# Patient Record
Sex: MALE | Race: WHITE | NOT HISPANIC OR LATINO | URBAN - METROPOLITAN AREA
[De-identification: names, ages, dates, MRNs, and addresses within clinical notes are randomized per-mention and may not be internally consistent; named-entity substitution may affect disease eponyms.]

---

## 2018-08-21 ENCOUNTER — ON CAMPUS - OUTPATIENT (AMBULATORY)
Dept: URBAN - METROPOLITAN AREA HOSPITAL 85 | Facility: HOSPITAL | Age: 58
End: 2018-08-21

## 2018-08-21 DIAGNOSIS — K52.9 NONINFECTIVE GASTROENTERITIS AND COLITIS, UNSPECIFIED: ICD-10-CM

## 2018-08-21 DIAGNOSIS — R73.9 HYPERGLYCEMIA, UNSPECIFIED: ICD-10-CM

## 2018-08-21 DIAGNOSIS — R10.9 UNSPECIFIED ABDOMINAL PAIN: ICD-10-CM

## 2018-08-21 PROCEDURE — 99244 OFF/OP CNSLTJ NEW/EST MOD 40: CPT | Performed by: NURSE PRACTITIONER

## 2018-08-22 ENCOUNTER — ON CAMPUS - OUTPATIENT (AMBULATORY)
Dept: URBAN - METROPOLITAN AREA HOSPITAL 85 | Facility: HOSPITAL | Age: 58
End: 2018-08-22

## 2018-08-22 DIAGNOSIS — K52.9 NONINFECTIVE GASTROENTERITIS AND COLITIS, UNSPECIFIED: ICD-10-CM

## 2018-08-22 DIAGNOSIS — R73.9 HYPERGLYCEMIA, UNSPECIFIED: ICD-10-CM

## 2018-08-22 DIAGNOSIS — R10.9 UNSPECIFIED ABDOMINAL PAIN: ICD-10-CM

## 2018-08-22 PROCEDURE — 99213 OFFICE O/P EST LOW 20 MIN: CPT | Performed by: NURSE PRACTITIONER

## 2018-09-14 ENCOUNTER — OFFICE (AMBULATORY)
Dept: URBAN - METROPOLITAN AREA CLINIC 64 | Facility: CLINIC | Age: 58
End: 2018-09-14

## 2018-09-14 VITALS
SYSTOLIC BLOOD PRESSURE: 124 MMHG | WEIGHT: 226 LBS | HEIGHT: 71 IN | HEART RATE: 67 BPM | DIASTOLIC BLOOD PRESSURE: 75 MMHG

## 2018-09-14 DIAGNOSIS — K50.00 CROHN'S DISEASE OF SMALL INTESTINE WITHOUT COMPLICATIONS: ICD-10-CM

## 2018-09-14 PROCEDURE — 99213 OFFICE O/P EST LOW 20 MIN: CPT | Performed by: NURSE PRACTITIONER

## 2018-10-26 ENCOUNTER — ON CAMPUS - OUTPATIENT (AMBULATORY)
Dept: URBAN - METROPOLITAN AREA HOSPITAL 2 | Facility: HOSPITAL | Age: 58
End: 2018-10-26
Payer: COMMERCIAL

## 2018-10-26 ENCOUNTER — OFFICE (AMBULATORY)
Dept: URBAN - METROPOLITAN AREA PATHOLOGY 4 | Facility: PATHOLOGY | Age: 58
End: 2018-10-26

## 2018-10-26 VITALS
WEIGHT: 228 LBS | SYSTOLIC BLOOD PRESSURE: 106 MMHG | DIASTOLIC BLOOD PRESSURE: 79 MMHG | HEART RATE: 64 BPM | RESPIRATION RATE: 15 BRPM | TEMPERATURE: 97.4 F | OXYGEN SATURATION: 98 % | OXYGEN SATURATION: 96 % | DIASTOLIC BLOOD PRESSURE: 77 MMHG | SYSTOLIC BLOOD PRESSURE: 119 MMHG | HEART RATE: 72 BPM | DIASTOLIC BLOOD PRESSURE: 73 MMHG | HEART RATE: 66 BPM | DIASTOLIC BLOOD PRESSURE: 74 MMHG | HEART RATE: 76 BPM | HEIGHT: 71 IN | DIASTOLIC BLOOD PRESSURE: 81 MMHG | SYSTOLIC BLOOD PRESSURE: 132 MMHG | SYSTOLIC BLOOD PRESSURE: 134 MMHG | SYSTOLIC BLOOD PRESSURE: 130 MMHG | OXYGEN SATURATION: 97 % | DIASTOLIC BLOOD PRESSURE: 83 MMHG | DIASTOLIC BLOOD PRESSURE: 85 MMHG | SYSTOLIC BLOOD PRESSURE: 120 MMHG | SYSTOLIC BLOOD PRESSURE: 118 MMHG | SYSTOLIC BLOOD PRESSURE: 152 MMHG | HEART RATE: 80 BPM | HEART RATE: 67 BPM | HEART RATE: 68 BPM | RESPIRATION RATE: 18 BRPM | OXYGEN SATURATION: 100 % | RESPIRATION RATE: 16 BRPM | SYSTOLIC BLOOD PRESSURE: 112 MMHG | DIASTOLIC BLOOD PRESSURE: 80 MMHG | DIASTOLIC BLOOD PRESSURE: 88 MMHG | DIASTOLIC BLOOD PRESSURE: 67 MMHG

## 2018-10-26 DIAGNOSIS — K64.0 FIRST DEGREE HEMORRHOIDS: ICD-10-CM

## 2018-10-26 DIAGNOSIS — K50.00 CROHN'S DISEASE OF SMALL INTESTINE WITHOUT COMPLICATIONS: ICD-10-CM

## 2018-10-26 DIAGNOSIS — D12.2 BENIGN NEOPLASM OF ASCENDING COLON: ICD-10-CM

## 2018-10-26 DIAGNOSIS — D12.4 BENIGN NEOPLASM OF DESCENDING COLON: ICD-10-CM

## 2018-10-26 DIAGNOSIS — D12.5 BENIGN NEOPLASM OF SIGMOID COLON: ICD-10-CM

## 2018-10-26 DIAGNOSIS — R10.31 RIGHT LOWER QUADRANT PAIN: ICD-10-CM

## 2018-10-26 DIAGNOSIS — K57.30 DIVERTICULOSIS OF LARGE INTESTINE WITHOUT PERFORATION OR ABS: ICD-10-CM

## 2018-10-26 LAB
GI HISTOLOGY: A. UNSPECIFIED: (no result)
GI HISTOLOGY: B. UNSPECIFIED: (no result)
GI HISTOLOGY: C. SELECT: (no result)
GI HISTOLOGY: D. UNSPECIFIED: (no result)
GI HISTOLOGY: E. UNSPECIFIED: (no result)
GI HISTOLOGY: PDF REPORT: (no result)

## 2018-10-26 PROCEDURE — 45380 COLONOSCOPY AND BIOPSY: CPT | Mod: 59 | Performed by: INTERNAL MEDICINE

## 2018-10-26 PROCEDURE — 45385 COLONOSCOPY W/LESION REMOVAL: CPT | Performed by: INTERNAL MEDICINE

## 2018-10-26 PROCEDURE — 88305 TISSUE EXAM BY PATHOLOGIST: CPT | Performed by: INTERNAL MEDICINE

## 2019-09-17 ENCOUNTER — HOSPITAL ENCOUNTER (EMERGENCY)
Facility: HOSPITAL | Age: 59
Discharge: HOME | End: 2019-09-17
Admitting: EMERGENCY MEDICINE

## 2019-09-17 ENCOUNTER — HOSPITAL ENCOUNTER (EMERGENCY)
Dept: CARDIOLOGY | Facility: HOSPITAL | Age: 59
Discharge: HOME | End: 2019-09-17

## 2019-09-17 VITALS
HEIGHT: 71 IN | SYSTOLIC BLOOD PRESSURE: 147 MMHG | HEART RATE: 75 BPM | TEMPERATURE: 99 F | DIASTOLIC BLOOD PRESSURE: 80 MMHG | BODY MASS INDEX: 32.76 KG/M2 | OXYGEN SATURATION: 95 % | WEIGHT: 234 LBS | RESPIRATION RATE: 12 BRPM

## 2019-09-17 DIAGNOSIS — M79.661 RIGHT CALF PAIN: ICD-10-CM

## 2019-09-17 DIAGNOSIS — I82.4Z1 LOWER LEG DVT (DEEP VENOUS THROMBOEMBOLISM), ACUTE, RIGHT (HCC): Primary | ICD-10-CM

## 2019-09-17 LAB
ANION GAP SERPL CALCULATED.3IONS-SCNC: 13.9 MMOL/L (ref 5–15)
APTT PPP: 25.2 SECONDS (ref 24–31)
BASOPHILS # BLD AUTO: 0 10*3/MM3 (ref 0–0.2)
BASOPHILS NFR BLD AUTO: 0.4 % (ref 0–1.5)
BH CV LOW VAS RIGHT GASTRONEMIUS VESSEL: 1
BH CV LOWER VASCULAR LEFT COMMON FEMORAL AUGMENT: NORMAL
BH CV LOWER VASCULAR LEFT COMMON FEMORAL COMPETENT: NORMAL
BH CV LOWER VASCULAR LEFT COMMON FEMORAL COMPRESS: NORMAL
BH CV LOWER VASCULAR LEFT COMMON FEMORAL PHASIC: NORMAL
BH CV LOWER VASCULAR LEFT COMMON FEMORAL SPONT: NORMAL
BH CV LOWER VASCULAR RIGHT COMMON FEMORAL AUGMENT: NORMAL
BH CV LOWER VASCULAR RIGHT COMMON FEMORAL COMPETENT: NORMAL
BH CV LOWER VASCULAR RIGHT COMMON FEMORAL COMPRESS: NORMAL
BH CV LOWER VASCULAR RIGHT COMMON FEMORAL PHASIC: NORMAL
BH CV LOWER VASCULAR RIGHT COMMON FEMORAL SPONT: NORMAL
BH CV LOWER VASCULAR RIGHT DISTAL FEMORAL COMPRESS: NORMAL
BH CV LOWER VASCULAR RIGHT GASTRONEMIUS COMPRESS: NORMAL
BH CV LOWER VASCULAR RIGHT GASTRONEMIUS THROMBUS: NORMAL
BH CV LOWER VASCULAR RIGHT GREATER SAPH AK COMPRESS: NORMAL
BH CV LOWER VASCULAR RIGHT GREATER SAPH BK COMPRESS: NORMAL
BH CV LOWER VASCULAR RIGHT LESSER SAPH COMPRESS: NORMAL
BH CV LOWER VASCULAR RIGHT MID FEMORAL AUGMENT: NORMAL
BH CV LOWER VASCULAR RIGHT MID FEMORAL COMPETENT: NORMAL
BH CV LOWER VASCULAR RIGHT MID FEMORAL COMPRESS: NORMAL
BH CV LOWER VASCULAR RIGHT MID FEMORAL PHASIC: NORMAL
BH CV LOWER VASCULAR RIGHT MID FEMORAL SPONT: NORMAL
BH CV LOWER VASCULAR RIGHT PERONEAL COMPRESS: NORMAL
BH CV LOWER VASCULAR RIGHT POPLITEAL AUGMENT: NORMAL
BH CV LOWER VASCULAR RIGHT POPLITEAL COMPETENT: NORMAL
BH CV LOWER VASCULAR RIGHT POPLITEAL COMPRESS: NORMAL
BH CV LOWER VASCULAR RIGHT POPLITEAL PHASIC: NORMAL
BH CV LOWER VASCULAR RIGHT POPLITEAL SPONT: NORMAL
BH CV LOWER VASCULAR RIGHT POSTERIOR TIBIAL COMPRESS: NORMAL
BH CV LOWER VASCULAR RIGHT PROXIMAL FEMORAL COMPRESS: NORMAL
BH CV LOWER VASCULAR RIGHT SAPHENOFEMORAL JUNCTION AUGMENT: NORMAL
BH CV LOWER VASCULAR RIGHT SAPHENOFEMORAL JUNCTION COMPETENT: NORMAL
BH CV LOWER VASCULAR RIGHT SAPHENOFEMORAL JUNCTION COMPRESS: NORMAL
BH CV LOWER VASCULAR RIGHT SAPHENOFEMORAL JUNCTION PHASIC: NORMAL
BH CV LOWER VASCULAR RIGHT SAPHENOFEMORAL JUNCTION SPONT: NORMAL
BH CV LOWER VASCULAR RIGHT VARICOSITY BK COMPRESS: NORMAL
BUN BLD-MCNC: 13 MG/DL (ref 8–20)
BUN/CREAT SERPL: 13 (ref 6.2–20.3)
CALCIUM SPEC-SCNC: 8.9 MG/DL (ref 8.9–10.3)
CHLORIDE SERPL-SCNC: 101 MMOL/L (ref 101–111)
CO2 SERPL-SCNC: 30 MMOL/L (ref 22–32)
CREAT BLD-MCNC: 1 MG/DL (ref 0.7–1.2)
DEPRECATED RDW RBC AUTO: 45.1 FL (ref 37–54)
EOSINOPHIL # BLD AUTO: 0.4 10*3/MM3 (ref 0–0.4)
EOSINOPHIL NFR BLD AUTO: 6.1 % (ref 0.3–6.2)
ERYTHROCYTE [DISTWIDTH] IN BLOOD BY AUTOMATED COUNT: 14.6 % (ref 12.3–15.4)
GFR SERPL CREATININE-BSD FRML MDRD: 76 ML/MIN/1.73
GLUCOSE BLD-MCNC: 96 MG/DL (ref 65–99)
HCT VFR BLD AUTO: 38.7 % (ref 37.5–51)
HGB BLD-MCNC: 12.9 G/DL (ref 13–17.7)
INR PPP: 0.95 (ref 0.9–1.1)
LYMPHOCYTES # BLD AUTO: 1.1 10*3/MM3 (ref 0.7–3.1)
LYMPHOCYTES NFR BLD AUTO: 15.2 % (ref 19.6–45.3)
MCH RBC QN AUTO: 29.4 PG (ref 26.6–33)
MCHC RBC AUTO-ENTMCNC: 33.2 G/DL (ref 31.5–35.7)
MCV RBC AUTO: 88.6 FL (ref 79–97)
MONOCYTES # BLD AUTO: 0.5 10*3/MM3 (ref 0.1–0.9)
MONOCYTES NFR BLD AUTO: 7.6 % (ref 5–12)
NEUTROPHILS # BLD AUTO: 5 10*3/MM3 (ref 1.7–7)
NEUTROPHILS NFR BLD AUTO: 70.7 % (ref 42.7–76)
NRBC BLD AUTO-RTO: 0.1 /100 WBC (ref 0–0.2)
PLATELET # BLD AUTO: 170 10*3/MM3 (ref 140–450)
PMV BLD AUTO: 8.2 FL (ref 6–12)
POTASSIUM BLD-SCNC: 3.9 MMOL/L (ref 3.6–5.1)
PROTHROMBIN TIME: 9.9 SECONDS (ref 9.6–11.7)
RBC # BLD AUTO: 4.37 10*6/MM3 (ref 4.14–5.8)
SODIUM BLD-SCNC: 141 MMOL/L (ref 136–144)
WBC NRBC COR # BLD: 7 10*3/MM3 (ref 3.4–10.8)

## 2019-09-17 PROCEDURE — 85610 PROTHROMBIN TIME: CPT | Performed by: NURSE PRACTITIONER

## 2019-09-17 PROCEDURE — 85025 COMPLETE CBC W/AUTO DIFF WBC: CPT | Performed by: NURSE PRACTITIONER

## 2019-09-17 PROCEDURE — 85730 THROMBOPLASTIN TIME PARTIAL: CPT | Performed by: NURSE PRACTITIONER

## 2019-09-17 PROCEDURE — 93971 EXTREMITY STUDY: CPT

## 2019-09-17 PROCEDURE — 80048 BASIC METABOLIC PNL TOTAL CA: CPT | Performed by: NURSE PRACTITIONER

## 2019-09-17 PROCEDURE — 99283 EMERGENCY DEPT VISIT LOW MDM: CPT

## 2019-09-17 RX ORDER — FLUTICASONE PROPIONATE 50 MCG
1 SPRAY, SUSPENSION (ML) NASAL AS NEEDED
COMMUNITY
Start: 2011-12-09

## 2019-09-17 RX ORDER — ALLOPURINOL 300 MG/1
300 TABLET ORAL DAILY
COMMUNITY
Start: 2011-12-09

## 2019-09-17 RX ORDER — LISINOPRIL 40 MG/1
40 TABLET ORAL DAILY
COMMUNITY
Start: 2011-12-09

## 2019-09-17 NOTE — DISCHARGE INSTRUCTIONS
Take medication as prescribed.  Rest your leg.  Follow-up with your primary care physician.  Return for new or worsening symptoms.

## 2019-09-17 NOTE — ED PROVIDER NOTES
Subjective   Patient is a 59-year-old white male with history of prior DVT in the right leg years ago presents today with complaints of pain in his right calf.  States pain started yesterday immediately after he stepped backwards while playing softball and felt a pop.  Reports his pain is worse with movement dorsiflexion and plantarflexion of the foot.  He denies any numbness tingling or weakness distal to the injury.  He denies any other injury or complaint.            Review of Systems   Musculoskeletal:        Right calf pain   Neurological: Negative for weakness and numbness.       Past Medical History:   Diagnosis Date   • Melanoma (CMS/HCC)    • Pyloric stenosis        Allergies   Allergen Reactions   • Sulfa Antibiotics Rash   • Suture Rash     Cat gut suture       Past Surgical History:   Procedure Laterality Date   • APPENDECTOMY         History reviewed. No pertinent family history.    Social History     Socioeconomic History   • Marital status:      Spouse name: Not on file   • Number of children: Not on file   • Years of education: Not on file   • Highest education level: Not on file   Tobacco Use   • Smoking status: Never Smoker   Substance and Sexual Activity   • Alcohol use: No     Frequency: Never   • Drug use: No           Objective   Physical Exam   Constitutional: He appears well-developed.   Vital signs and triage nurse note reviewed.  Constitutional: Awake, alert; well-developed and well-nourished. No acute distress is noted.  Cardiovascular: Regular rate and rhythm  Pulmonary: Respiratory effort regular nonlabored  Musculoskeletal: Independent range of motion of all extremities.  There is tenderness over the right calf diffusely.  There is no underlying erythema ecchymosis or edema.  There is pain elicited with dorsiflexion and plantar flexion of the right foot.  Distal neurovascular motor intact.  Neuro: Alert oriented x3, speech is clear and appropriate, GCS 15.    Skin: Flesh tone,  warm, dry, intact; no erythematous or petechial rash or lesion.        Procedures           ED Course      Labs Reviewed   BASIC METABOLIC PANEL   APTT   PROTIME-INR   CBC WITH AUTO DIFFERENTIAL   CBC AND DIFFERENTIAL    Narrative:     The following orders were created for panel order CBC & Differential.  Procedure                               Abnormality         Status                     ---------                               -----------         ------                     CBC Auto Differential[[id number]]                                                         Please view results for these tests on the individual orders.     No radiology results for the last day  Medications - No data to display              MDM  Number of Diagnoses or Management Options  Lower leg DVT (deep venous thromboembolism), acute, right (CMS/HCC):   Right calf pain:      Amount and/or Complexity of Data Reviewed  Clinical lab tests: ordered    Risk of Complications, Morbidity, and/or Mortality  General comments: Comorbidities: DVT  Differentials: Muscle strain or tear, DVT or SVT;this list is not all inclusive and does not constitute the entirety of considered causes    Patient had venous Doppler obtained of the right leg that revealed acute DVT in the gastroc vein per vascular tech preliminary results.    Patient had basic labs obtained.    Diagnosis and treatment plan discussed with patient.  Patient agreeable to plan.   I discussed findings with patient who voices understanding of discharge instructions, signs and symptoms requiring return to ED; discharged improved and in stable condition with follow up for re-evaluation.  Prescription for Eliquis starter pack.      Patient Progress  Patient progress: stable      Final diagnoses:   Lower leg DVT (deep venous thromboembolism), acute, right (CMS/HCC)   Right calf pain              Vee Bae, APRN  09/17/19 1203

## 2019-09-17 NOTE — ED NOTES
"Pt reports took a wrong step yesterday and hear a \"pop\" on his R leg. Pt c/o R knee/calf pain 3/10      Madhavi Cordoba RN  09/17/19 0527    "

## 2019-10-15 ENCOUNTER — APPOINTMENT (OUTPATIENT)
Dept: CT IMAGING | Facility: HOSPITAL | Age: 59
End: 2019-10-15

## 2019-10-15 ENCOUNTER — HOSPITAL ENCOUNTER (EMERGENCY)
Facility: HOSPITAL | Age: 59
Discharge: HOME | End: 2019-10-15
Admitting: EMERGENCY MEDICINE

## 2019-10-15 VITALS
DIASTOLIC BLOOD PRESSURE: 83 MMHG | OXYGEN SATURATION: 97 % | HEART RATE: 79 BPM | SYSTOLIC BLOOD PRESSURE: 140 MMHG | BODY MASS INDEX: 32.81 KG/M2 | WEIGHT: 234.35 LBS | TEMPERATURE: 98.9 F | RESPIRATION RATE: 19 BRPM | HEIGHT: 71 IN

## 2019-10-15 DIAGNOSIS — R07.81 PLEURITIC CHEST PAIN: Primary | ICD-10-CM

## 2019-10-15 DIAGNOSIS — J20.9 ACUTE BRONCHITIS, UNSPECIFIED ORGANISM: ICD-10-CM

## 2019-10-15 LAB
ALBUMIN SERPL-MCNC: 4.5 G/DL (ref 3.5–5.2)
ALBUMIN/GLOB SERPL: 1.7 G/DL
ALP SERPL-CCNC: 89 U/L (ref 39–117)
ALT SERPL W P-5'-P-CCNC: 19 U/L (ref 1–41)
ANION GAP SERPL CALCULATED.3IONS-SCNC: 12 MMOL/L (ref 5–15)
AST SERPL-CCNC: 24 U/L (ref 1–40)
BASOPHILS # BLD AUTO: 0 10*3/MM3 (ref 0–0.2)
BASOPHILS NFR BLD AUTO: 0.5 % (ref 0–1.5)
BILIRUB SERPL-MCNC: 0.6 MG/DL (ref 0.2–1.2)
BUN BLD-MCNC: 12 MG/DL (ref 8–23)
BUN/CREAT SERPL: 14.1 (ref 7–25)
CALCIUM SPEC-SCNC: 9.2 MG/DL (ref 8.6–10.5)
CHLORIDE SERPL-SCNC: 102 MMOL/L (ref 98–107)
CO2 SERPL-SCNC: 29 MMOL/L (ref 22–29)
CREAT BLD-MCNC: 0.85 MG/DL (ref 0.76–1.27)
DEPRECATED RDW RBC AUTO: 43.8 FL (ref 37–54)
EOSINOPHIL # BLD AUTO: 0.5 10*3/MM3 (ref 0–0.4)
EOSINOPHIL NFR BLD AUTO: 7.5 % (ref 0.3–6.2)
ERYTHROCYTE [DISTWIDTH] IN BLOOD BY AUTOMATED COUNT: 14.1 % (ref 12.3–15.4)
FLUAV AG NPH QL: NEGATIVE
FLUBV AG NPH QL IA: NEGATIVE
GFR SERPL CREATININE-BSD FRML MDRD: 92 ML/MIN/1.73
GLOBULIN UR ELPH-MCNC: 2.6 GM/DL
GLUCOSE BLD-MCNC: 96 MG/DL (ref 65–99)
HCT VFR BLD AUTO: 39.5 % (ref 37.5–51)
HGB BLD-MCNC: 13.3 G/DL (ref 13–17.7)
LYMPHOCYTES # BLD AUTO: 0.8 10*3/MM3 (ref 0.7–3.1)
LYMPHOCYTES NFR BLD AUTO: 12.2 % (ref 19.6–45.3)
MCH RBC QN AUTO: 29.5 PG (ref 26.6–33)
MCHC RBC AUTO-ENTMCNC: 33.7 G/DL (ref 31.5–35.7)
MCV RBC AUTO: 87.5 FL (ref 79–97)
MONOCYTES # BLD AUTO: 0.5 10*3/MM3 (ref 0.1–0.9)
MONOCYTES NFR BLD AUTO: 7.4 % (ref 5–12)
NEUTROPHILS # BLD AUTO: 4.5 10*3/MM3 (ref 1.7–7)
NEUTROPHILS NFR BLD AUTO: 72.4 % (ref 42.7–76)
NRBC BLD AUTO-RTO: 0.1 /100 WBC (ref 0–0.2)
PLATELET # BLD AUTO: 178 10*3/MM3 (ref 140–450)
PMV BLD AUTO: 8.3 FL (ref 6–12)
POTASSIUM BLD-SCNC: 4 MMOL/L (ref 3.5–5.2)
PROT SERPL-MCNC: 7.1 G/DL (ref 6–8.5)
RBC # BLD AUTO: 4.52 10*6/MM3 (ref 4.14–5.8)
SODIUM BLD-SCNC: 139 MMOL/L (ref 136–145)
TROPONIN T SERPL-MCNC: <0.01 NG/ML (ref 0–0.03)
WBC NRBC COR # BLD: 6.2 10*3/MM3 (ref 3.4–10.8)

## 2019-10-15 PROCEDURE — 0 IOPAMIDOL PER 1 ML: Performed by: NURSE PRACTITIONER

## 2019-10-15 PROCEDURE — 85025 COMPLETE CBC W/AUTO DIFF WBC: CPT | Performed by: NURSE PRACTITIONER

## 2019-10-15 PROCEDURE — 87804 INFLUENZA ASSAY W/OPTIC: CPT | Performed by: NURSE PRACTITIONER

## 2019-10-15 PROCEDURE — 99283 EMERGENCY DEPT VISIT LOW MDM: CPT

## 2019-10-15 PROCEDURE — 93005 ELECTROCARDIOGRAM TRACING: CPT

## 2019-10-15 PROCEDURE — 71275 CT ANGIOGRAPHY CHEST: CPT

## 2019-10-15 PROCEDURE — 84484 ASSAY OF TROPONIN QUANT: CPT | Performed by: NURSE PRACTITIONER

## 2019-10-15 PROCEDURE — 80053 COMPREHEN METABOLIC PANEL: CPT | Performed by: NURSE PRACTITIONER

## 2019-10-15 RX ORDER — SODIUM CHLORIDE 0.9 % (FLUSH) 0.9 %
10 SYRINGE (ML) INJECTION AS NEEDED
Status: DISCONTINUED | OUTPATIENT
Start: 2019-10-15 | End: 2019-10-15 | Stop reason: HOSPADM

## 2019-10-15 RX ORDER — BENZONATATE 200 MG/1
200 CAPSULE ORAL 3 TIMES DAILY PRN
Qty: 30 CAPSULE | Refills: 0 | Status: SHIPPED | OUTPATIENT
Start: 2019-10-15 | End: 2021-01-02

## 2019-10-15 RX ORDER — METHYLPREDNISOLONE 4 MG/1
TABLET ORAL
Qty: 21 TABLET | Refills: 0 | Status: SHIPPED | OUTPATIENT
Start: 2019-10-15 | End: 2021-01-02

## 2019-10-15 RX ORDER — ALBUTEROL SULFATE 90 UG/1
2 AEROSOL, METERED RESPIRATORY (INHALATION) EVERY 4 HOURS PRN
Qty: 1 INHALER | Refills: 0 | Status: SHIPPED | OUTPATIENT
Start: 2019-10-15 | End: 2021-01-02

## 2019-10-15 RX ADMIN — IOPAMIDOL 75 ML: 755 INJECTION, SOLUTION INTRAVENOUS at 11:53

## 2019-10-15 NOTE — DISCHARGE INSTRUCTIONS
Take medications as prescribed.  May continue current home medications.  Drink plenty of fluids.  May also use Mucinex for congestion.  Follow-up with your primary care physician.  Return for new or worsening symptoms.

## 2019-10-15 NOTE — ED NOTES
Pt reports that he has had a sharp stabbing pain in the right side of his chest comes and goes, hx of dvt. Pt is on blood thinners.      Arminda Suárez RN  10/15/19 1024

## 2019-10-15 NOTE — ED PROVIDER NOTES
Subjective   Patient is a 59-year-old white male with history of DVT of the right leg diagnosed 1 month ago currently on Eliquis presents today with complaints of sharp stabbing intermittent right sided chest pain.  He states he woke up this morning and noticed a sharp fleeting pain in the right side of his chest just beneath his breast.  States he has noticed it when he moves or takes a deep breath.  He denies any shortness of breath.  States he has been ill recently with some nasal congestion and nonproductive cough.  He denies any other chest pain.  He denies any shortness of breath.  Denies any fever chills.  He denies any ill contacts or recent travel.            Review of Systems   Constitutional: Negative for chills and fever.   HENT: Positive for congestion, postnasal drip and sinus pressure. Negative for sore throat.    Respiratory: Positive for cough. Negative for chest tightness and shortness of breath.    Cardiovascular: Positive for chest pain. Negative for leg swelling.   Gastrointestinal: Negative for nausea and vomiting.       Past Medical History:   Diagnosis Date   • Melanoma (CMS/HCC)    • Pyloric stenosis        Allergies   Allergen Reactions   • Sulfa Antibiotics Rash   • Suture Rash     Cat gut suture       Past Surgical History:   Procedure Laterality Date   • APPENDECTOMY         History reviewed. No pertinent family history.    Social History     Socioeconomic History   • Marital status:      Spouse name: Not on file   • Number of children: Not on file   • Years of education: Not on file   • Highest education level: Not on file   Tobacco Use   • Smoking status: Never Smoker   Substance and Sexual Activity   • Alcohol use: No     Frequency: Never   • Drug use: No           Objective   Physical Exam   Constitutional: He appears well-developed.   Vital signs and triage nurse note reviewed.  Constitutional: Awake, alert; well-developed and well-nourished. No acute distress is  noted.  HEENT: Normocephalic, atraumatic; pupils are PERRL with intact EOM; oropharynx is pink and moist without exudate or erythema.  No drooling or pooling of oral secretions.  Neck: Supple, full range of motion without pain; no cervical lymphadenopathy. Normal phonation.  Cardiovascular: Regular rate and rhythm, normal S1-S2.  No murmur noted.  Pulmonary: Respiratory effort regular nonlabored, breath sounds intermittent wheezes noted on the right that clears with coughing.  Abdomen: Soft, nontender, nondistended with normoactive bowel sounds; no rebound or guarding.  Musculoskeletal: Independent range of motion of all extremities with no palpable tenderness or edema.  Calves are symmetric and nontender.  Neuro: Alert oriented x3, speech is clear and appropriate, GCS 15.    Skin: Flesh tone, warm, dry, intact; no erythematous or petechial rash or lesion.        Procedures           ED Course  ED Course as of Oct 15 1239   Tue Oct 15, 2019   1030 EKG reviewed by me and interpreted by Dr. García: Sinus rhythm with ventricular rate of 78.  No acute changes noted.  [MD]      ED Course User Index  [MD] Vee Bae APRN      Labs Reviewed   CBC WITH AUTO DIFFERENTIAL - Abnormal; Notable for the following components:       Result Value    Lymphocyte % 12.2 (*)     Eosinophil % 7.5 (*)     Eosinophils, Absolute 0.50 (*)     All other components within normal limits   INFLUENZA ANTIGEN, RAPID - Normal   TROPONIN (IN-HOUSE) - Normal    Narrative:     Troponin T Reference Range:  <= 0.03 ng/mL-   Negative for AMI  >0.03 ng/mL-     Abnormal for myocardial necrosis.  Clinicians would have to utilize clinical acumen, EKG, Troponin and serial changes to determine if it is an Acute Myocardial Infarction or myocardial injury due to an underlying chronic condition.    COMPREHENSIVE METABOLIC PANEL    Narrative:     GFR Normal >60  Chronic Kidney Disease <60  Kidney Failure <15   CBC AND DIFFERENTIAL    Narrative:     The  following orders were created for panel order CBC & Differential.  Procedure                               Abnormality         Status                     ---------                               -----------         ------                     CBC Auto Differential[[id number]]        Abnormal            Final result                 Please view results for these tests on the individual orders.     Ct Chest Pulmonary Embolism    Result Date: 10/15/2019   1. No pulmonary embolism. 2. Central bronchial thickening in both lungs. Correlate for bronchitis. 3. Diffuse thickening of the thoracic esophagus. Correlate for esophagitis symptoms. 4. Small esophageal hiatal hernia. 5. Uncomplicated colonic diverticulosis. 6. Borderline 3 cm fusiform descending thoracic aortic aneurysm.    Electronically Signed By-Dr. Sudha Hess MD On:10/15/2019 12:12 PM This report was finalized on [id number] by Dr. Sudha Hess MD.    Medications   sodium chloride 0.9 % flush 10 mL (not administered)   iopamidol (ISOVUE-370) 76 % injection 75 mL (75 mL Intravenous Given 10/15/19 1153)                 MDM  Number of Diagnoses or Management Options  Acute bronchitis, unspecified organism:   Pleuritic chest pain:      Amount and/or Complexity of Data Reviewed  Clinical lab tests: ordered and reviewed  Tests in the radiology section of CPT®: ordered and reviewed    Risk of Complications, Morbidity, and/or Mortality  General comments: Comorbidities: DVT  Differentials: PE, pneumonia, pneumothorax, bronchitis, muscle strain, pleurisy;this list is not all inclusive and does not constitute the entirety of considered causes    Patient is placed on continuous cardiac monitor.  He had IV established.  He had labs, EKG and CT PE protocol obtained.    On reexamination, patient's resting comfortably no acute distress.  Denies any new complaints at this time.  He denies any shortness of breath.  Vital signs are stable.  He remains  well-appearing.    Diagnosis and treatment plan discussed with patient.  Patient agreeable to plan.   I discussed findings with patient who voices understanding of discharge instructions, signs and symptoms requiring return to ED; discharged improved and in stable condition with follow up for re-evaluation.  Prescriptions for Medrol dose pack, albuterol inhaler, Tessalon Perles.    Patient Progress  Patient progress: stable      Final diagnoses:   Pleuritic chest pain   Acute bronchitis, unspecified organism              Vee Bae, MONICA  10/15/19 1236       Vee Bae, MONICA  10/15/19 1239

## 2019-11-14 ENCOUNTER — OFFICE (AMBULATORY)
Dept: URBAN - METROPOLITAN AREA CLINIC 64 | Facility: CLINIC | Age: 59
End: 2019-11-14

## 2019-11-14 VITALS
HEIGHT: 71 IN | HEART RATE: 69 BPM | SYSTOLIC BLOOD PRESSURE: 133 MMHG | DIASTOLIC BLOOD PRESSURE: 89 MMHG | WEIGHT: 239 LBS

## 2019-11-14 DIAGNOSIS — R93.89 ABNORMAL FINDINGS ON DIAGNOSTIC IMAGING OF OTHER SPECIFIED B: ICD-10-CM

## 2019-11-14 DIAGNOSIS — I10 ESSENTIAL (PRIMARY) HYPERTENSION: ICD-10-CM

## 2019-11-14 DIAGNOSIS — R13.10 DYSPHAGIA, UNSPECIFIED: ICD-10-CM

## 2019-11-14 PROCEDURE — 99204 OFFICE O/P NEW MOD 45 MIN: CPT | Performed by: INTERNAL MEDICINE

## 2019-11-14 RX ORDER — OMEPRAZOLE 40 MG/1
40 CAPSULE, DELAYED RELEASE ORAL
Qty: 90 | Refills: 4 | Status: ACTIVE
Start: 2019-11-14

## 2019-12-20 ENCOUNTER — ON CAMPUS - OUTPATIENT (AMBULATORY)
Dept: URBAN - METROPOLITAN AREA HOSPITAL 2 | Facility: HOSPITAL | Age: 59
End: 2019-12-20

## 2019-12-20 ENCOUNTER — OFFICE (AMBULATORY)
Dept: URBAN - METROPOLITAN AREA PATHOLOGY 4 | Facility: PATHOLOGY | Age: 59
End: 2019-12-20

## 2019-12-20 VITALS
OXYGEN SATURATION: 97 % | DIASTOLIC BLOOD PRESSURE: 83 MMHG | HEART RATE: 83 BPM | HEART RATE: 77 BPM | HEIGHT: 71 IN | DIASTOLIC BLOOD PRESSURE: 104 MMHG | HEART RATE: 79 BPM | DIASTOLIC BLOOD PRESSURE: 73 MMHG | SYSTOLIC BLOOD PRESSURE: 150 MMHG | OXYGEN SATURATION: 98 % | HEART RATE: 80 BPM | OXYGEN SATURATION: 94 % | SYSTOLIC BLOOD PRESSURE: 145 MMHG | SYSTOLIC BLOOD PRESSURE: 140 MMHG | DIASTOLIC BLOOD PRESSURE: 93 MMHG | DIASTOLIC BLOOD PRESSURE: 100 MMHG | SYSTOLIC BLOOD PRESSURE: 139 MMHG | RESPIRATION RATE: 16 BRPM | TEMPERATURE: 98.6 F | OXYGEN SATURATION: 95 % | SYSTOLIC BLOOD PRESSURE: 119 MMHG | DIASTOLIC BLOOD PRESSURE: 82 MMHG | HEART RATE: 84 BPM | SYSTOLIC BLOOD PRESSURE: 135 MMHG | HEART RATE: 85 BPM | RESPIRATION RATE: 18 BRPM | WEIGHT: 241 LBS

## 2019-12-20 DIAGNOSIS — K31.89 OTHER DISEASES OF STOMACH AND DUODENUM: ICD-10-CM

## 2019-12-20 DIAGNOSIS — K57.10 DIVERTICULOSIS OF SMALL INTESTINE WITHOUT PERFORATION OR ABS: ICD-10-CM

## 2019-12-20 DIAGNOSIS — K22.2 ESOPHAGEAL OBSTRUCTION: ICD-10-CM

## 2019-12-20 DIAGNOSIS — R13.10 DYSPHAGIA, UNSPECIFIED: ICD-10-CM

## 2019-12-20 LAB
GI HISTOLOGY: A. SELECT: (no result)
GI HISTOLOGY: B. SELECT: (no result)
GI HISTOLOGY: PDF REPORT: (no result)

## 2019-12-20 PROCEDURE — 88305 TISSUE EXAM BY PATHOLOGIST: CPT | Performed by: INTERNAL MEDICINE

## 2019-12-20 PROCEDURE — 43450 DILATE ESOPHAGUS 1/MULT PASS: CPT | Performed by: INTERNAL MEDICINE

## 2019-12-20 PROCEDURE — 43239 EGD BIOPSY SINGLE/MULTIPLE: CPT | Performed by: INTERNAL MEDICINE

## 2020-01-23 ENCOUNTER — OFFICE (AMBULATORY)
Dept: URBAN - METROPOLITAN AREA CLINIC 64 | Facility: CLINIC | Age: 60
End: 2020-01-23

## 2020-01-23 VITALS
HEIGHT: 71 IN | WEIGHT: 245 LBS | DIASTOLIC BLOOD PRESSURE: 89 MMHG | HEART RATE: 76 BPM | SYSTOLIC BLOOD PRESSURE: 137 MMHG

## 2020-01-23 DIAGNOSIS — Z86.010 PERSONAL HISTORY OF COLONIC POLYPS: ICD-10-CM

## 2020-01-23 DIAGNOSIS — R13.10 DYSPHAGIA, UNSPECIFIED: ICD-10-CM

## 2020-01-23 PROCEDURE — 99213 OFFICE O/P EST LOW 20 MIN: CPT | Performed by: NURSE PRACTITIONER

## 2020-01-23 RX ORDER — OMEPRAZOLE 40 MG/1
40 CAPSULE, DELAYED RELEASE ORAL
Qty: 90 | Refills: 4 | Status: ACTIVE
Start: 2019-11-14

## 2020-11-05 ENCOUNTER — TRANSCRIBE ORDERS (OUTPATIENT)
Dept: ADMINISTRATIVE | Facility: HOSPITAL | Age: 60
End: 2020-11-05

## 2020-11-05 DIAGNOSIS — I71.20 THORACIC AORTIC ANEURYSM WITHOUT RUPTURE (HCC): Primary | ICD-10-CM

## 2020-11-11 ENCOUNTER — HOSPITAL ENCOUNTER (OUTPATIENT)
Dept: CT IMAGING | Facility: HOSPITAL | Age: 60
Discharge: HOME | End: 2020-11-11
Admitting: INTERNAL MEDICINE

## 2020-11-11 DIAGNOSIS — I71.20 THORACIC AORTIC ANEURYSM WITHOUT RUPTURE (HCC): ICD-10-CM

## 2020-11-11 LAB — CREAT BLDA-MCNC: 0.7 MG/DL (ref 0.6–1.3)

## 2020-11-11 PROCEDURE — 71260 CT THORAX DX C+: CPT

## 2020-11-11 PROCEDURE — 0 IOPAMIDOL PER 1 ML: Performed by: INTERNAL MEDICINE

## 2020-11-11 PROCEDURE — 82565 ASSAY OF CREATININE: CPT

## 2020-11-11 RX ADMIN — IOPAMIDOL 100 ML: 755 INJECTION, SOLUTION INTRAVENOUS at 11:30

## 2021-01-02 ENCOUNTER — HOSPITAL ENCOUNTER (OUTPATIENT)
Facility: HOSPITAL | Age: 61
Setting detail: OBSERVATION
Discharge: HOME | End: 2021-01-04
Attending: EMERGENCY MEDICINE | Admitting: INTERNAL MEDICINE

## 2021-01-02 ENCOUNTER — APPOINTMENT (OUTPATIENT)
Dept: CT IMAGING | Facility: HOSPITAL | Age: 61
End: 2021-01-02

## 2021-01-02 DIAGNOSIS — R07.9 CHEST PAIN, UNSPECIFIED TYPE: ICD-10-CM

## 2021-01-02 DIAGNOSIS — Z20.822 2019-NCOV NOT DETECTED: Primary | ICD-10-CM

## 2021-01-02 DIAGNOSIS — J18.9 PNEUMONIA DUE TO INFECTIOUS ORGANISM, UNSPECIFIED LATERALITY, UNSPECIFIED PART OF LUNG: ICD-10-CM

## 2021-01-02 DIAGNOSIS — I95.9 HYPOTENSION, UNSPECIFIED HYPOTENSION TYPE: ICD-10-CM

## 2021-01-02 DIAGNOSIS — R00.1 BRADYCARDIA: ICD-10-CM

## 2021-01-02 PROBLEM — K21.9 GERD WITH STRICTURE: Status: ACTIVE | Noted: 2019-11-08

## 2021-01-02 PROBLEM — G56.03 CARPAL TUNNEL SYNDROME, BILATERAL UPPER LIMBS: Status: ACTIVE | Noted: 2017-04-02

## 2021-01-02 PROBLEM — K22.89 ESOPHAGEAL THICKENING: Status: ACTIVE | Noted: 2019-11-08

## 2021-01-02 PROBLEM — K22.2 GERD WITH STRICTURE: Status: ACTIVE | Noted: 2019-11-08

## 2021-01-02 PROBLEM — I82.4Z1 ACUTE DEEP VEIN THROMBOSIS (DVT) OF DISTAL VEIN OF RIGHT LOWER EXTREMITY (HCC): Status: ACTIVE | Noted: 2019-11-08

## 2021-01-02 PROBLEM — M10.9 GOUT: Status: ACTIVE | Noted: 2021-01-02

## 2021-01-02 PROBLEM — I71.20 THORACIC AORTIC ANEURYSM (HCC): Status: ACTIVE | Noted: 2019-10-15

## 2021-01-02 LAB
ALBUMIN SERPL-MCNC: 4.5 G/DL (ref 3.5–5.2)
ALBUMIN/GLOB SERPL: 1.6 G/DL
ALP SERPL-CCNC: 94 U/L (ref 39–117)
ALT SERPL W P-5'-P-CCNC: 24 U/L (ref 1–41)
ANION GAP SERPL CALCULATED.3IONS-SCNC: 11 MMOL/L (ref 5–15)
APTT PPP: 24.4 SECONDS (ref 24–31)
AST SERPL-CCNC: 22 U/L (ref 1–40)
B PARAPERT DNA SPEC QL NAA+PROBE: NOT DETECTED
B PERT DNA SPEC QL NAA+PROBE: NOT DETECTED
BASOPHILS # BLD AUTO: 0.1 10*3/MM3 (ref 0–0.2)
BASOPHILS NFR BLD AUTO: 0.7 % (ref 0–1.5)
BILIRUB SERPL-MCNC: 0.5 MG/DL (ref 0–1.2)
BILIRUB UR QL STRIP: NEGATIVE
BUN SERPL-MCNC: 17 MG/DL (ref 8–23)
BUN/CREAT SERPL: 19.1 (ref 7–25)
C PNEUM DNA NPH QL NAA+NON-PROBE: NOT DETECTED
CALCIUM SPEC-SCNC: 9.8 MG/DL (ref 8.6–10.5)
CHLORIDE SERPL-SCNC: 99 MMOL/L (ref 98–107)
CLARITY UR: CLEAR
CO2 SERPL-SCNC: 30 MMOL/L (ref 22–29)
COLOR UR: YELLOW
CREAT SERPL-MCNC: 0.89 MG/DL (ref 0.76–1.27)
CRP SERPL-MCNC: 0.9 MG/DL (ref 0–0.5)
D DIMER PPP FEU-MCNC: 1.66 MG/L (FEU) (ref 0–0.59)
DEPRECATED RDW RBC AUTO: 44.2 FL (ref 37–54)
EOSINOPHIL # BLD AUTO: 0.3 10*3/MM3 (ref 0–0.4)
EOSINOPHIL NFR BLD AUTO: 4.4 % (ref 0.3–6.2)
ERYTHROCYTE [DISTWIDTH] IN BLOOD BY AUTOMATED COUNT: 14.6 % (ref 12.3–15.4)
FERRITIN SERPL-MCNC: 302 NG/ML (ref 30–400)
FLUAV SUBTYP SPEC NAA+PROBE: NOT DETECTED
FLUBV RNA ISLT QL NAA+PROBE: NOT DETECTED
GFR SERPL CREATININE-BSD FRML MDRD: 87 ML/MIN/1.73
GLOBULIN UR ELPH-MCNC: 2.8 GM/DL
GLUCOSE SERPL-MCNC: 126 MG/DL (ref 65–99)
GLUCOSE UR STRIP-MCNC: NEGATIVE MG/DL
HADV DNA SPEC NAA+PROBE: NOT DETECTED
HCOV 229E RNA SPEC QL NAA+PROBE: NOT DETECTED
HCOV HKU1 RNA SPEC QL NAA+PROBE: NOT DETECTED
HCOV NL63 RNA SPEC QL NAA+PROBE: NOT DETECTED
HCOV OC43 RNA SPEC QL NAA+PROBE: NOT DETECTED
HCT VFR BLD AUTO: 40.3 % (ref 37.5–51)
HGB BLD-MCNC: 13.6 G/DL (ref 13–17.7)
HGB UR QL STRIP.AUTO: NEGATIVE
HMPV RNA NPH QL NAA+NON-PROBE: NOT DETECTED
HOLD SPECIMEN: NORMAL
HOLD SPECIMEN: NORMAL
HPIV1 RNA SPEC QL NAA+PROBE: NOT DETECTED
HPIV2 RNA SPEC QL NAA+PROBE: NOT DETECTED
HPIV3 RNA NPH QL NAA+PROBE: NOT DETECTED
HPIV4 P GENE NPH QL NAA+PROBE: NOT DETECTED
INR PPP: 0.93 (ref 0.93–1.1)
KETONES UR QL STRIP: NEGATIVE
L PNEUMO1 AG UR QL IA: NEGATIVE
LDH SERPL-CCNC: 195 U/L (ref 135–225)
LEUKOCYTE ESTERASE UR QL STRIP.AUTO: NEGATIVE
LYMPHOCYTES # BLD AUTO: 1.2 10*3/MM3 (ref 0.7–3.1)
LYMPHOCYTES NFR BLD AUTO: 15.4 % (ref 19.6–45.3)
M PNEUMO IGG SER IA-ACNC: NOT DETECTED
MCH RBC QN AUTO: 28.7 PG (ref 26.6–33)
MCHC RBC AUTO-ENTMCNC: 33.6 G/DL (ref 31.5–35.7)
MCV RBC AUTO: 85.4 FL (ref 79–97)
MONOCYTES # BLD AUTO: 0.5 10*3/MM3 (ref 0.1–0.9)
MONOCYTES NFR BLD AUTO: 6 % (ref 5–12)
NEUTROPHILS NFR BLD AUTO: 5.7 10*3/MM3 (ref 1.7–7)
NEUTROPHILS NFR BLD AUTO: 73.5 % (ref 42.7–76)
NITRITE UR QL STRIP: NEGATIVE
NRBC BLD AUTO-RTO: 0.1 /100 WBC (ref 0–0.2)
NT-PROBNP SERPL-MCNC: 33.5 PG/ML (ref 0–900)
PH UR STRIP.AUTO: 7 [PH] (ref 5–8)
PLATELET # BLD AUTO: 196 10*3/MM3 (ref 140–450)
PMV BLD AUTO: 8 FL (ref 6–12)
POTASSIUM SERPL-SCNC: 3.7 MMOL/L (ref 3.5–5.2)
PROCALCITONIN SERPL-MCNC: 0.05 NG/ML (ref 0–0.25)
PROT SERPL-MCNC: 7.3 G/DL (ref 6–8.5)
PROT UR QL STRIP: NEGATIVE
PROTHROMBIN TIME: 10.3 SECONDS (ref 9.6–11.7)
QT INTERVAL: 386 MS
RBC # BLD AUTO: 4.72 10*6/MM3 (ref 4.14–5.8)
RHINOVIRUS RNA SPEC NAA+PROBE: NOT DETECTED
RSV RNA NPH QL NAA+NON-PROBE: NOT DETECTED
S PNEUM AG SPEC QL LA: NEGATIVE
SARS-COV-2 RNA NPH QL NAA+NON-PROBE: NOT DETECTED
SARS-COV-2 RNA PNL SPEC NAA+PROBE: NORMAL
SODIUM SERPL-SCNC: 140 MMOL/L (ref 136–145)
SP GR UR STRIP: 1.03 (ref 1–1.03)
TROPONIN T SERPL-MCNC: <0.01 NG/ML (ref 0–0.03)
UROBILINOGEN UR QL STRIP: NORMAL
WBC # BLD AUTO: 7.7 10*3/MM3 (ref 3.4–10.8)
WHOLE BLOOD HOLD SPECIMEN: NORMAL
WHOLE BLOOD HOLD SPECIMEN: NORMAL

## 2021-01-02 PROCEDURE — 83615 LACTATE (LD) (LDH) ENZYME: CPT | Performed by: NURSE PRACTITIONER

## 2021-01-02 PROCEDURE — G0378 HOSPITAL OBSERVATION PER HR: HCPCS

## 2021-01-02 PROCEDURE — 85730 THROMBOPLASTIN TIME PARTIAL: CPT | Performed by: NURSE PRACTITIONER

## 2021-01-02 PROCEDURE — 81003 URINALYSIS AUTO W/O SCOPE: CPT | Performed by: NURSE PRACTITIONER

## 2021-01-02 PROCEDURE — 96375 TX/PRO/DX INJ NEW DRUG ADDON: CPT

## 2021-01-02 PROCEDURE — 84484 ASSAY OF TROPONIN QUANT: CPT | Performed by: INTERNAL MEDICINE

## 2021-01-02 PROCEDURE — 84145 PROCALCITONIN (PCT): CPT | Performed by: NURSE PRACTITIONER

## 2021-01-02 PROCEDURE — 80053 COMPREHEN METABOLIC PANEL: CPT | Performed by: NURSE PRACTITIONER

## 2021-01-02 PROCEDURE — 83880 ASSAY OF NATRIURETIC PEPTIDE: CPT | Performed by: NURSE PRACTITIONER

## 2021-01-02 PROCEDURE — 87899 AGENT NOS ASSAY W/OPTIC: CPT | Performed by: NURSE PRACTITIONER

## 2021-01-02 PROCEDURE — 87040 BLOOD CULTURE FOR BACTERIA: CPT | Performed by: NURSE PRACTITIONER

## 2021-01-02 PROCEDURE — 82728 ASSAY OF FERRITIN: CPT | Performed by: EMERGENCY MEDICINE

## 2021-01-02 PROCEDURE — 99244 OFF/OP CNSLTJ NEW/EST MOD 40: CPT | Performed by: INTERNAL MEDICINE

## 2021-01-02 PROCEDURE — 0 IOPAMIDOL PER 1 ML: Performed by: EMERGENCY MEDICINE

## 2021-01-02 PROCEDURE — 96361 HYDRATE IV INFUSION ADD-ON: CPT

## 2021-01-02 PROCEDURE — 99219 PR INITIAL OBSERVATION CARE/DAY 50 MINUTES: CPT | Performed by: INTERNAL MEDICINE

## 2021-01-02 PROCEDURE — 85610 PROTHROMBIN TIME: CPT | Performed by: NURSE PRACTITIONER

## 2021-01-02 PROCEDURE — 25010000002 MORPHINE PER 10 MG: Performed by: NURSE PRACTITIONER

## 2021-01-02 PROCEDURE — 99285 EMERGENCY DEPT VISIT HI MDM: CPT

## 2021-01-02 PROCEDURE — 74177 CT ABD & PELVIS W/CONTRAST: CPT

## 2021-01-02 PROCEDURE — 25010000002 ENOXAPARIN PER 10 MG: Performed by: INTERNAL MEDICINE

## 2021-01-02 PROCEDURE — 85025 COMPLETE CBC W/AUTO DIFF WBC: CPT | Performed by: NURSE PRACTITIONER

## 2021-01-02 PROCEDURE — C9803 HOPD COVID-19 SPEC COLLECT: HCPCS

## 2021-01-02 PROCEDURE — 85379 FIBRIN DEGRADATION QUANT: CPT | Performed by: INTERNAL MEDICINE

## 2021-01-02 PROCEDURE — 93005 ELECTROCARDIOGRAM TRACING: CPT

## 2021-01-02 PROCEDURE — 0202U NFCT DS 22 TRGT SARS-COV-2: CPT | Performed by: NURSE PRACTITIONER

## 2021-01-02 PROCEDURE — 25010000002 ONDANSETRON PER 1 MG: Performed by: NURSE PRACTITIONER

## 2021-01-02 PROCEDURE — 71275 CT ANGIOGRAPHY CHEST: CPT

## 2021-01-02 PROCEDURE — 93005 ELECTROCARDIOGRAM TRACING: CPT | Performed by: EMERGENCY MEDICINE

## 2021-01-02 PROCEDURE — 87635 SARS-COV-2 COVID-19 AMP PRB: CPT | Performed by: EMERGENCY MEDICINE

## 2021-01-02 PROCEDURE — 84484 ASSAY OF TROPONIN QUANT: CPT | Performed by: NURSE PRACTITIONER

## 2021-01-02 PROCEDURE — 86140 C-REACTIVE PROTEIN: CPT | Performed by: NURSE PRACTITIONER

## 2021-01-02 PROCEDURE — 96372 THER/PROPH/DIAG INJ SC/IM: CPT

## 2021-01-02 PROCEDURE — 25010000002 CEFTRIAXONE PER 250 MG: Performed by: NURSE PRACTITIONER

## 2021-01-02 RX ORDER — MONTELUKAST SODIUM 10 MG/1
10 TABLET ORAL NIGHTLY
Status: DISCONTINUED | OUTPATIENT
Start: 2021-01-02 | End: 2021-01-04 | Stop reason: HOSPADM

## 2021-01-02 RX ORDER — ALUMINA, MAGNESIA, AND SIMETHICONE 2400; 2400; 240 MG/30ML; MG/30ML; MG/30ML
15 SUSPENSION ORAL EVERY 6 HOURS PRN
Status: DISCONTINUED | OUTPATIENT
Start: 2021-01-02 | End: 2021-01-04 | Stop reason: HOSPADM

## 2021-01-02 RX ORDER — ASPIRIN 81 MG/1
81 TABLET ORAL DAILY
Status: DISCONTINUED | OUTPATIENT
Start: 2021-01-03 | End: 2021-01-04 | Stop reason: HOSPADM

## 2021-01-02 RX ORDER — MONTELUKAST SODIUM 10 MG/1
10 TABLET ORAL NIGHTLY
COMMUNITY
End: 2021-06-06 | Stop reason: SDUPTHER

## 2021-01-02 RX ORDER — BISACODYL 5 MG/1
5 TABLET, DELAYED RELEASE ORAL DAILY PRN
Status: DISCONTINUED | OUTPATIENT
Start: 2021-01-02 | End: 2021-01-04 | Stop reason: HOSPADM

## 2021-01-02 RX ORDER — ONDANSETRON 4 MG/1
4 TABLET, FILM COATED ORAL EVERY 6 HOURS PRN
Status: DISCONTINUED | OUTPATIENT
Start: 2021-01-02 | End: 2021-01-04 | Stop reason: HOSPADM

## 2021-01-02 RX ORDER — ACETAMINOPHEN 160 MG/5ML
650 SOLUTION ORAL EVERY 4 HOURS PRN
Status: DISCONTINUED | OUTPATIENT
Start: 2021-01-02 | End: 2021-01-04 | Stop reason: HOSPADM

## 2021-01-02 RX ORDER — ONDANSETRON 2 MG/ML
4 INJECTION INTRAMUSCULAR; INTRAVENOUS ONCE
Status: COMPLETED | OUTPATIENT
Start: 2021-01-02 | End: 2021-01-02

## 2021-01-02 RX ORDER — AZITHROMYCIN 250 MG/1
500 TABLET, FILM COATED ORAL ONCE
Status: COMPLETED | OUTPATIENT
Start: 2021-01-02 | End: 2021-01-02

## 2021-01-02 RX ORDER — CALCIUM CARBONATE 200(500)MG
2 TABLET,CHEWABLE ORAL 2 TIMES DAILY PRN
Status: DISCONTINUED | OUTPATIENT
Start: 2021-01-02 | End: 2021-01-04 | Stop reason: HOSPADM

## 2021-01-02 RX ORDER — SODIUM CHLORIDE 0.9 % (FLUSH) 0.9 %
10 SYRINGE (ML) INJECTION EVERY 12 HOURS SCHEDULED
Status: DISCONTINUED | OUTPATIENT
Start: 2021-01-02 | End: 2021-01-04 | Stop reason: HOSPADM

## 2021-01-02 RX ORDER — ALLOPURINOL 300 MG/1
300 TABLET ORAL DAILY
Status: DISCONTINUED | OUTPATIENT
Start: 2021-01-02 | End: 2021-01-04 | Stop reason: HOSPADM

## 2021-01-02 RX ORDER — NITROGLYCERIN 0.4 MG/1
0.4 TABLET SUBLINGUAL
Status: DISCONTINUED | OUTPATIENT
Start: 2021-01-02 | End: 2021-01-04 | Stop reason: HOSPADM

## 2021-01-02 RX ORDER — ONDANSETRON 2 MG/ML
4 INJECTION INTRAMUSCULAR; INTRAVENOUS EVERY 6 HOURS PRN
Status: DISCONTINUED | OUTPATIENT
Start: 2021-01-02 | End: 2021-01-04 | Stop reason: HOSPADM

## 2021-01-02 RX ORDER — SODIUM CHLORIDE 0.9 % (FLUSH) 0.9 %
10 SYRINGE (ML) INJECTION AS NEEDED
Status: DISCONTINUED | OUTPATIENT
Start: 2021-01-02 | End: 2021-01-04 | Stop reason: HOSPADM

## 2021-01-02 RX ORDER — MORPHINE SULFATE 4 MG/ML
2 INJECTION, SOLUTION INTRAMUSCULAR; INTRAVENOUS ONCE
Status: COMPLETED | OUTPATIENT
Start: 2021-01-02 | End: 2021-01-02

## 2021-01-02 RX ORDER — ASPIRIN 81 MG/1
324 TABLET, CHEWABLE ORAL ONCE
Status: COMPLETED | OUTPATIENT
Start: 2021-01-02 | End: 2021-01-02

## 2021-01-02 RX ORDER — PANTOPRAZOLE SODIUM 40 MG/1
40 TABLET, DELAYED RELEASE ORAL EVERY MORNING
Status: DISCONTINUED | OUTPATIENT
Start: 2021-01-03 | End: 2021-01-04 | Stop reason: HOSPADM

## 2021-01-02 RX ORDER — CHOLECALCIFEROL (VITAMIN D3) 125 MCG
5 CAPSULE ORAL NIGHTLY PRN
Status: DISCONTINUED | OUTPATIENT
Start: 2021-01-02 | End: 2021-01-04 | Stop reason: HOSPADM

## 2021-01-02 RX ORDER — ACETAMINOPHEN 650 MG/1
650 SUPPOSITORY RECTAL EVERY 4 HOURS PRN
Status: DISCONTINUED | OUTPATIENT
Start: 2021-01-02 | End: 2021-01-04 | Stop reason: HOSPADM

## 2021-01-02 RX ORDER — ACETAMINOPHEN 325 MG/1
650 TABLET ORAL EVERY 4 HOURS PRN
Status: DISCONTINUED | OUTPATIENT
Start: 2021-01-02 | End: 2021-01-04 | Stop reason: HOSPADM

## 2021-01-02 RX ORDER — BISACODYL 10 MG
10 SUPPOSITORY, RECTAL RECTAL DAILY PRN
Status: DISCONTINUED | OUTPATIENT
Start: 2021-01-02 | End: 2021-01-04 | Stop reason: HOSPADM

## 2021-01-02 RX ORDER — OMEPRAZOLE 40 MG/1
40 CAPSULE, DELAYED RELEASE ORAL DAILY
COMMUNITY

## 2021-01-02 RX ADMIN — IOPAMIDOL 100 ML: 755 INJECTION, SOLUTION INTRAVENOUS at 08:27

## 2021-01-02 RX ADMIN — ASPIRIN 81 MG CHEWABLE TABLET 324 MG: 81 TABLET CHEWABLE at 07:27

## 2021-01-02 RX ADMIN — NITROGLYCERIN 0.4 MG: 0.4 TABLET SUBLINGUAL at 14:30

## 2021-01-02 RX ADMIN — ALLOPURINOL 300 MG: 300 TABLET ORAL at 18:27

## 2021-01-02 RX ADMIN — SODIUM CHLORIDE 1000 ML: 0.9 INJECTION, SOLUTION INTRAVENOUS at 06:59

## 2021-01-02 RX ADMIN — ONDANSETRON 4 MG: 2 INJECTION, SOLUTION INTRAMUSCULAR; INTRAVENOUS at 07:27

## 2021-01-02 RX ADMIN — CEFTRIAXONE SODIUM 1 G: 10 INJECTION, POWDER, FOR SOLUTION INTRAVENOUS at 10:32

## 2021-01-02 RX ADMIN — ENOXAPARIN SODIUM 40 MG: 40 INJECTION SUBCUTANEOUS at 18:26

## 2021-01-02 RX ADMIN — Medication 10 ML: at 21:01

## 2021-01-02 RX ADMIN — AZITHROMYCIN DIHYDRATE 500 MG: 250 TABLET, FILM COATED ORAL at 10:31

## 2021-01-02 RX ADMIN — MORPHINE SULFATE 2 MG: 4 INJECTION INTRAVENOUS at 07:27

## 2021-01-02 RX ADMIN — NITROGLYCERIN 0.4 MG: 0.4 TABLET SUBLINGUAL at 14:13

## 2021-01-02 RX ADMIN — Medication 10 ML: at 14:13

## 2021-01-02 RX ADMIN — SODIUM CHLORIDE 500 ML: 9 INJECTION, SOLUTION INTRAVENOUS at 18:27

## 2021-01-02 RX ADMIN — MONTELUKAST 10 MG: 10 TABLET, FILM COATED ORAL at 21:01

## 2021-01-02 NOTE — CONSULTS
[facility]   CONSULT NOTE    Referring Provider: Dr. Wan   Reason for Consultation: Unstable angina    Patient Care Team:  Job Santana MD as PCP - General (Internal Medicine)    Chief complaint - pain with deep breath        History of present illness:  Nile Chin is a 60 y.o. male with past medical history of  Pyloric stenosis (surgery as child), GERD, hypertension, thoracic aortic aneurysm, DVT, melanoma presented to the ED with chest pain and taking a deep breath.  Patient reports he was his normal self last evening; he walked his dog around 10pm, woke up around 2 am fine then at 5am woke up diaphoretic and unable to breath.  Then reports chest pain with deep breath.  Patient denies any lower extremity edema, he does have a history of DVT no longer anticoagulated. Non smoker, no family history of CAD. Labs reviewed showed negative troponin and EKG.  Patient had episode of diaphoresis and hypotension in the ED.  He reports his pain was about a 4/10 and down to 1/10 after nitroglycerin.  Patient remains somewhat hypotensive.  CT chest was negative for PE but did show possible pneumonia.  COVID negative. 2nd EKG showed non specific ST changes with PACs.     Review of Systems   Constitution: Positive for chills, diaphoresis and malaise/fatigue. Negative for fever.   Cardiovascular: Positive for chest pain. Negative for dyspnea on exertion, irregular heartbeat, leg swelling and near-syncope.        With deep breath   Respiratory: Negative for cough and shortness of breath.    Gastrointestinal: Positive for nausea. Negative for abdominal pain and vomiting.   Neurological: Positive for headaches.   All other systems reviewed and are negative.      History  Past Medical History:   Diagnosis Date   • Allergies    • GERD (gastroesophageal reflux disease)    • Gout    • Hypertension    • Melanoma (CMS/HCC)    • Pyloric stenosis        Past Surgical History:   Procedure  "Laterality Date   • APPENDECTOMY         History reviewed. No pertinent family history.    Social History     Tobacco Use   • Smoking status: Never Smoker   Substance Use Topics   • Alcohol use: No     Frequency: Never   • Drug use: No        (Not in a hospital admission)      Allergies:  Sulfa antibiotics and Suture    Scheduled Meds:enoxaparin, 40 mg, Subcutaneous, Q24H  sodium chloride, 10 mL, Intravenous, Q12H      Continuous Infusions:Pharmacy to Dose enoxaparin (LOVENOX),       PRN Meds:.•  acetaminophen **OR** acetaminophen **OR** acetaminophen  •  aluminum-magnesium hydroxide-simethicone  •  bisacodyl  •  bisacodyl  •  calcium carbonate  •  magnesium hydroxide  •  melatonin  •  nitroglycerin  •  ondansetron **OR** ondansetron  •  Pharmacy to Dose enoxaparin (LOVENOX)  •  [COMPLETED] Insert peripheral IV **AND** sodium chloride  •  sodium chloride        VITAL SIGNS  Vitals:    01/02/21 1455 01/02/21 1513 01/02/21 1543 01/02/21 1544   BP: 100/60 93/54 93/65    BP Location:       Patient Position:       Pulse:  96 94 101   Resp:       Temp:       TempSrc:       SpO2:  93% 94% 95%   Weight:       Height:           Flowsheet Rows      First Filed Value   Admission Height  180.3 cm (71\") Documented at 01/02/2021 0558   Admission Weight  114 kg (251 lb) Documented at 01/02/2021 0558           TELEMETRY: normal sinus rhythm     Physical Exam:  Vitals signs and nursing note reviewed.   Constitutional:       Appearance: Healthy appearance. Not in distress.   Neck:      Vascular: No JVR. JVD normal.   Pulmonary:      Effort: Pulmonary effort is normal.      Breath sounds: Normal breath sounds. No wheezing. No rhonchi. No rales.   Chest:      Chest wall: Not tender to palpatation.   Cardiovascular:      PMI at left midclavicular line. Normal rate. Regular rhythm. Normal S1. Normal S2.      Murmurs: There is no murmur.      No gallop. No click. No rub.   Pulses:     Intact distal pulses.   Edema:     Peripheral edema " absent.   Abdominal:      General: Bowel sounds are normal.      Palpations: Abdomen is soft.      Tenderness: There is no abdominal tenderness.   Musculoskeletal: Normal range of motion.         General: No tenderness.   Skin:     General: Skin is warm and dry.   Neurological:      General: No focal deficit present.      Mental Status: Alert and oriented to person, place and time.                  LAB RESULTS (LAST 7 DAYS)    CBC  Results from last 7 days   Lab Units 01/02/21  0627   WBC 10*3/mm3 7.70   RBC 10*6/mm3 4.72   HEMOGLOBIN g/dL 13.6   HEMATOCRIT % 40.3   MCV fL 85.4   PLATELETS 10*3/mm3 196       BMP  Results from last 7 days   Lab Units 01/02/21  0627   SODIUM mmol/L 140   POTASSIUM mmol/L 3.7   CHLORIDE mmol/L 99   CO2 mmol/L 30.0*   BUN mg/dL 17   CREATININE mg/dL 0.89   GLUCOSE mg/dL 126*       CMP   Results from last 7 days   Lab Units 01/02/21  0627   SODIUM mmol/L 140   POTASSIUM mmol/L 3.7   CHLORIDE mmol/L 99   CO2 mmol/L 30.0*   BUN mg/dL 17   CREATININE mg/dL 0.89   GLUCOSE mg/dL 126*   ALBUMIN g/dL 4.50   BILIRUBIN mg/dL 0.5   ALK PHOS U/L 94   AST (SGOT) U/L 22   ALT (SGPT) U/L 24         BNP        TROPONIN  Results from last 7 days   Lab Units 01/02/21  1442   TROPONIN T ng/mL <0.010       CoAg  Results from last 7 days   Lab Units 01/02/21  0627   INR  0.93   APTT seconds 24.4       Creatinine Clearance  Estimated Creatinine Clearance: 111.9 mL/min (by C-G formula based on SCr of 0.89 mg/dL).    ABG        Radiology  Ct Abdomen Pelvis With Contrast    Result Date: 1/2/2021   1.  Patchy bilateral lower lobe airspace disease, right greater than left favored to be secondary to pneumonia or less likely atelectasis. 2.  Colonic diverticulosis but no evidence of diverticulitis. 3.  No acute process seen within the abdomen or pelvis.  Electronically Signed By-Navjot Quijano MD On:1/2/2021 8:39 AM This report was finalized on [id number] by  Navjot Quijano MD.    Ct Chest Pulmonary  Embolism    Result Date: 1/2/2021   1.  No evidence of pulmonary embolus 2.  Patchy bilateral lower lobe airspace disease favored to be secondary to pneumonia or less likely atelectasis.  Electronically Signed By-Navjot Quijano MD On:1/2/2021 8:34 AM This report was finalized on [id number] by  Navjot Quijano MD.          EKG              I personally viewed and interpreted the patient's EKG/Telemetry data: Normal sinus rhythm premature atrial contractions    ECHOCARDIOGRAM:         STRESS MYOVIEW:    CARDIAC CATHETERIZATION:    OTHER:         Assessment/Plan       Hypertension    Chest pain  Chest pain with deep breath   Diaphoresis   Hypotension   History of gout   History of appendectomy, pyloric stenosis   Seasonal allergies  Allergy to sulfa   Non smoker, Family history of COPD, Cancer       PLAN:  Patient presented with chest pain with deep breath   Initial EKG normal sinus.  2nd EKG non specific ST changes, PACs noted   Patient diaphoretic intermittently  Hypotension after receiving nitroglycerin   Will give normal saline bolus   CT chest was negative for PE but shows bilateral patchy lower lobe airspace disease secondary to pneumonia   COVID negative RVP negative   Echocardiogram to assess LV function and valve abnormality   Stress myoview in am to rule out ischemia     Aspirin daily   Monitor blood pressure closely   Lipid panel in am       Further recommendations per Dr. Arrington     I discussed the patients findings and my recommendations with patient.    MONICA Ware  01/02/21  16:10 EST   Electronically signed by MONICA Ware, 01/02/21, 4:16 PM EST.    The patient was seen around 4:45 PM today    Nile Chin is a 60 y.o. male with past medical history of  Pyloric stenosis (surgery as child), GERD, hypertension, thoracic aortic aneurysm, DVT, melanoma presented to the ED with chest pain and taking a deep breath.  Patient reports he was his normal self last evening; he walked his dog  around 10pm, woke up around 2 am fine then at 5am woke up diaphoretic and unable to breath.  Then reports chest pain with deep breath.  Patient denies any lower extremity edema, he does have a history of DVT no longer anticoagulated. Non smoker, no family history of CAD. Labs reviewed showed negative troponin and EKG.  Patient had episode of diaphoresis and hypotension in the ED.  He reports his pain was about a 4/10 and down to 1/10 after nitroglycerin.  Patient remains somewhat hypotensive.  CT chest was negative for PE but did show possible pneumonia.  COVID negative. 2nd EKG showed non specific ST changes with PACs.    Chart was reviewed patient examined and spoke with patient and patient's wife through telephone.  Reviewed and agree with the assessment and plan as documented above.  Physical exam is essentially normal as documented.  The patient is a 60-year-old white male admitted with a history of chest pain which is somewhat pleuritic.  Patient had recurrence of chest discomfort and has hypotensive episode in the ER and had CT scan and PE was ruled out.  Patient was admitted to the hospital.  EKG showed no acute changes.  Troponin levels are negative.  Serial EKGs and troponin levels.  Ischemic cardiac work-up.  Stress Cardiolite test  Echocardiogram.  Further plan will depend on patient's progress.  ]]]]]]]]]]]]  Electronically signed by Bandar Arrington MD, 01/02/21, 4:46 PM EST.

## 2021-01-02 NOTE — ED NOTES
Dr. Wan sts she wants to try some SL nitro for chest pain. Will give Betty Jensen, LPN  01/02/21 0637

## 2021-01-02 NOTE — ED NOTES
Pt reports pain 2/10 which is improved after 2 SL nitro.   BP lowered and md made aware.      Betty Spaulding, LPN  01/02/21 144

## 2021-01-02 NOTE — PLAN OF CARE
Goal Outcome Evaluation:         Patient admitted with questionable PNA/cardiac issue. Patient to have echo and myoview in AM. Pt having pain in bilateral chest with deep breathing, still educated on importance of coughing and deep breathing. He is not having any productive coughing at this time. He did have low grade temp on arrival to unit of 99.7 which is new for him, Dr Wan was called and notified of this. She does not believe that COVID swabs were false negative at this point and stated she thinks this is cardiac. Nurse will continue to monitor.

## 2021-01-02 NOTE — ED NOTES
Pt calling out to nurses station. Upon reassessment of pt, pt is diaphoretic      Nini Porter RN  01/02/21 0766

## 2021-01-02 NOTE — H&P
[facility] Medicine Services      Patient Name: Nile Chin  : 1960  MRN: [id number]  Primary Care Physician: Job Santana MD  Date of admission: 2021    Patient Care Team:  Job Santana MD as PCP - General (Internal Medicine)          Subjective   History Present Illness     Chief Complaint:   Chief Complaint   Patient presents with   • Shortness of Breath        Mr. Chin is a 60 y.o.  presents to [facility] complaining of shortness of air accompanied by a sharp substernal chest pain that is worse with deep inspiration, worse when laying flat, alleviated by morphine and nitro while in the ED. The pain felt like a heavy pressure with each breath and radiated into his right shoulder and neck.  He has no previous hx of CAD and no Family hx of CAD, but he had a negative work-up done a few years ago.  Pt was in his normal state of health last night when walking the dog around 10 pm.  He woke up around 2am and was still okay. Then he woke up at 5 am with the aforementioned symptoms accompanied by diaphoresis.  He has a Hx of HTN, gout, GERD, esophageal stricture s/p stretching, melanoma, pyloric stenosis.    While in the ED, he initially had a BP of 142/106, but had a sudden episode of hypotension down to 82/42 with a pulse of 48, he was given a fluid bolus and his blood pressure recovered quickly.      Review of Systems   Constitution: Positive for diaphoresis and weight gain. Negative for chills, fever and malaise/fatigue.   HENT: Negative.    Eyes: Negative.    Cardiovascular: Positive for chest pain, dyspnea on exertion, orthopnea and paroxysmal nocturnal dyspnea. Negative for leg swelling and palpitations.   Respiratory: Positive for shortness of breath. Negative for cough, hemoptysis, sputum production and wheezing.    Skin: Negative.  Negative for rash and suspicious lesions.   Musculoskeletal: Negative.  Negative for arthritis, joint swelling  and neck pain.   Gastrointestinal: Negative.  Negative for abdominal pain, constipation, diarrhea, nausea and vomiting.   Genitourinary: Negative.  Negative for dysuria, flank pain, frequency, hematuria, incomplete emptying, nocturia and urgency.   Neurological: Negative.  Negative for dizziness, headaches, light-headedness and seizures.   Psychiatric/Behavioral: Negative.  Negative for depression. The patient does not have insomnia and is not nervous/anxious.    All other systems reviewed and are negative.          Personal History     Past Medical History:   Past Medical History:   Diagnosis Date   • Allergies    • GERD (gastroesophageal reflux disease)    • Gout    • Hypertension    • Melanoma (CMS/HCC)    • Pyloric stenosis        Surgical History:      Past Surgical History:   Procedure Laterality Date   • APPENDECTOMY             Family History: family history is not on file. Otherwise pertinent FHx was reviewed and unremarkable.     Social History:  reports that he has never smoked. He does not have any smokeless tobacco history on file. He reports that he does not drink alcohol or use drugs.      Medications:  Prior to Admission medications    Medication Sig Start Date End Date Taking? Authorizing Provider   allopurinol (ZYLOPRIM) 300 MG tablet Take 300 mg by mouth Daily. 12/9/11  Yes ProviderLisa MD   fluticasone (FLONASE) 50 MCG/ACT nasal spray 1 spray into the nostril(s) as directed by provider As Needed. 12/9/11  Yes Lisa Hunter MD   lisinopril (PRINIVIL,ZESTRIL) 40 MG tablet Take 40 mg by mouth Daily. 12/9/11  Yes ProviderLisa MD   montelukast (SINGULAIR) 10 MG tablet Take 10 mg by mouth Every Night.   Yes ProviderLisa MD   omeprazole (priLOSEC) 40 MG capsule Take 40 mg by mouth Daily.   Yes ProviderLisa MD   albuterol sulfate  (90 Base) MCG/ACT inhaler Inhale 2 puffs Every 4 (Four) Hours As Needed for Wheezing. 10/15/19 1/2/21  Vee Bae APRN      Apixaban (ELIQUIS STARTER PACK) tablet Take two 5 mg tablets by mouth every 12 hours for 7 days. Followed by one 5 mg tablet every 12 hours. (Dispense starter pack if available) 9/17/19 1/2/21  Vee Bae APRN   benzonatate (TESSALON) 200 MG capsule Take 1 capsule by mouth 3 (Three) Times a Day As Needed for Cough. 10/15/19 1/2/21  Vee Bae APRN   methylPREDNISolone (MEDROL, JARETT,) 4 MG tablet Take as directed on package instructions. 10/15/19 1/2/21  Vee Bae APRN       Allergies:    Allergies   Allergen Reactions   • Sulfa Antibiotics Rash   • Suture Rash     Cat gut suture       Objective   Objective     Vital Signs  Temp:  [98 °F (36.7 °C)] 98 °F (36.7 °C)  Heart Rate:  [] 104  Resp:  [20] 20  BP: ()/() 129/69  SpO2:  [94 %-98 %] 96 %  on   ;   Device (Oxygen Therapy): room air  Body mass index is 35.01 kg/m².    Physical Exam  Vitals signs reviewed.   Constitutional:       General: He is not in acute distress.     Appearance: Normal appearance. He is obese. He is ill-appearing and diaphoretic. He is not toxic-appearing.   HENT:      Head: Normocephalic and atraumatic.      Mouth/Throat:      Mouth: Mucous membranes are moist.      Pharynx: Oropharynx is clear.   Eyes:      Extraocular Movements: Extraocular movements intact.      Conjunctiva/sclera: Conjunctivae normal.      Pupils: Pupils are equal, round, and reactive to light.   Neck:      Musculoskeletal: Normal range of motion and neck supple.   Cardiovascular:      Rate and Rhythm: Normal rate and regular rhythm.      Pulses: Normal pulses.      Heart sounds: Normal heart sounds. No murmur. No gallop.    Pulmonary:      Effort: Pulmonary effort is normal. No respiratory distress.      Breath sounds: Normal breath sounds. No wheezing, rhonchi or rales.   Abdominal:      General: Bowel sounds are normal. There is no distension.      Palpations: Abdomen is soft.      Tenderness: There is no abdominal tenderness.    Musculoskeletal: Normal range of motion.         General: No swelling, tenderness, deformity or signs of injury.   Lymphadenopathy:      Cervical: No cervical adenopathy.   Skin:     General: Skin is warm.      Findings: No erythema, lesion or rash.   Neurological:      General: No focal deficit present.      Mental Status: He is alert and oriented to person, place, and time. Mental status is at baseline.   Psychiatric:         Mood and Affect: Mood normal.         Behavior: Behavior normal.         Thought Content: Thought content normal.         Judgment: Judgment normal.         Results Review:  I have personally reviewed most recent cardiac tracings, lab results and radiology images and interpretations and agree with findings.    Results from last 7 days   Lab Units 01/02/21  0627   WBC 10*3/mm3 7.70   HEMOGLOBIN g/dL 13.6   HEMATOCRIT % 40.3   PLATELETS 10*3/mm3 196   INR  0.93     Results from last 7 days   Lab Units 01/02/21  0627   SODIUM mmol/L 140   POTASSIUM mmol/L 3.7   CHLORIDE mmol/L 99   CO2 mmol/L 30.0*   BUN mg/dL 17   CREATININE mg/dL 0.89   GLUCOSE mg/dL 126*   CALCIUM mg/dL 9.8   ALT (SGPT) U/L 24   AST (SGOT) U/L 22   TROPONIN T ng/mL <0.010   PROBNP pg/mL 33.5   PROCALCITONIN ng/mL 0.05     Estimated Creatinine Clearance: 111.9 mL/min (by C-G formula based on SCr of 0.89 mg/dL).  Brief Urine Lab Results  (Last result in the past 365 days)      Color   Clarity   Blood   Leuk Est   Nitrite   Protein   CREAT   Urine HCG        01/02/21 1033 Yellow Clear Negative Negative Negative Negative               Microbiology Results (last 10 days)     Procedure Component Value - Date/Time    S. Pneumo Ag Urine or CSF - Urine, Urine, Clean Catch [[id number]]  (Normal) Collected: 01/02/21 1033    Lab Status: Final result Specimen: Urine, Clean Catch Updated: 01/02/21 1112     Strep Pneumo Ag Negative    Legionella Antigen, Urine - Urine, Urine, Clean Catch [[id number]]  (Normal) Collected: 01/02/21 1033     Lab Status: Final result Specimen: Urine, Clean Catch Updated: 01/02/21 1112     LEGIONELLA ANTIGEN, URINE Negative    Respiratory Panel PCR w/COVID-19(SARS-CoV-2) HERNAN/ROSA/SHAMEKA/PAD/COR/MAD/DORIAN In-House, NP Swab in UTM/VTM, 3-4 HR TAT - Swab, Nasopharynx [[id number]]  (Normal) Collected: 01/02/21 0857    Lab Status: Final result Specimen: Swab from Nasopharynx Updated: 01/02/21 1007     ADENOVIRUS, PCR Not Detected     Coronavirus 229E Not Detected     Coronavirus HKU1 Not Detected     Coronavirus NL63 Not Detected     Coronavirus OC43 Not Detected     COVID19 Not Detected     Human Metapneumovirus Not Detected     Human Rhinovirus/Enterovirus Not Detected     Influenza A PCR Not Detected     Influenza B PCR Not Detected     Parainfluenza Virus 1 Not Detected     Parainfluenza Virus 2 Not Detected     Parainfluenza Virus 3 Not Detected     Parainfluenza Virus 4 Not Detected     RSV, PCR Not Detected     Bordetella pertussis pcr Not Detected     Bordetella parapertussis PCR Not Detected     Chlamydophila pneumoniae PCR Not Detected     Mycoplasma pneumo by PCR Not Detected    Narrative:      Fact sheet for providers: https://docs.[employer]/wp-content/uploads/HNL5305-2612-UN7.1-EUA-Provider-Fact-Sheet-3.pdf    Fact sheet for patients: https://docs.[employer]/wp-content/uploads/EKG0145-1645-ZN2.1-EUA-Patient-Fact-Sheet-1.pdf    Test performed by PCR.    COVID-19, ABBOTT IN-HOUSE,NASAL Swab (NO TRANSPORT MEDIA) 2 HR TAT - Swab, Nasopharynx [[id number]]  (Normal) Collected: 01/02/21 0700    Lab Status: Final result Specimen: Swab from Nasopharynx Updated: 01/02/21 0753     COVID19 Presumptive Negative    Narrative:      Fact sheet for providers: https://www.fda.gov/media/413192/download     Fact sheet for patients: https://www.fda.gov/media/461776/download    Test performed by PCR.  If inconsistent with clinical signs and symptoms patient should be tested with different authorized molecular test.          ECG/EMG  Results (most recent)     Procedure Component Value Units Date/Time    ECG 12 Lead [[id number]] Collected: 01/02/21 0617     Updated: 01/02/21 0619     QT Interval 386 ms     Narrative:      HEART RATE= 80  bpm  RR Interval= 748  ms  VA Interval= 164  ms  P Horizontal Axis= 5  deg  P Front Axis= 41  deg  QRSD Interval= 113  ms  QT Interval= 386  ms  QRS Axis= 29  deg  T Wave Axis= 68  deg  - NORMAL ECG -  Sinus rhythm  When compared with ECG of 15-Oct-2019 10:28:18,  No significant change  Electronically Signed By:   Date and Time of Study: 2021-01-02 06:17:02    ECG 12 Lead [[id number]] Collected: 01/02/21 0647     Updated: 01/02/21 0655     QT Interval 450 ms     Narrative:      HEART RATE= 62  bpm  RR Interval= 1036  ms  VA Interval= 142  ms  P Horizontal Axis= 29  deg  P Front Axis= 22  deg  QRSD Interval= 115  ms  QT Interval= 450  ms  QRS Axis= -20  deg  T Wave Axis= 102  deg  - ABNORMAL ECG -  Sinus rhythm  Atrial premature complexes  Nonspecific intraventricular conduction delay  Abnrm T, consider ischemia, anterolateral lds  Electronically Signed By:   Date and Time of Study: 2021-01-02 06:47:43    ECG 12 Lead [[id number]] Resulted: 01/02/21 1106     Updated: 01/02/21 1106    ECG 12 Lead [[id number]] Resulted: 01/02/21 1106     Updated: 01/02/21 1106          Results for orders placed during the hospital encounter of 09/17/19   Duplex Venous Lower Extremity - Right CAR    Narrative · Acute right lower extremity deep vein thrombosis noted in the   gastrocnemius/soleal.  · All other right sided veins appeared normal.               Ct Abdomen Pelvis With Contrast    Result Date: 1/2/2021   1.  Patchy bilateral lower lobe airspace disease, right greater than left favored to be secondary to pneumonia or less likely atelectasis. 2.  Colonic diverticulosis but no evidence of diverticulitis. 3.  No acute process seen within the abdomen or pelvis.  Electronically Signed By-Navjot Quijano MD On:1/2/2021 8:39 AM This  report was finalized on [id number] by  Navjot Quijano MD.    Ct Chest Pulmonary Embolism    Result Date: 1/2/2021   1.  No evidence of pulmonary embolus 2.  Patchy bilateral lower lobe airspace disease favored to be secondary to pneumonia or less likely atelectasis.  Electronically Signed By-Navjot Quijano MD On:1/2/2021 8:34 AM This report was finalized on [id number] by  Navjot Quijano MD.        Estimated Creatinine Clearance: 111.9 mL/min (by C-G formula based on SCr of 0.89 mg/dL).    Assessment/Plan   Assessment/Plan       Active Hospital Problems    Diagnosis  POA   • **Chest pain [R07.9]  Yes   • 2019-nCoV not detected [Z03.818]  Not Applicable   • Gout [M10.9]  Yes   • Hypertension [I10]  Unknown   • GERD with stricture [K21.9, K22.2]  No   • Obstructive sleep apnea [G47.33]  Yes      Resolved Hospital Problems   No resolved problems to display.       Chest pain  -No previous cardiac history, although he has had a negative work-up in the past  -Serial troponin, repeat EKG, to rule out ACS  -Patient tested negative for COVID-19  -CT chest Abdo pelvis reviewed above  -Check 2D echo for LV function and any area of hypokinesis, especially given acute hypotensive episode  -Consider Lexiscan stress test in a.m.  -Continuous cardiac monitoring  -Check lipids with a.m. labs  -Consult cardiology for additional evaluation    Essential hypertension  -On lisinopril 40 mg daily at home  -Held on admission due to acute hypotension episode while in ED  -Continue to monitor BP and vitals for now  -Will restart lisinopril when BP will tolerate    GERD with history of esophageal stricture and dilatation  -Continue pantoprazole    Obstructive sleep apnea  -Patient is compliant with home CPAP  -May bring home machine in or have settings called in for CPAP at night    Gout  -Continue allopurinol      VTE Prophylaxis -   Mechanical Order History:      Ordered        01/02/21 1243  Place Sequential Compression Device  Once          01/02/21 1243  Maintain Sequential Compression Device  Continuous                 Pharmalogical Order History:      Ordered     Dose Route Frequency Stop    01/02/21 1243  Pharmacy to Dose enoxaparin (LOVENOX)     Question:  Indication of use  Answer:  Prophylaxis    -- XX Continuous PRN --                CODE STATUS:    Code Status and Medical Interventions:   Ordered at: 01/02/21 1243     Code Status:    CPR     Medical Interventions (Level of Support Prior to Arrest):    Full       This patient has been examined wearing appropriate Personal Protective Equipment. 01/02/21      I discussed the patient's findings and my recommendations with patient and nursing staff.      Signature: Electronically signed by Sydnee Wan MD, 01/02/21, 4:28 PM EST.]    [facility] Hospitalist Team

## 2021-01-02 NOTE — ED PROVIDER NOTES
Subjective   Chief complaint: Pain with inspiration      Context: Patient is a 60-year-old male who comes in complaining of pain with deep inspiration in his substernal chest.  He denies any swelling in his legs or feet recent trauma surgery mobilization prior history of PE; prior history of DVT lower extremity in 2019 not currently anticoagulated.  He denies any cough congestion or fever.  He denies any shortness of breath.  He describes the pain as a sharp stabbing sensation that is nonradiating when he takes a deep breath.  He denies any abdominal pain nausea vomiting or diarrhea.  No melena hematochezia.  No urinary complaints.  No prior history of cardiac disease.  He states he does have a history of a thoracic aortic aneurysm but had a scan in November that showed it was stable.  He denies any Covid exposure.  He denies any prior  Cardiac evaluation    Duration: this morning    Timing: intermittent    Severity: moderate    Associated symptoms: worse with deep inspiration          PCP:   berto          Review of Systems   Constitutional: Negative for fever.   HENT: Negative.    Eyes: Negative for visual disturbance.   Respiratory: Negative.    Cardiovascular: Negative for palpitations and leg swelling.        Chest pain with deep inspiration   Gastrointestinal: Negative.    Genitourinary: Negative.    Musculoskeletal: Positive for myalgias.   Skin: Negative.    Allergic/Immunologic: Negative for immunocompromised state.   Neurological: Negative.    Hematological: Does not bruise/bleed easily.   Psychiatric/Behavioral: Negative for confusion.       Past Medical History:   Diagnosis Date   • Allergies    • GERD (gastroesophageal reflux disease)    • Gout    • Hypertension    • Melanoma (CMS/HCC)    • Pyloric stenosis        Allergies   Allergen Reactions   • Sulfa Antibiotics Rash   • Suture Rash     Cat gut suture       Past Surgical History:   Procedure Laterality Date   • APPENDECTOMY         History  reviewed. No pertinent family history.    Social History     Socioeconomic History   • Marital status:      Spouse name: Not on file   • Number of children: Not on file   • Years of education: Not on file   • Highest education level: Not on file   Tobacco Use   • Smoking status: Never Smoker   Substance and Sexual Activity   • Alcohol use: No     Frequency: Never   • Drug use: No   • Sexual activity: Defer           Objective   Physical Exam     Vital signs and triage nurse note reviewed.   Constitutional: Awake, alert; uncomfortable  HEENT: Normocephalic, atraumatic; pupils are PERRL with intact EOM; oropharynx is pink and moist without exudate or erythema.   Neck: Supple, full range of motion without pain;     Cardiovascular: Regular rate and rhythm, normal S1-S2.   Pulmonary: Respiratory effort regular nonlabored, breath sounds clear to auscultation all fields.    Abdomen: Soft, nontender nondistended with normoactive bowel sounds; no rebound or guarding. No pulsatile masses palpated  Musculoskeletal: Independent range of motion of all extremities with no palpable tenderness or edema.  Negative Leeann bilaterally   Neuro: Alert oriented x3, speech is clear and appropriate, GCS 15   Skin:  Fleshtone pale, diaphoretic, intact; no erythematous or petechial rash or lesion       ECG 12 Lead      Date/Time: 1/2/2021 6:17 AM  Performed by: Corinna Mejias APRN  Authorized by: Corinna Mejias APRN   Interpreted by physician (fay)  Comparison: compared with previous ECG from 10/15/2019  Comparison to previous ECG: Sinus rhythm rate of 78  Rhythm: sinus rhythm  BPM: 80  Clinical impression: normal ECG    ECG 12 Lead      Date/Time: 1/2/2021 6:47 AM  Performed by: Corinna Mejias APRN  Authorized by: Corinna Mejias APRN   Interpreted by physician (veronique)  Rhythm: sinus rhythm  Ectopy: atrial premature contractions  BPM: 62                   ED Course  ED Course as of Jan 02 1106   Sat Jan 02, 2021    0635 Assumed care of patient.    [JW]   0829 Reviewed CT, still pending radiology report.  Additional lab orders were placed    [JW]      ED Course User Index  [JW] Corinna Mejias APRN           Labs Reviewed   COMPREHENSIVE METABOLIC PANEL - Abnormal; Notable for the following components:       Result Value    Glucose 126 (*)     CO2 30.0 (*)     All other components within normal limits    Narrative:     GFR Normal >60  Chronic Kidney Disease <60  Kidney Failure <15     CBC WITH AUTO DIFFERENTIAL - Abnormal; Notable for the following components:    Lymphocyte % 15.4 (*)     All other components within normal limits   C-REACTIVE PROTEIN - Abnormal; Notable for the following components:    C-Reactive Protein 0.90 (*)     All other components within normal limits   COVID-19,ABBOTT IN-HOUSE,NASAL SWAB (NO TRANSPORT MEDIA) 2 HR TAT - Normal    Narrative:     Fact sheet for providers: https://www.fda.gov/media/000183/download     Fact sheet for patients: https://www.[employer].gov/media/788393/download    Test performed by PCR.  If inconsistent with clinical signs and symptoms patient should be tested with different authorized molecular test.   RESPIRATORY PANEL PCR W/ COVID-19 (SARS-COV-2) HERNAN/ROSA/SHAMEKA/PAD/COR/MAD/DORIAN IN-HOUSE, NP SWAB IN UTM/VTP, 3-4 HR TAT - Normal    Narrative:     Fact sheet for providers: https://docs.[employer]/wp-content/uploads/KJU4010-7899-HO5.1-EUA-Provider-Fact-Sheet-3.pdf    Fact sheet for patients: https://docs.[employer]/wp-content/uploads/ORA8650-4548-RQ1.1-EUA-Patient-Fact-Sheet-1.pdf    Test performed by PCR.   APTT - Normal   PROTIME-INR - Normal   BNP (IN-HOUSE) - Normal    Narrative:     Among patients with dyspnea, NT-proBNP is highly sensitive for the detection of acute congestive heart failure. In addition NT-proBNP of <300 pg/ml effectively rules out acute congestive heart failure with 99% negative predictive value.    Results may be falsely decreased if patient taking  "Biotin.     TROPONIN (IN-HOUSE) - Normal    Narrative:     Troponin T Reference Range:  <= 0.03 ng/mL-   Negative for AMI  >0.03 ng/mL-     Abnormal for myocardial necrosis.  Clinicians would have to utilize clinical acumen, EKG, Troponin and serial changes to determine if it is an Acute Myocardial Infarction or myocardial injury due to an underlying chronic condition.       Results may be falsely decreased if patient taking Biotin.     LACTATE DEHYDROGENASE - Normal   PROCALCITONIN - Normal    Narrative:     As a Marker for Sepsis (Non-Neonates):   1. <0.5 ng/mL represents a low risk of severe sepsis and/or septic shock.  1. >2 ng/mL represents a high risk of severe sepsis and/or septic shock.    As a Marker for Lower Respiratory Tract Infections that require antibiotic therapy:  PCT on Admission     Antibiotic Therapy             6-12 Hrs later  > 0.5                Strongly Recommended            >0.25 - <0.5         Recommended  0.1 - 0.25           Discouraged                   Remeasure/reassess PCT  <0.1                 Strongly Discouraged          Remeasure/reassess PCT      As 28 day mortality risk marker: \"Change in Procalcitonin Result\" (> 80 % or <=80 %) if Day 0 (or Day 1) and Day 4 values are available. Refer to http://www.[employer] [facility]INGSOklahoma [facility] – [place] CityInbentapct-calculator.com/   Change in PCT <=80 %   A decrease of PCT levels below or equal to 80 % defines a positive change in PCT test result representing a higher risk for 28-day all-cause mortality of patients diagnosed with severe sepsis or septic shock.  Change in PCT > 80 %   A decrease of PCT levels of more than 80 % defines a negative change in PCT result representing a lower risk for 28-day all-cause mortality of patients diagnosed with severe sepsis or septic shock.                Results may be falsely decreased if patient taking Biotin.    URINALYSIS W/ CULTURE IF INDICATED - Normal    Narrative:     Urine microscopic not indicated.   BLOOD CULTURE   BLOOD CULTURE "   STREP PNEUMO AG, URINE OR CSF   LEGIONELLA ANTIGEN, URINE   RAINBOW DRAW    Narrative:     The following orders were created for panel order [place] Draw.  Procedure                               Abnormality         Status                     ---------                               -----------         ------                     Light Blue Top[[id number]]                                   Final result               Green Top (Gel)[[id number]]                                  Final result               Lavender Top[[id number]]                                     Final result               Gold Top - SST[[id number]]                                   Final result                 Please view results for these tests on the individual orders.   FERRITIN   LIGHT BLUE TOP   GREEN TOP   LAVENDER TOP   GOLD TOP - SST   CBC AND DIFFERENTIAL    Narrative:     The following orders were created for panel order CBC & Differential.  Procedure                               Abnormality         Status                     ---------                               -----------         ------                     CBC Auto Differential[[id number]]        Abnormal            Final result                 Please view results for these tests on the individual orders.     Medications   sodium chloride 0.9 % flush 10 mL (has no administration in time range)   sodium chloride 0.9 % bolus 1,000 mL (0 mL Intravenous Stopped 1/2/21 0729)   Morphine sulfate (PF) injection 2 mg (2 mg Intravenous Given 1/2/21 0727)   ondansetron (ZOFRAN) injection 4 mg (4 mg Intravenous Given 1/2/21 0727)   aspirin chewable tablet 324 mg (324 mg Oral Given 1/2/21 0727)   iopamidol (ISOVUE-370) 76 % injection 100 mL (100 mL Intravenous Given 1/2/21 0827)   cefTRIAXone (ROCEPHIN) in SWFI 1 gram/10ml IV PUSH syringe (1 g Intravenous Given 1/2/21 1032)   azithromycin (ZITHROMAX) tablet 500 mg (500 mg Oral Given 1/2/21 1031)     Ct Abdomen Pelvis With Contrast    Result Date:  1/2/2021   1.  Patchy bilateral lower lobe airspace disease, right greater than left favored to be secondary to pneumonia or less likely atelectasis. 2.  Colonic diverticulosis but no evidence of diverticulitis. 3.  No acute process seen within the abdomen or pelvis.  Electronically Signed By-Navjot Quijano MD On:1/2/2021 8:39 AM This report was finalized on [id number] by  Navjot Quijano MD.    Ct Chest Pulmonary Embolism    Result Date: 1/2/2021   1.  No evidence of pulmonary embolus 2.  Patchy bilateral lower lobe airspace disease favored to be secondary to pneumonia or less likely atelectasis.  Electronically Signed By-Navjot Quijano MD On:1/2/2021 8:34 AM This report was finalized on [id number] by  Navjot Quijano MD.                                    MDM  Number of Diagnoses or Management Options  2019-nCoV not detected:   Bradycardia:   Chest pain, unspecified type:   Hypotension, unspecified hypotension type:   Pneumonia due to infectious organism, unspecified laterality, unspecified part of lung:   Diagnosis management comments: Chart review: CT chest with contrast on 11/11/2020: Stable mild ectasia of thoracic aorta measuring 3.8 cm at the a sending thoracic aorta and 3 cm at the descending aorta.  Circumferential thickening of the wall of the mid to distal esophagus    Comorbidities:  has a past medical history of Allergies, GERD (gastroesophageal reflux disease), Gout, Hypertension, Melanoma (CMS/HCC), and Pyloric stenosis.  Differentials: PE pneumonia virus electrode abnormalities STEMI non-STEMI   not all inclusive of differentials considered  Discussion with provider: Hospitalist- lester  Radiology interpretation:  X-rays reviewed by me and interpreted by radiologist,   Ct Abdomen Pelvis With Contrast    Result Date: 1/2/2021   1.  Patchy bilateral lower lobe airspace disease, right greater than left favored to be secondary to pneumonia or less likely atelectasis. 2.  Colonic diverticulosis  but no evidence of diverticulitis. 3.  No acute process seen within the abdomen or pelvis.  Electronically Signed By-Navjot Quijano MD On:1/2/2021 8:39 AM This report was finalized on [id number] by  Navjot Quijano MD.    Ct Chest Pulmonary Embolism    Result Date: 1/2/2021   1.  No evidence of pulmonary embolus 2.  Patchy bilateral lower lobe airspace disease favored to be secondary to pneumonia or less likely atelectasis.  Electronically Signed By-Navjot Quijano MD On:1/2/2021 8:34 AM This report was finalized on [id number] by  Navjot Quijano MD.    Lab interpretation:  Labs viewed by me significant for, CRP 0.9, urine tests are pending on admission    Appropriate PPE worn during exam.  Patient was noted to be bradycardic and hypotension on my initial evaluation-nursing staff report he was not given any medications prior to arrival and did not take his blood pressure medicine this morning; they state he had been in the room for approximately 30 minutes before I came on shift and was noted to be hemodynamically stable and had an acute change in status on my evaluation; patient did not appear to be in severe acute pain distress to explain his change in hemodynamic status (ie vagal).  .  Repeat EKG was obtained patient was given IV fluids.  I discussed and he was seen and evaluated by Dr. García while in the ER who agrees with plan of care. Once his blood pressure had stabilized he was given Zofran morphine and aspirin.    After review of his CT respiratory PCR and Covid labs were ordered.  He was given Rocephin and azithromycin.  He has not been hypoxic therefore was not given Decadron.  On reexam he states he is feeling mildly improved but still having occasional pain.  He states his blood pressure typically runs systolic around 130.    i discussed findings with patient who voices understanding of admission  I discussed with the hospitalist who agreed to admit           Final diagnoses:   2019-nCoV not  detected   Pneumonia due to infectious organism, unspecified laterality, unspecified part of lung   Hypotension, unspecified hypotension type   Bradycardia   Chest pain, unspecified type            Corinna Mejias, APRN  01/02/21 1106       Godwin García MD  01/03/21 1291

## 2021-01-02 NOTE — ED NOTES
Pt calling out to nurses station. Upon reassessment, pt is diaphoretic, with hr in 40's. Provider notified. Repeat ekg obtained and fluids started. Pt quickly returned to baseline with intervention     Nini Porter RN  01/02/21 0784

## 2021-01-03 ENCOUNTER — APPOINTMENT (OUTPATIENT)
Dept: CARDIOLOGY | Facility: HOSPITAL | Age: 61
End: 2021-01-03

## 2021-01-03 ENCOUNTER — APPOINTMENT (OUTPATIENT)
Dept: NUCLEAR MEDICINE | Facility: HOSPITAL | Age: 61
End: 2021-01-03

## 2021-01-03 LAB
ALBUMIN SERPL-MCNC: 3.7 G/DL (ref 3.5–5.2)
ALBUMIN/GLOB SERPL: 1.6 G/DL
ALP SERPL-CCNC: 73 U/L (ref 39–117)
ALT SERPL W P-5'-P-CCNC: 17 U/L (ref 1–41)
ANION GAP SERPL CALCULATED.3IONS-SCNC: 6 MMOL/L (ref 5–15)
AST SERPL-CCNC: 17 U/L (ref 1–40)
BASOPHILS # BLD AUTO: 0 10*3/MM3 (ref 0–0.2)
BASOPHILS NFR BLD AUTO: 0.5 % (ref 0–1.5)
BH CV ECHO MEAS - ACS: 2.3 CM
BH CV ECHO MEAS - AO MAX PG (FULL): 6.1 MMHG
BH CV ECHO MEAS - AO MAX PG: 10.4 MMHG
BH CV ECHO MEAS - AO MEAN PG (FULL): 3.4 MMHG
BH CV ECHO MEAS - AO MEAN PG: 6.4 MMHG
BH CV ECHO MEAS - AO ROOT AREA (BSA CORRECTED): 1.4
BH CV ECHO MEAS - AO ROOT AREA: 8.2 CM^2
BH CV ECHO MEAS - AO ROOT DIAM: 3.2 CM
BH CV ECHO MEAS - AO V2 MAX: 161.6 CM/SEC
BH CV ECHO MEAS - AO V2 MEAN: 123.4 CM/SEC
BH CV ECHO MEAS - AO V2 VTI: 28.6 CM
BH CV ECHO MEAS - AORTIC HR: 88.6 BPM
BH CV ECHO MEAS - AORTIC R-R: 0.68 SEC
BH CV ECHO MEAS - ASC AORTA: 3.9 CM
BH CV ECHO MEAS - AVA(I,A): 3.4 CM^2
BH CV ECHO MEAS - AVA(I,D): 3.4 CM^2
BH CV ECHO MEAS - AVA(V,A): 3 CM^2
BH CV ECHO MEAS - AVA(V,D): 3 CM^2
BH CV ECHO MEAS - BSA(HAYCOCK): 2.4 M^2
BH CV ECHO MEAS - BSA: 2.3 M^2
BH CV ECHO MEAS - BZI_BMI: 36.2 KILOGRAMS/M^2
BH CV ECHO MEAS - BZI_METRIC_HEIGHT: 177.8 CM
BH CV ECHO MEAS - BZI_METRIC_WEIGHT: 114.3 KG
BH CV ECHO MEAS - CI(AO): 9 L/MIN/M^2
BH CV ECHO MEAS - CI(LVOT): 3.7 L/MIN/M^2
BH CV ECHO MEAS - CO(AO): 20.8 L/MIN
BH CV ECHO MEAS - CO(LVOT): 8.5 L/MIN
BH CV ECHO MEAS - EDV(CUBED): 147.9 ML
BH CV ECHO MEAS - EDV(MOD-SP2): 102.1 ML
BH CV ECHO MEAS - EDV(MOD-SP4): 136.1 ML
BH CV ECHO MEAS - EDV(TEICH): 134.7 ML
BH CV ECHO MEAS - EF(CUBED): 58.2 %
BH CV ECHO MEAS - EF(MOD-BP): 65 %
BH CV ECHO MEAS - EF(MOD-SP2): 70.5 %
BH CV ECHO MEAS - EF(MOD-SP4): 65.5 %
BH CV ECHO MEAS - EF(TEICH): 49.4 %
BH CV ECHO MEAS - ESV(CUBED): 61.8 ML
BH CV ECHO MEAS - ESV(MOD-SP2): 30.1 ML
BH CV ECHO MEAS - ESV(MOD-SP4): 46.9 ML
BH CV ECHO MEAS - ESV(TEICH): 68.1 ML
BH CV ECHO MEAS - FS: 25.2 %
BH CV ECHO MEAS - IVS/LVPW: 1.3
BH CV ECHO MEAS - IVSD: 1.4 CM
BH CV ECHO MEAS - LA DIMENSION(2D): 2.9 CM
BH CV ECHO MEAS - LA DIMENSION: 3.2 CM
BH CV ECHO MEAS - LA/AO: 0.98
BH CV ECHO MEAS - LV DIASTOLIC VOL/BSA (35-75): 59.1 ML/M^2
BH CV ECHO MEAS - LV MASS(C)D: 271 GRAMS
BH CV ECHO MEAS - LV MASS(C)DI: 117.7 GRAMS/M^2
BH CV ECHO MEAS - LV MAX PG: 4.3 MMHG
BH CV ECHO MEAS - LV MEAN PG: 3 MMHG
BH CV ECHO MEAS - LV SYSTOLIC VOL/BSA (12-30): 20.4 ML/M^2
BH CV ECHO MEAS - LV V1 MAX: 104.1 CM/SEC
BH CV ECHO MEAS - LV V1 MEAN: 80.6 CM/SEC
BH CV ECHO MEAS - LV V1 VTI: 20.9 CM
BH CV ECHO MEAS - LVIDD: 5.3 CM
BH CV ECHO MEAS - LVIDS: 4 CM
BH CV ECHO MEAS - LVOT AREA: 4.6 CM^2
BH CV ECHO MEAS - LVOT DIAM: 2.4 CM
BH CV ECHO MEAS - LVPWD: 1.1 CM
BH CV ECHO MEAS - MV A MAX VEL: 90.3 CM/SEC
BH CV ECHO MEAS - MV DEC SLOPE: 585.5 CM/SEC^2
BH CV ECHO MEAS - MV DEC TIME: 0.16 SEC
BH CV ECHO MEAS - MV E MAX VEL: 92.8 CM/SEC
BH CV ECHO MEAS - MV E/A: 1
BH CV ECHO MEAS - MV MAX PG: 3.5 MMHG
BH CV ECHO MEAS - MV MEAN PG: 2.5 MMHG
BH CV ECHO MEAS - MV V2 MAX: 93.5 CM/SEC
BH CV ECHO MEAS - MV V2 MEAN: 78.4 CM/SEC
BH CV ECHO MEAS - MV V2 VTI: 22.1 CM
BH CV ECHO MEAS - MVA(VTI): 4.4 CM^2
BH CV ECHO MEAS - PA ACC TIME: 0.11 SEC
BH CV ECHO MEAS - PA MAX PG (FULL): 2.8 MMHG
BH CV ECHO MEAS - PA MAX PG: 5.3 MMHG
BH CV ECHO MEAS - PA MEAN PG (FULL): 1.9 MMHG
BH CV ECHO MEAS - PA MEAN PG: 3.2 MMHG
BH CV ECHO MEAS - PA PR(ACCEL): 31 MMHG
BH CV ECHO MEAS - PA V2 MAX: 115.5 CM/SEC
BH CV ECHO MEAS - PA V2 MEAN: 84.6 CM/SEC
BH CV ECHO MEAS - PA V2 VTI: 20.3 CM
BH CV ECHO MEAS - PI END-D VEL: 121.7 CM/SEC
BH CV ECHO MEAS - PI MAX PG: 13.2 MMHG
BH CV ECHO MEAS - PI MAX VEL: 181.8 CM/SEC
BH CV ECHO MEAS - PULM A REVS DUR: 0.16 SEC
BH CV ECHO MEAS - PULM A REVS VEL: 25.5 CM/SEC
BH CV ECHO MEAS - PULM DIAS VEL: 40.4 CM/SEC
BH CV ECHO MEAS - PULM S/D: 1.3
BH CV ECHO MEAS - PULM SYS VEL: 53.8 CM/SEC
BH CV ECHO MEAS - RAP SYSTOLE: 3 MMHG
BH CV ECHO MEAS - RV MAX PG: 2.5 MMHG
BH CV ECHO MEAS - RV MEAN PG: 1.3 MMHG
BH CV ECHO MEAS - RV V1 MAX: 78.9 CM/SEC
BH CV ECHO MEAS - RV V1 MEAN: 52.7 CM/SEC
BH CV ECHO MEAS - RV V1 VTI: 14.4 CM
BH CV ECHO MEAS - RVDD: 3.5 CM
BH CV ECHO MEAS - RVSP: 27 MMHG
BH CV ECHO MEAS - SI(AO): 102.1 ML/M^2
BH CV ECHO MEAS - SI(CUBED): 37.4 ML/M^2
BH CV ECHO MEAS - SI(LVOT): 41.9 ML/M^2
BH CV ECHO MEAS - SI(MOD-SP2): 31.3 ML/M^2
BH CV ECHO MEAS - SI(MOD-SP4): 38.7 ML/M^2
BH CV ECHO MEAS - SI(TEICH): 28.9 ML/M^2
BH CV ECHO MEAS - SV(AO): 235.1 ML
BH CV ECHO MEAS - SV(CUBED): 86.1 ML
BH CV ECHO MEAS - SV(LVOT): 96.4 ML
BH CV ECHO MEAS - SV(MOD-SP2): 72 ML
BH CV ECHO MEAS - SV(MOD-SP4): 89.1 ML
BH CV ECHO MEAS - SV(TEICH): 66.6 ML
BH CV ECHO MEAS - TR MAX VEL: 243.1 CM/SEC
BH CV NUCLEAR PRIOR STUDY: 3
BH CV STRESS BP STAGE 1: NORMAL
BH CV STRESS COMMENTS STAGE 1: NORMAL
BH CV STRESS DOSE REGADENOSON STAGE 1: 0.4
BH CV STRESS DURATION MIN STAGE 1: 0
BH CV STRESS DURATION SEC STAGE 1: 10
BH CV STRESS HR STAGE 1: 87
BH CV STRESS PROTOCOL 1: NORMAL
BH CV STRESS RECOVERY BP: NORMAL MMHG
BH CV STRESS RECOVERY HR: 100 BPM
BH CV STRESS STAGE 1: 1
BILIRUB SERPL-MCNC: 0.7 MG/DL (ref 0–1.2)
BUN SERPL-MCNC: 16 MG/DL (ref 8–23)
BUN/CREAT SERPL: 20 (ref 7–25)
CALCIUM SPEC-SCNC: 8.6 MG/DL (ref 8.6–10.5)
CHLORIDE SERPL-SCNC: 102 MMOL/L (ref 98–107)
CHOLEST SERPL-MCNC: 142 MG/DL (ref 0–200)
CO2 SERPL-SCNC: 29 MMOL/L (ref 22–29)
CREAT SERPL-MCNC: 0.8 MG/DL (ref 0.76–1.27)
CRP SERPL-MCNC: 9.05 MG/DL (ref 0–0.5)
DEPRECATED RDW RBC AUTO: 43.8 FL (ref 37–54)
EOSINOPHIL # BLD AUTO: 0.1 10*3/MM3 (ref 0–0.4)
EOSINOPHIL NFR BLD AUTO: 2 % (ref 0.3–6.2)
ERYTHROCYTE [DISTWIDTH] IN BLOOD BY AUTOMATED COUNT: 14.5 % (ref 12.3–15.4)
GFR SERPL CREATININE-BSD FRML MDRD: 98 ML/MIN/1.73
GLOBULIN UR ELPH-MCNC: 2.3 GM/DL
GLUCOSE SERPL-MCNC: 129 MG/DL (ref 65–99)
HCT VFR BLD AUTO: 34.8 % (ref 37.5–51)
HDLC SERPL-MCNC: 37 MG/DL (ref 40–60)
HGB BLD-MCNC: 12.1 G/DL (ref 13–17.7)
LDLC SERPL CALC-MCNC: 83 MG/DL (ref 0–100)
LDLC/HDLC SERPL: 2.17 {RATIO}
LV EF 2D ECHO EST: 60 %
LYMPHOCYTES # BLD AUTO: 0.9 10*3/MM3 (ref 0.7–3.1)
LYMPHOCYTES NFR BLD AUTO: 14.8 % (ref 19.6–45.3)
MAXIMAL PREDICTED HEART RATE: 159 BPM
MCH RBC QN AUTO: 30.4 PG (ref 26.6–33)
MCHC RBC AUTO-ENTMCNC: 34.9 G/DL (ref 31.5–35.7)
MCV RBC AUTO: 87 FL (ref 79–97)
MONOCYTES # BLD AUTO: 0.6 10*3/MM3 (ref 0.1–0.9)
MONOCYTES NFR BLD AUTO: 10.6 % (ref 5–12)
NEUTROPHILS NFR BLD AUTO: 4.4 10*3/MM3 (ref 1.7–7)
NEUTROPHILS NFR BLD AUTO: 72.1 % (ref 42.7–76)
NRBC BLD AUTO-RTO: 0.1 /100 WBC (ref 0–0.2)
NT-PROBNP SERPL-MCNC: 149 PG/ML (ref 0–900)
PERCENT MAX PREDICTED HR: 66.04 %
PLATELET # BLD AUTO: 156 10*3/MM3 (ref 140–450)
PMV BLD AUTO: 8.1 FL (ref 6–12)
POTASSIUM SERPL-SCNC: 3.7 MMOL/L (ref 3.5–5.2)
PROT SERPL-MCNC: 6 G/DL (ref 6–8.5)
QT INTERVAL: 450 MS
RBC # BLD AUTO: 4 10*6/MM3 (ref 4.14–5.8)
SODIUM SERPL-SCNC: 137 MMOL/L (ref 136–145)
STRESS BASELINE BP: NORMAL MMHG
STRESS BASELINE HR: 91 BPM
STRESS PERCENT HR: 78 %
STRESS POST PEAK BP: NORMAL MMHG
STRESS POST PEAK HR: 105 BPM
STRESS TARGET HR: 135 BPM
TRIGL SERPL-MCNC: 123 MG/DL (ref 0–150)
TSH SERPL DL<=0.05 MIU/L-ACNC: 0.4 UIU/ML (ref 0.27–4.2)
VLDLC SERPL-MCNC: 22 MG/DL (ref 5–40)
WBC # BLD AUTO: 6.1 10*3/MM3 (ref 3.4–10.8)

## 2021-01-03 PROCEDURE — 93016 CV STRESS TEST SUPVJ ONLY: CPT | Performed by: INTERNAL MEDICINE

## 2021-01-03 PROCEDURE — 84443 ASSAY THYROID STIM HORMONE: CPT | Performed by: INTERNAL MEDICINE

## 2021-01-03 PROCEDURE — G0378 HOSPITAL OBSERVATION PER HR: HCPCS

## 2021-01-03 PROCEDURE — 93306 TTE W/DOPPLER COMPLETE: CPT

## 2021-01-03 PROCEDURE — 96372 THER/PROPH/DIAG INJ SC/IM: CPT

## 2021-01-03 PROCEDURE — 94799 UNLISTED PULMONARY SVC/PX: CPT

## 2021-01-03 PROCEDURE — 25010000002 ENOXAPARIN PER 10 MG: Performed by: INTERNAL MEDICINE

## 2021-01-03 PROCEDURE — 78452 HT MUSCLE IMAGE SPECT MULT: CPT | Performed by: INTERNAL MEDICINE

## 2021-01-03 PROCEDURE — 99214 OFFICE O/P EST MOD 30 MIN: CPT | Performed by: INTERNAL MEDICINE

## 2021-01-03 PROCEDURE — 78452 HT MUSCLE IMAGE SPECT MULT: CPT

## 2021-01-03 PROCEDURE — 25010000003 AMPICILLIN-SULBACTAM PER 1.5 G: Performed by: INTERNAL MEDICINE

## 2021-01-03 PROCEDURE — 85025 COMPLETE CBC W/AUTO DIFF WBC: CPT | Performed by: INTERNAL MEDICINE

## 2021-01-03 PROCEDURE — 83880 ASSAY OF NATRIURETIC PEPTIDE: CPT | Performed by: INTERNAL MEDICINE

## 2021-01-03 PROCEDURE — 80053 COMPREHEN METABOLIC PANEL: CPT | Performed by: INTERNAL MEDICINE

## 2021-01-03 PROCEDURE — 93017 CV STRESS TEST TRACING ONLY: CPT

## 2021-01-03 PROCEDURE — 93018 CV STRESS TEST I&R ONLY: CPT | Performed by: INTERNAL MEDICINE

## 2021-01-03 PROCEDURE — 25010000002 REGADENOSON 0.4 MG/5ML SOLUTION: Performed by: INTERNAL MEDICINE

## 2021-01-03 PROCEDURE — 96365 THER/PROPH/DIAG IV INF INIT: CPT

## 2021-01-03 PROCEDURE — 93306 TTE W/DOPPLER COMPLETE: CPT | Performed by: INTERNAL MEDICINE

## 2021-01-03 PROCEDURE — 0 TECHNETIUM SESTAMIBI: Performed by: INTERNAL MEDICINE

## 2021-01-03 PROCEDURE — 80061 LIPID PANEL: CPT | Performed by: INTERNAL MEDICINE

## 2021-01-03 PROCEDURE — 86140 C-REACTIVE PROTEIN: CPT | Performed by: INTERNAL MEDICINE

## 2021-01-03 PROCEDURE — 99225 PR SBSQ OBSERVATION CARE/DAY 25 MINUTES: CPT | Performed by: INTERNAL MEDICINE

## 2021-01-03 PROCEDURE — A9500 TC99M SESTAMIBI: HCPCS | Performed by: INTERNAL MEDICINE

## 2021-01-03 RX ADMIN — TECHNETIUM TC 99M SESTAMIBI 1 DOSE: 1 INJECTION INTRAVENOUS at 07:45

## 2021-01-03 RX ADMIN — SODIUM CHLORIDE 1.5 G: 900 INJECTION INTRAVENOUS at 18:26

## 2021-01-03 RX ADMIN — MONTELUKAST 10 MG: 10 TABLET, FILM COATED ORAL at 21:19

## 2021-01-03 RX ADMIN — Medication 10 ML: at 21:19

## 2021-01-03 RX ADMIN — ALLOPURINOL 300 MG: 300 TABLET ORAL at 08:28

## 2021-01-03 RX ADMIN — REGADENOSON 0.4 MG: 0.08 INJECTION, SOLUTION INTRAVENOUS at 09:58

## 2021-01-03 RX ADMIN — Medication 10 ML: at 08:28

## 2021-01-03 RX ADMIN — PANTOPRAZOLE SODIUM 40 MG: 40 TABLET, DELAYED RELEASE ORAL at 08:28

## 2021-01-03 RX ADMIN — ENOXAPARIN SODIUM 40 MG: 40 INJECTION SUBCUTANEOUS at 18:27

## 2021-01-03 RX ADMIN — ASPIRIN 81 MG: 81 TABLET, COATED ORAL at 08:28

## 2021-01-03 RX ADMIN — TECHNETIUM TC 99M SESTAMIBI 1 DOSE: 1 INJECTION INTRAVENOUS at 09:58

## 2021-01-03 NOTE — PLAN OF CARE
Problem: Adult Inpatient Plan of Care  Goal: Plan of Care Review  Outcome: Ongoing, Progressing  Goal: Patient-Specific Goal (Individualized)  Outcome: Ongoing, Progressing  Goal: Absence of Hospital-Acquired Illness or Injury  Outcome: Ongoing, Progressing  Intervention: Identify and Manage Fall Risk  Recent Flowsheet Documentation  Taken 1/3/2021 0030 by Fannie Sharpe RN  Safety Promotion/Fall Prevention:   safety round/check completed   fall prevention program maintained   elopement precautions   assistive device/personal items within reach   clutter free environment maintained   activity supervised  Taken 1/2/2021 2030 by Fannie Sharpe RN  Safety Promotion/Fall Prevention:   safety round/check completed   fall prevention program maintained   clutter free environment maintained   assistive device/personal items within reach   activity supervised  Intervention: Prevent Skin Injury  Recent Flowsheet Documentation  Taken 1/3/2021 0030 by Fannie Sharpe RN  Body Position: position changed independently  Taken 1/2/2021 2030 by Fannie Sharpe RN  Body Position: position changed independently  Goal: Optimal Comfort and Wellbeing  Outcome: Ongoing, Progressing  Goal: Readiness for Transition of Care  Outcome: Ongoing, Progressing     Problem: Chest Pain  Goal: Resolution of Chest Pain Symptoms  Outcome: Ongoing, Progressing     Problem: Fluid Imbalance (Pneumonia)  Goal: Fluid Balance  Outcome: Ongoing, Progressing     Problem: Infection (Pneumonia)  Goal: Resolution of Infection Signs and Symptoms  Outcome: Ongoing, Progressing     Problem: Respiratory Compromise (Pneumonia)  Goal: Effective Oxygenation and Ventilation  Outcome: Ongoing, Progressing   Goal Outcome Evaluation:

## 2021-01-03 NOTE — CONSULTS
[facility]: [facility]         CONSULT NOTE    Patient Identification:  Nile Chin  60 y.o.  male  1960  [id number]            Requesting physician: Attending physician    Reason for Consultation: Dyspnea      History of Present Illness:     60 y.o. male with past medical history of  Pyloric stenosis (surgery as child), GERD, hypertension, thoracic aortic aneurysm, DVT, melanoma presented to the ED with chest pain and taking a deep breath.  Patient reports he was his normal self last evening; he walked his dog around 10pm, woke up around 2 am fine then at 5am woke up diaphoretic and unable to breath.  Then reports chest pain with deep breath.  Patient denies any lower extremity edema, he does have a history of DVT no longer anticoagulated.   Non smoker,   CT chest was negative for PE but did show possible pneumonia.    COVID negative      Assessment:  Possible right lower lobe pneumonia rule out aspiration  No evidence of pulmonary embolism on CT scan on admission  Past medical history of GERD, pyloric stenosis status post surgery as a child  Thoracic aortic aneurysm  DVT currently not on anticoagulation  Melanoma  Hypertension    COVID-19 test and viral panel negative on 1/2/2021    Recommendations:    Antibiotics start low-dose Unasyn    Monitor respiratory status for possible bronc            Review of Sytems:  Review of Systems   Respiratory: Positive for cough and shortness of breath.        Past Medical History:  Past Medical History:   Diagnosis Date   • Allergies    • GERD (gastroesophageal reflux disease)    • Gout    • Hypertension    • Melanoma (CMS/HCC)    • Pyloric stenosis        Past Surgical History:  Past Surgical History:   Procedure Laterality Date   • APPENDECTOMY          Home Meds:  Medications Prior to Admission   Medication Sig Dispense Refill Last Dose   • allopurinol (ZYLOPRIM) 300 MG tablet Take 300 mg by mouth Daily.   1/1/2021 at Unknown time   • fluticasone (FLONASE) 50  "MCG/ACT nasal spray 1 spray into the nostril(s) as directed by provider As Needed.   1/1/2021 at Unknown time   • lisinopril (PRINIVIL,ZESTRIL) 40 MG tablet Take 40 mg by mouth Daily.   1/1/2021 at Unknown time   • montelukast (SINGULAIR) 10 MG tablet Take 10 mg by mouth Every Night.   1/1/2021 at Unknown time   • omeprazole (priLOSEC) 40 MG capsule Take 40 mg by mouth Daily.   1/1/2021 at Unknown time       Allergies:  Allergies   Allergen Reactions   • Sulfa Antibiotics Rash   • Suture Rash     Cat gut suture       Social History:   Social History     Socioeconomic History   • Marital status:      Spouse name: Not on file   • Number of children: Not on file   • Years of education: Not on file   • Highest education level: Not on file   Tobacco Use   • Smoking status: Never Smoker   Substance and Sexual Activity   • Alcohol use: No     Frequency: Never   • Drug use: No   • Sexual activity: Defer       Family History:  History reviewed. No pertinent family history.    Physical Exam:  /90   Pulse 100   Temp 98.5 °F (36.9 °C)   Resp 20   Ht 177.8 cm (70\")   Wt 114 kg (252 lb)   SpO2 95%   BMI 36.16 kg/m²  Body mass index is 36.16 kg/m². 95% 114 kg (252 lb)  Physical Exam  Pulmonary:      Breath sounds: Rhonchi and rales present.         LABS:  Lab Results   Component Value Date    CALCIUM 8.6 01/03/2021     Results from last 7 days   Lab Units 01/03/21  0324 01/02/21  0627   SODIUM mmol/L 137 140   POTASSIUM mmol/L 3.7 3.7   CHLORIDE mmol/L 102 99   CO2 mmol/L 29.0 30.0*   BUN mg/dL 16 17   CREATININE mg/dL 0.80 0.89   GLUCOSE mg/dL 129* 126*   CALCIUM mg/dL 8.6 9.8   WBC 10*3/mm3 6.10 7.70   HEMOGLOBIN g/dL 12.1* 13.6   PLATELETS 10*3/mm3 156 196   ALT (SGPT) U/L 17 24   AST (SGOT) U/L 17 22   PROBNP pg/mL  --  33.5   PROCALCITONIN ng/mL  --  0.05     Lab Results   Component Value Date    TROPONINT <0.010 01/02/2021     Results from last 7 days   Lab Units 01/02/21 2018 01/02/21  1442 " 01/02/21  0627   TROPONIN T ng/mL <0.010 <0.010 <0.010     Results from last 7 days   Lab Units 01/02/21  0853   BLOODCX  No growth at 24 hours  No growth at 24 hours     Results from last 7 days   Lab Units 01/02/21  0627   PROCALCITONIN ng/mL 0.05         Results from last 7 days   Lab Units 01/02/21  0857   ADENOVIRUS DETECTION BY PCR  Not Detected   CORONAVIRUS 229E  Not Detected   CORONAVIRUS HKU1  Not Detected   CORONAVIRUS NL63  Not Detected   CORONAVIRUS OC43  Not Detected   HUMAN METAPNEUMOVIRUS  Not Detected   HUMAN RHINOVIRUS/ENTEROVIRUS  Not Detected   INFLUENZA B PCR  Not Detected   PARAINFLUENZA 1  Not Detected   PARAINFLUENZA VIRUS 2  Not Detected   PARAINFLUENZA VIRUS 3  Not Detected   PARAINFLUENZA VIRUS 4  Not Detected   BORDETELLA PERTUSSIS PCR  Not Detected   CHLAMYDOPHILA PNEUMONIAE PCR  Not Detected   MYCOPLAMA PNEUMO PCR  Not Detected   INFLUENZA A PCR  Not Detected   RSV, PCR  Not Detected     Results from last 7 days   Lab Units 01/02/21  0627   INR  0.93     Results from last 7 days   Lab Units 01/02/21  0853   BLOODCX  No growth at 24 hours  No growth at 24 hours     Lab Results   Component Value Date    TSH 0.403 01/03/2021     Estimated Creatinine Clearance: 122.6 mL/min (by C-G formula based on SCr of 0.8 mg/dL).  Results from last 7 days   Lab Units 01/02/21  1033   NITRITE UA  Negative        Imaging:  Imaging Results (Last 24 Hours)     ** No results found for the last 24 hours. **            Current Meds:   SCHEDULE  allopurinol, 300 mg, Oral, Daily  aspirin, 81 mg, Oral, Daily  enoxaparin, 40 mg, Subcutaneous, Q24H  montelukast, 10 mg, Oral, Nightly  pantoprazole, 40 mg, Oral, QAM  sodium chloride, 10 mL, Intravenous, Q12H      Infusions  Pharmacy to Dose enoxaparin (LOVENOX),       PRNs  •  acetaminophen **OR** acetaminophen **OR** acetaminophen  •  aluminum-magnesium hydroxide-simethicone  •  bisacodyl  •  bisacodyl  •  calcium carbonate  •  magnesium hydroxide  •  melatonin  •   nitroglycerin  •  ondansetron **OR** ondansetron  •  Pharmacy to Dose enoxaparin (LOVENOX)  •  [COMPLETED] Insert peripheral IV **AND** sodium chloride  •  sodium chloride        Marlen Blanchard MD  1/3/2021  16:16 EST      Much of this encounter note is an electronic transcription/translation of spoken language to printed text using Dragon Software.

## 2021-01-03 NOTE — PROGRESS NOTES
Referring Provider: Sydnee Wan MD    Reason for follow-up:  Chest pain     Patient Care Team:  Job Santana MD as PCP - General (Internal Medicine)    Subjective .  Feeling okay     ROS    Since I have last seen him yesterday, the patient has been without any chest discomfort ,shortness of breath, palpitations, dizziness or syncope.  Denies having any headache ,abdominal pain ,nausea, vomiting , diarrhea constipation, loss of weight or loss of appetite.  Denies having any excessive bruising ,hematuria or blood in the stool.    Review of all systems negative except as indicated    History  Past Medical History:   Diagnosis Date   • Allergies    • GERD (gastroesophageal reflux disease)    • Gout    • Hypertension    • Melanoma (CMS/HCC)    • Pyloric stenosis        Past Surgical History:   Procedure Laterality Date   • APPENDECTOMY         History reviewed. No pertinent family history.    Social History     Tobacco Use   • Smoking status: Never Smoker   Substance Use Topics   • Alcohol use: No     Frequency: Never   • Drug use: No        Medications Prior to Admission   Medication Sig Dispense Refill Last Dose   • allopurinol (ZYLOPRIM) 300 MG tablet Take 300 mg by mouth Daily.   1/1/2021 at Unknown time   • fluticasone (FLONASE) 50 MCG/ACT nasal spray 1 spray into the nostril(s) as directed by provider As Needed.   1/1/2021 at Unknown time   • lisinopril (PRINIVIL,ZESTRIL) 40 MG tablet Take 40 mg by mouth Daily.   1/1/2021 at Unknown time   • montelukast (SINGULAIR) 10 MG tablet Take 10 mg by mouth Every Night.   1/1/2021 at Unknown time   • omeprazole (priLOSEC) 40 MG capsule Take 40 mg by mouth Daily.   1/1/2021 at Unknown time       Allergies  Sulfa antibiotics and Suture    Scheduled Meds:allopurinol, 300 mg, Oral, Daily  aspirin, 81 mg, Oral, Daily  enoxaparin, 40 mg, Subcutaneous, Q24H  montelukast, 10 mg, Oral, Nightly  pantoprazole, 40 mg, Oral, QAM  sodium chloride, 10 mL, Intravenous,  "Q12H      Continuous Infusions:Pharmacy to Dose enoxaparin (LOVENOX),       PRN Meds:.•  acetaminophen **OR** acetaminophen **OR** acetaminophen  •  aluminum-magnesium hydroxide-simethicone  •  bisacodyl  •  bisacodyl  •  calcium carbonate  •  magnesium hydroxide  •  melatonin  •  nitroglycerin  •  ondansetron **OR** ondansetron  •  Pharmacy to Dose enoxaparin (LOVENOX)  •  [COMPLETED] Insert peripheral IV **AND** sodium chloride  •  sodium chloride    Objective     VITAL SIGNS  Vitals:    01/02/21 2238 01/03/21 0227 01/03/21 0614 01/03/21 0615   BP: 114/70 105/70  127/80   BP Location: Left arm Left arm     Patient Position: Lying Lying     Pulse: 85 80  79   Resp: 15 17 20    Temp: 99.1 °F (37.3 °C) 98.6 °F (37 °C) 98.7 °F (37.1 °C)    TempSrc: Oral Oral Oral    SpO2: 95% 94%  95%   Weight:   114 kg (252 lb 3.3 oz)    Height:           Flowsheet Rows      First Filed Value   Admission Height  180.3 cm (71\") Documented at 01/02/2021 0558   Admission Weight  114 kg (251 lb) Documented at 01/02/2021 0558          No intake or output data in the 24 hours ending 01/03/21 0745     TELEMETRY: Sinus rhythm    Physical Exam:  The patient is alert, oriented and in no distress.  Vital signs as noted above.  Head and neck revealed no carotid bruits or jugular venous distention.  No thyromegaly or lymphadenopathy is present  Lungs clear.  No wheezing.  Breath sounds are normal bilaterally.  Heart normal first and second heart sounds.  No murmur. No precordial rub is present.  No gallop is present.  Abdomen soft and nontender.  No organomegaly is present.  Extremities with good peripheral pulses without any pedal edema.  Skin warm and dry.  Musculoskeletal system is grossly normal  CNS grossly normal      Results Review:   I reviewed the patient's new clinical results.  Lab Results (last 24 hours)     Procedure Component Value Units Date/Time    Lipid Panel [[id number]]  (Abnormal) Collected: 01/03/21 0324    Specimen: Blood " Updated: 01/03/21 0429     Total Cholesterol 142 mg/dL      Triglycerides 123 mg/dL      HDL Cholesterol 37 mg/dL      LDL Cholesterol  83 mg/dL      VLDL Cholesterol 22 mg/dL      LDL/HDL Ratio 2.17    Narrative:      Cholesterol Reference Ranges  (U.S. Department of Health and Human Services ATP III Classifications)    Desirable          <200 mg/dL  Borderline High    200-239 mg/dL  High Risk          >240 mg/dL      Triglyceride Reference Ranges  (U.S. Department of Health and Human Services ATP III Classifications)    Normal           <150 mg/dL  Borderline High  150-199 mg/dL  High             200-499 mg/dL  Very High        >500 mg/dL    HDL Reference Ranges  (U.S. Department of Health and Human Services ATP III Classifcations)    Low     <40 mg/dl (major risk factor for CHD)  High    >60 mg/dl ('negative' risk factor for CHD)        LDL Reference Ranges  (U.S. Department of Health and Human Services ATP III Classifcations)    Optimal          <100 mg/dL  Near Optimal     100-129 mg/dL  Borderline High  130-159 mg/dL  High             160-189 mg/dL  Very High        >189 mg/dL    Comprehensive Metabolic Panel [[id number]]  (Abnormal) Collected: 01/03/21 0324    Specimen: Blood Updated: 01/03/21 0429     Glucose 129 mg/dL      BUN 16 mg/dL      Creatinine 0.80 mg/dL      Sodium 137 mmol/L      Potassium 3.7 mmol/L      Chloride 102 mmol/L      CO2 29.0 mmol/L      Calcium 8.6 mg/dL      Total Protein 6.0 g/dL      Albumin 3.70 g/dL      ALT (SGPT) 17 U/L      AST (SGOT) 17 U/L      Alkaline Phosphatase 73 U/L      Total Bilirubin 0.7 mg/dL      eGFR Non African Amer 98 mL/min/1.73      Globulin 2.3 gm/dL      A/G Ratio 1.6 g/dL      BUN/Creatinine Ratio 20.0     Anion Gap 6.0 mmol/L     Narrative:      GFR Normal >60  Chronic Kidney Disease <60  Kidney Failure <15      TSH [[id number]]  (Normal) Collected: 01/03/21 0324    Specimen: Blood Updated: 01/03/21 0429     TSH 0.403 uIU/mL     CBC Auto Differential  [[id number]]  (Abnormal) Collected: 01/03/21 0324    Specimen: Blood Updated: 01/03/21 0356     WBC 6.10 10*3/mm3      RBC 4.00 10*6/mm3      Hemoglobin 12.1 g/dL      Hematocrit 34.8 %      MCV 87.0 fL      MCH 30.4 pg      MCHC 34.9 g/dL      RDW 14.5 %      RDW-SD 43.8 fl      MPV 8.1 fL      Platelets 156 10*3/mm3      Neutrophil % 72.1 %      Lymphocyte % 14.8 %      Monocyte % 10.6 %      Eosinophil % 2.0 %      Basophil % 0.5 %      Neutrophils, Absolute 4.40 10*3/mm3      Lymphocytes, Absolute 0.90 10*3/mm3      Monocytes, Absolute 0.60 10*3/mm3      Eosinophils, Absolute 0.10 10*3/mm3      Basophils, Absolute 0.00 10*3/mm3      nRBC 0.1 /100 WBC     Troponin [[id number]]  (Normal) Collected: 01/02/21 2018    Specimen: Blood Updated: 01/02/21 2047     Troponin T <0.010 ng/mL     Narrative:      Troponin T Reference Range:  <= 0.03 ng/mL-   Negative for AMI  >0.03 ng/mL-     Abnormal for myocardial necrosis.  Clinicians would have to utilize clinical acumen, EKG, Troponin and serial changes to determine if it is an Acute Myocardial Infarction or myocardial injury due to an underlying chronic condition.       Results may be falsely decreased if patient taking Biotin.      D-dimer, Quantitative [[id number]]  (Abnormal) Collected: 01/02/21 1742    Specimen: Blood Updated: 01/02/21 1838     D-Dimer, Quantitative 1.66 mg/L (FEU)     Narrative:      Reference Range  --------------------------------------------------------------------     < 0.50   Negative Predictive Value  0.50-0.59   Indeterminate    >= 0.60   Probable VTE             A very low percentage of patients with DVT may yield D-Dimer results   below the cut-off of 0.50 mg/L FEU.  This is known to be more   prevalent in patients with distal DVT.             Results of this test should always be interpreted in conjunction with   the patient's medical history, clinical presentation and other   findings.  Clinical diagnosis should not be based on the result of    [facility] D-Dimer alone.    Troponin [[id number]]  (Normal) Collected: 01/02/21 1442    Specimen: Blood Updated: 01/02/21 1516     Troponin T <0.010 ng/mL     Narrative:      Troponin T Reference Range:  <= 0.03 ng/mL-   Negative for AMI  >0.03 ng/mL-     Abnormal for myocardial necrosis.  Clinicians would have to utilize clinical acumen, EKG, Troponin and serial changes to determine if it is an Acute Myocardial Infarction or myocardial injury due to an underlying chronic condition.       Results may be falsely decreased if patient taking Biotin.      Ferritin [[id number]]  (Normal) Collected: 01/02/21 0555    Specimen: Blood Updated: 01/02/21 1232     Ferritin 302.00 ng/mL     Narrative:      Results may be falsely decreased if patient taking Biotin.      S. Pneumo Ag Urine or CSF - Urine, Urine, Clean Catch [[id number]]  (Normal) Collected: 01/02/21 1033    Specimen: Urine, Clean Catch Updated: 01/02/21 1112     Strep Pneumo Ag Negative    Legionella Antigen, Urine - Urine, Urine, Clean Catch [[id number]]  (Normal) Collected: 01/02/21 1033    Specimen: Urine, Clean Catch Updated: 01/02/21 1112     LEGIONELLA ANTIGEN, URINE Negative    Urinalysis With Culture If Indicated - Urine, Clean Catch [[id number]]  (Normal) Collected: 01/02/21 1033    Specimen: Urine, Clean Catch Updated: 01/02/21 1046     Color, UA Yellow     Appearance, UA Clear     pH, UA 7.0     Specific Gravity, UA 1.029     Glucose, UA Negative     Ketones, UA Negative     Bilirubin, UA Negative     Blood, UA Negative     Protein, UA Negative     Leuk Esterase, UA Negative     Nitrite, UA Negative     Urobilinogen, UA 0.2 E.U./dL    Narrative:      Urine microscopic not indicated.    Respiratory Panel PCR w/COVID-19(SARS-CoV-2) HERNAN/ROSA/SHAMEKA/PAD/COR/MAD/DORIAN In-House, NP Swab in [facility], 3-4 HR TAT - Swab, Nasopharynx [[id number]]  (Normal) Collected: 01/02/21 0857    Specimen: Swab from Nasopharynx Updated: 01/02/21 1007     ADENOVIRUS, PCR Not Detected      Coronavirus 229E Not Detected     Coronavirus HKU1 Not Detected     Coronavirus NL63 Not Detected     Coronavirus OC43 Not Detected     COVID19 Not Detected     Human Metapneumovirus Not Detected     Human Rhinovirus/Enterovirus Not Detected     Influenza A PCR Not Detected     Influenza B PCR Not Detected     Parainfluenza Virus 1 Not Detected     Parainfluenza Virus 2 Not Detected     Parainfluenza Virus 3 Not Detected     Parainfluenza Virus 4 Not Detected     RSV, PCR Not Detected     Bordetella pertussis pcr Not Detected     Bordetella parapertussis PCR Not Detected     Chlamydophila pneumoniae PCR Not Detected     Mycoplasma pneumo by PCR Not Detected    Narrative:      Fact sheet for providers: https://docs.[employer]/wp-content/uploads/TEZ4177-0533-NC0.1-EUA-Provider-Fact-Sheet-3.pdf    Fact sheet for patients: https://docs.[employer]/wp-content/uploads/XFI5775-0187-KY5.1-EUA-Patient-Fact-Sheet-1.pdf    Test performed by PCR.    Lactate Dehydrogenase [[id number]]  (Normal) Collected: 01/02/21 0627    Specimen: Blood Updated: 01/02/21 0932      U/L      Comment: Specimen hemolyzed.  Results may be affected.       C-reactive Protein [[id number]]  (Abnormal) Collected: 01/02/21 0627    Specimen: Blood Updated: 01/02/21 0929     C-Reactive Protein 0.90 mg/dL     Blood Culture - Blood, Arm, Right [[id number]] Collected: 01/02/21 0853    Specimen: Blood from Arm, Right Updated: 01/02/21 0901    Blood Culture - Blood, Arm, Left [[id number]] Collected: 01/02/21 0853    Specimen: Blood from Arm, Left Updated: 01/02/21 0900    Procalcitonin [[id number]]  (Normal) Collected: 01/02/21 0627    Specimen: Blood Updated: 01/02/21 0850     Procalcitonin 0.05 ng/mL     Narrative:      As a Marker for Sepsis (Non-Neonates):   1. <0.5 ng/mL represents a low risk of severe sepsis and/or septic shock.  1. >2 ng/mL represents a high risk of severe sepsis and/or septic shock.    As a Marker for Lower Respiratory Tract  "Infections that require antibiotic therapy:  PCT on Admission     Antibiotic Therapy             6-12 Hrs later  > 0.5                Strongly Recommended            >0.25 - <0.5         Recommended  0.1 - 0.25           Discouraged                   Remeasure/reassess PCT  <0.1                 Strongly Discouraged          Remeasure/reassess PCT      As 28 day mortality risk marker: \"Change in Procalcitonin Result\" (> 80 % or <=80 %) if Day 0 (or Day 1) and Day 4 values are available. Refer to http://www.[employer] TechnologiesO[facility] – Oklahoma CityGenwordspct-calculator.com/   Change in PCT <=80 %   A decrease of PCT levels below or equal to 80 % defines a positive change in PCT test result representing a higher risk for 28-day all-cause mortality of patients diagnosed with severe sepsis or septic shock.  Change in PCT > 80 %   A decrease of PCT levels of more than 80 % defines a negative change in PCT result representing a lower risk for 28-day all-cause mortality of patients diagnosed with severe sepsis or septic shock.                Results may be falsely decreased if patient taking Biotin.     COVID-19, [facility] IN-HOUSE,NASAL Swab (NO TRANSPORT MEDIA) 2 HR TAT - Swab, Nasopharynx [[id number]]  (Normal) Collected: 01/02/21 0700    Specimen: Swab from Nasopharynx Updated: 01/02/21 0753     COVID19 Presumptive Negative    Narrative:      Fact sheet for providers: https://www.fda.gov/media/208738/download     Fact sheet for patients: https://www.fda.gov/media/346087/download    Test performed by PCR.  If inconsistent with clinical signs and symptoms patient should be tested with different authorized molecular test.          Imaging Results (Last 24 Hours)     Procedure Component Value Units Date/Time    CT Abdomen Pelvis With Contrast [[id number]] Collected: 01/02/21 0835     Updated: 01/02/21 0841    Narrative:      CT ABDOMEN PELVIS W CONTRAST-     Date of Exam: 1/2/2021 8:02 AM     Indication: hx aneurysm.        Technique: Multiple axial images were " obtained from the lung bases  through the symphysis pubis after the administration of100 cc [id number]  contrast. The axial data was used to generate reformatted images in the  coronal and sagittal planes.  Automated exposure control and iterative reconstruction methods were  used.        Comparison Exams: 08/21/2018     FINDINGS:  There is patchy bilateral lower lobe airspace disease, right greater  than left favored to be secondary to pneumonia or less likely  atelectasis.  The liver, pancreas, and spleen are within normal limits.   Bilateral adrenal glands appear normal.  There is a nonobstructing stone  within the lower pole of the left kidney.  There is no evidence of  ureteral stone or hydronephrosis.  9 mm low-density mass is again seen  within the medial cortex of the lower pole the right kidney compatible  with a cyst.  There is no abdominal or retroperitoneal adenopathy.   Gallbladder appears normal.  Upper GI tract is within normal limits.   There is a large diverticulum of second portion of the duodenum.   Gallbladder appears normal.  No evidence of biliary tract obstruction.   Abdominal aorta is of normal caliber.  No evidence of aneurysm.     Pelvis: Urinary bladder is within normal limits.  There are few  scattered colonic diverticula.  There is no evidence of diverticulitis.   GI tract is otherwise unremarkable.  Patient appears to be status post  appendectomy.  There is no pelvic or inguinal adenopathy.  There are  small fat-containing bilateral inguinal hernias.  There is no free  intraperitoneal fluid.     There are degenerative changes of the spine.  No lytic or sclerotic bony  lesion identified.       Impression:         1.  Patchy bilateral lower lobe airspace disease, right greater than  left favored to be secondary to pneumonia or less likely atelectasis.  2.  Colonic diverticulosis but no evidence of diverticulitis.  3.  No acute process seen within the abdomen or pelvis.     Electronically  Signed By-Navjot Quijano MD On:1/2/2021 8:39 AM  This report was finalized on [id number] by  Navjot Quijano MD.    CT Chest Pulmonary Embolism [[id number]] Collected: 01/02/21 0831     Updated: 01/02/21 0839    Narrative:      CT CHEST PULMONARY EMBOLISM-     Date of Exam: 1/2/2021 8:02 AM     Indication: dyspnea; hx thoracic aneurysm.     Comparison Exams: 11/11/2020     Technique: Multiple axial images were obtained from the thoracic inlet  through the adrenal glands after the administration of 100cc of Isovue  370 IV contrast. The axial data was used to generate reformatted images  in the coronal and sagittal planes.   Automated exposure control and iterative reconstruction methods were  used.        FINDINGS:  Pulmonary arteries are well-opacified.  There is no evidence pulmonary  embolus.  The thoracic aorta is of normal caliber.  There is no evidence  of aortic dissection.  There is patchy bibasilar lateral lower lobe  airspace disease favored to be secondary to pneumonia or less likely  atelectasis.  There are no pleural effusions.  There is no mediastinal,  hilar, or axillary adenopathy.  Bilateral adrenal glands are within  normal limits.  No lytic or sclerotic bony lesions are identified.       Impression:         1.  No evidence of pulmonary embolus  2.  Patchy bilateral lower lobe airspace disease favored to be secondary  to pneumonia or less likely atelectasis.     Electronically Signed By-Navjot Quijano MD On:1/2/2021 8:34 AM  This report was finalized on [id number] by  Navjot Quijano MD.      LAB RESULTS (LAST 7 DAYS)    CBC  Results from last 7 days   Lab Units 01/03/21  0324 01/02/21 0627   WBC 10*3/mm3 6.10 7.70   RBC 10*6/mm3 4.00* 4.72   HEMOGLOBIN g/dL 12.1* 13.6   HEMATOCRIT % 34.8* 40.3   MCV fL 87.0 85.4   PLATELETS 10*3/mm3 156 196       BMP  Results from last 7 days   Lab Units 01/03/21 0324 01/02/21 0627   SODIUM mmol/L 137 140   POTASSIUM mmol/L 3.7 3.7   CHLORIDE mmol/L 102 99    CO2 mmol/L 29.0 30.0*   BUN mg/dL 16 17   CREATININE mg/dL 0.80 0.89   GLUCOSE mg/dL 129* 126*       CMP   Results from last 7 days   Lab Units 01/03/21  0324 01/02/21  0627   SODIUM mmol/L 137 140   POTASSIUM mmol/L 3.7 3.7   CHLORIDE mmol/L 102 99   CO2 mmol/L 29.0 30.0*   BUN mg/dL 16 17   CREATININE mg/dL 0.80 0.89   GLUCOSE mg/dL 129* 126*   ALBUMIN g/dL 3.70 4.50   BILIRUBIN mg/dL 0.7 0.5   ALK PHOS U/L 73 94   AST (SGOT) U/L 17 22   ALT (SGPT) U/L 17 24         BNP        TROPONIN  Results from last 7 days   Lab Units 01/02/21  2018   TROPONIN T ng/mL <0.010       CoAg  Results from last 7 days   Lab Units 01/02/21 0627   INR  0.93   APTT seconds 24.4       Creatinine Clearance  Estimated Creatinine Clearance: 124.5 mL/min (by C-G formula based on SCr of 0.8 mg/dL).    ABG        Radiology  Ct Abdomen Pelvis With Contrast    Result Date: 1/2/2021   1.  Patchy bilateral lower lobe airspace disease, right greater than left favored to be secondary to pneumonia or less likely atelectasis. 2.  Colonic diverticulosis but no evidence of diverticulitis. 3.  No acute process seen within the abdomen or pelvis.  Electronically Signed By-Navjot Quijano MD On:1/2/2021 8:39 AM This report was finalized on [id number] by  Navjot Quijano MD.    Ct Chest Pulmonary Embolism    Result Date: 1/2/2021   1.  No evidence of pulmonary embolus 2.  Patchy bilateral lower lobe airspace disease favored to be secondary to pneumonia or less likely atelectasis.  Electronically Signed By-Navjot Quijano MD On:1/2/2021 8:34 AM This report was finalized on [id number] by  Navjot Quijano MD.              EKG          I personally viewed and interpreted the patient's EKG/Telemetry data: Sinus rhythm premature atrial contractions    ECHOCARDIOGRAM:             STRESS MYOVIEW:    Cardiolite (Tc-99m Sestamibi) stress test    CARDIAC CATHETERIZATION:            OTHER:         Assessment/Plan     Principal Problem:    Chest pain  Active  Problems:    2019-nCoV not detected    Hypertension    Gout    Obstructive sleep apnea    GERD with stricture    ]]]]]]]]]]]]]]]]  Impression  ==========  -Chest pain-possible angina pectoris.  Associated sweating and low blood pressure..  Somewhat pleuritic  Troponin levels are negative.  EKG showed no acute changes    -Elevated D-dimer 1.66  CT scan of the chest negative for pulmonary embolus.    -History of thoracic aortic aneurysm    -History of hypertension gout GERD    -Hypotension in the emergency room that improved.    -History of appendectomy and surgery for pyloric stenosis    -Past history of DVT and melanoma    -Allergic to sulfa and seasonal allergies.    -Non-smoker    -COVID-19 negative    -Family history is positive for COPD and cancer.    =========  Plan  ========  Patient presented with chest pain.  Troponin levels are negative.  EKG showed no acute changes.  Chest pain somewhat pleuritic    Hypotension-better.    Elevated D-dimer  Negative CT scan for pulmonary embolus.    Echocardiogram  Stress Cardiolite test    Close cardiac monitoring.    Lipid panel-normal except for an HDL of 37.    Have discussed with patient regarding present work-up and plans.    Further plan will depend on patient's progress.    ]]]]]]]]]]]]]]]                          Bandar Arrington MD  01/03/21  07:45 EST

## 2021-01-03 NOTE — NURSING NOTE
"Pulmonology consulted. Patient expressed that when he was in myoview he had some relief in his breathing when rolling over onto his stomach. Nurse instructed patient to try laying prone and see if this gives relief and patient stated that he was not having to \"force air\" into his lungs as he feels he is having to do otherwise. MD aware.  "

## 2021-01-03 NOTE — H&P
[facility] Medicine Services      Patient Name: Nile Chin  : 1960  MRN: [id number]  Primary Care Physician: Job Santana MD  Date of admission: 2021    Patient Care Team:  Job Santana MD as PCP - General (Internal Medicine)          Subjective   History Present Illness     Chief Complaint:   Chief Complaint   Patient presents with   • Shortness of Breath        Mr. Chin is a 60 y.o.  presents to [facility] complaining of shortness of air accompanied by a sharp substernal chest pain that is worse with deep inspiration, worse when laying flat, alleviated by morphine and nitro while in the ED. The pain felt like a heavy pressure with each breath and radiated into his right shoulder and neck.  He has no previous hx of CAD and no Family hx of CAD, but he had a negative work-up done a few years ago.  Pt was in his normal state of health last night when walking the dog around 10 pm.  He woke up around 2am and was still okay. Then he woke up at 5 am with the aforementioned symptoms accompanied by diaphoresis.  He has a Hx of HTN, gout, GERD, esophageal stricture s/p stretching, melanoma, pyloric stenosis.    While in the ED, he initially had a BP of 142/106, but had a sudden episode of hypotension down to 82/42 with a pulse of 48, he was given a fluid bolus and his blood pressure recovered quickly.      Review of Systems   Constitution: Positive for diaphoresis and weight gain. Negative for chills, fever and malaise/fatigue.   HENT: Negative.    Eyes: Negative.    Cardiovascular: Positive for chest pain, dyspnea on exertion, orthopnea and paroxysmal nocturnal dyspnea. Negative for leg swelling and palpitations.   Respiratory: Positive for shortness of breath. Negative for cough, hemoptysis, sputum production and wheezing.    Skin: Negative.  Negative for rash and suspicious lesions.   Musculoskeletal: Negative.  Negative for arthritis, joint swelling  and neck pain.   Gastrointestinal: Negative.  Negative for abdominal pain, constipation, diarrhea, nausea and vomiting.   Genitourinary: Negative.  Negative for dysuria, flank pain, frequency, hematuria, incomplete emptying, nocturia and urgency.   Neurological: Negative.  Negative for dizziness, headaches, light-headedness and seizures.   Psychiatric/Behavioral: Negative.  Negative for depression. The patient does not have insomnia and is not nervous/anxious.    All other systems reviewed and are negative.          Personal History     Past Medical History:   Past Medical History:   Diagnosis Date   • Allergies    • GERD (gastroesophageal reflux disease)    • Gout    • Hypertension    • Melanoma (CMS/HCC)    • Pyloric stenosis        Surgical History:      Past Surgical History:   Procedure Laterality Date   • APPENDECTOMY             Family History: family history is not on file. Otherwise pertinent FHx was reviewed and unremarkable.     Social History:  reports that he has never smoked. He does not have any smokeless tobacco history on file. He reports that he does not drink alcohol or use drugs.      Medications:  Prior to Admission medications    Medication Sig Start Date End Date Taking? Authorizing Provider   allopurinol (ZYLOPRIM) 300 MG tablet Take 300 mg by mouth Daily. 12/9/11  Yes ProviderLisa MD   fluticasone (FLONASE) 50 MCG/ACT nasal spray 1 spray into the nostril(s) as directed by provider As Needed. 12/9/11  Yes Lisa Hunter MD   lisinopril (PRINIVIL,ZESTRIL) 40 MG tablet Take 40 mg by mouth Daily. 12/9/11  Yes ProviderLisa MD   montelukast (SINGULAIR) 10 MG tablet Take 10 mg by mouth Every Night.   Yes ProviderLisa MD   omeprazole (priLOSEC) 40 MG capsule Take 40 mg by mouth Daily.   Yes ProviderLisa MD   albuterol sulfate  (90 Base) MCG/ACT inhaler Inhale 2 puffs Every 4 (Four) Hours As Needed for Wheezing. 10/15/19 1/2/21  Vee Bae APRN      Apixaban (ELIQUIS STARTER PACK) tablet Take two 5 mg tablets by mouth every 12 hours for 7 days. Followed by one 5 mg tablet every 12 hours. (Dispense starter pack if available) 9/17/19 1/2/21  Vee Bae APRN   benzonatate (TESSALON) 200 MG capsule Take 1 capsule by mouth 3 (Three) Times a Day As Needed for Cough. 10/15/19 1/2/21  Vee Bae APRN   methylPREDNISolone (MEDROL, JARETT,) 4 MG tablet Take as directed on package instructions. 10/15/19 1/2/21  Vee Bae APRN       Allergies:    Allergies   Allergen Reactions   • Sulfa Antibiotics Rash   • Suture Rash     Cat gut suture       Objective   Objective     Vital Signs  Temp:  [98.5 °F (36.9 °C)-99.7 °F (37.6 °C)] 98.5 °F (36.9 °C)  Heart Rate:  [] 100  Resp:  [14-20] 20  BP: ()/(58-90) 156/90  SpO2:  [92 %-95 %] 95 %  on  Flow (L/min):  [1.5-2] 2;   Device (Oxygen Therapy): nasal cannula  Body mass index is 36.16 kg/m².    Physical Exam  Vitals signs reviewed.   Constitutional:       General: He is not in acute distress.     Appearance: Normal appearance. He is obese. He is ill-appearing and diaphoretic. He is not toxic-appearing.   HENT:      Head: Normocephalic and atraumatic.      Mouth/Throat:      Mouth: Mucous membranes are moist.      Pharynx: Oropharynx is clear.   Eyes:      Extraocular Movements: Extraocular movements intact.      Conjunctiva/sclera: Conjunctivae normal.      Pupils: Pupils are equal, round, and reactive to light.   Neck:      Musculoskeletal: Normal range of motion and neck supple.   Cardiovascular:      Rate and Rhythm: Normal rate and regular rhythm.      Pulses: Normal pulses.      Heart sounds: Normal heart sounds. No murmur. No gallop.    Pulmonary:      Effort: Pulmonary effort is normal. No respiratory distress.      Breath sounds: Normal breath sounds. No wheezing, rhonchi or rales.   Abdominal:      General: Bowel sounds are normal. There is no distension.      Palpations: Abdomen is soft.       Tenderness: There is no abdominal tenderness.   Musculoskeletal: Normal range of motion.         General: No swelling, tenderness, deformity or signs of injury.   Lymphadenopathy:      Cervical: No cervical adenopathy.   Skin:     General: Skin is warm.      Findings: No erythema, lesion or rash.   Neurological:      General: No focal deficit present.      Mental Status: He is alert and oriented to person, place, and time. Mental status is at baseline.   Psychiatric:         Mood and Affect: Mood normal.         Behavior: Behavior normal.         Thought Content: Thought content normal.         Judgment: Judgment normal.         Results Review:  I have personally reviewed most recent cardiac tracings, lab results and radiology images and interpretations and agree with findings.    Results from last 7 days   Lab Units 01/03/21  0324 01/02/21  0627   WBC 10*3/mm3 6.10 7.70   HEMOGLOBIN g/dL 12.1* 13.6   HEMATOCRIT % 34.8* 40.3   PLATELETS 10*3/mm3 156 196   INR   --  0.93     Results from last 7 days   Lab Units 01/03/21  0324 01/02/21  2018 01/02/21  1442 01/02/21  0627   SODIUM mmol/L 137  --   --  140   POTASSIUM mmol/L 3.7  --   --  3.7   CHLORIDE mmol/L 102  --   --  99   CO2 mmol/L 29.0  --   --  30.0*   BUN mg/dL 16  --   --  17   CREATININE mg/dL 0.80  --   --  0.89   GLUCOSE mg/dL 129*  --   --  126*   CALCIUM mg/dL 8.6  --   --  9.8   ALT (SGPT) U/L 17  --   --  24   AST (SGOT) U/L 17  --   --  22   TROPONIN T ng/mL  --  <0.010 <0.010 <0.010   PROBNP pg/mL  --   --   --  33.5   PROCALCITONIN ng/mL  --   --   --  0.05     Estimated Creatinine Clearance: 122.6 mL/min (by C-G formula based on SCr of 0.8 mg/dL).  Brief Urine Lab Results  (Last result in the past 365 days)      Color   Clarity   Blood   Leuk Est   Nitrite   Protein   CREAT   Urine HCG        01/02/21 1033 Yellow Clear Negative Negative Negative Negative               Microbiology Results (last 10 days)     Procedure Component Value - Date/Time     S. Pneumo Ag Urine or CSF - Urine, Urine, Clean Catch [[id number]]  (Normal) Collected: 01/02/21 1033    Lab Status: Final result Specimen: Urine, Clean Catch Updated: 01/02/21 1112     Strep Pneumo Ag Negative    Legionella Antigen, Urine - Urine, Urine, Clean Catch [[id number]]  (Normal) Collected: 01/02/21 1033    Lab Status: Final result Specimen: Urine, Clean Catch Updated: 01/02/21 1112     LEGIONELLA ANTIGEN, URINE Negative    Respiratory Panel PCR w/COVID-19(SARS-CoV-2) HERNAN/ROSA/SHAMEKA/PAD/COR/MAD/DORIAN In-House, NP Swab in UTM/VTM, 3-4 HR TAT - Swab, Nasopharynx [[id number]]  (Normal) Collected: 01/02/21 0857    Lab Status: Final result Specimen: Swab from Nasopharynx Updated: 01/02/21 1007     ADENOVIRUS, PCR Not Detected     Coronavirus 229E Not Detected     Coronavirus HKU1 Not Detected     Coronavirus NL63 Not Detected     Coronavirus OC43 Not Detected     COVID19 Not Detected     Human Metapneumovirus Not Detected     Human Rhinovirus/Enterovirus Not Detected     Influenza A PCR Not Detected     Influenza B PCR Not Detected     Parainfluenza Virus 1 Not Detected     Parainfluenza Virus 2 Not Detected     Parainfluenza Virus 3 Not Detected     Parainfluenza Virus 4 Not Detected     RSV, PCR Not Detected     Bordetella pertussis pcr Not Detected     Bordetella parapertussis PCR Not Detected     Chlamydophila pneumoniae PCR Not Detected     Mycoplasma pneumo by PCR Not Detected    Narrative:      Fact sheet for providers: https://docs.[employer]/wp-content/uploads/JPK2928-8791-XI6.1-EUA-Provider-Fact-Sheet-3.pdf    Fact sheet for patients: https://docs.[employer]/wp-content/uploads/SRA5399-1015-PJ3.1-EUA-Patient-Fact-Sheet-1.pdf    Test performed by PCR.    Blood Culture - Blood, Arm, Left [[id number]] Collected: 01/02/21 0853    Lab Status: Preliminary result Specimen: Blood from Arm, Left Updated: 01/03/21 0915     Blood Culture No growth at 24 hours    Blood Culture - Blood, Arm, Right [[id number]]  Collected: 01/02/21 0853    Lab Status: Preliminary result Specimen: Blood from Arm, Right Updated: 01/03/21 0915     Blood Culture No growth at 24 hours    COVID-19, ABBOTT IN-HOUSE,NASAL Swab (NO TRANSPORT MEDIA) 2 HR TAT - Swab, Nasopharynx [[id number]]  (Normal) Collected: 01/02/21 0700    Lab Status: Final result Specimen: Swab from Nasopharynx Updated: 01/02/21 0753     COVID19 Presumptive Negative    Narrative:      Fact sheet for providers: https://www.fda.gov/media/827852/download     Fact sheet for patients: https://www.fda.gov/media/647251/download    Test performed by PCR.  If inconsistent with clinical signs and symptoms patient should be tested with different authorized molecular test.          ECG/EMG Results (most recent)     Procedure Component Value Units Date/Time    ECG 12 Lead [[id number]] Collected: 01/02/21 0617     Updated: 01/02/21 0619     QT Interval 386 ms     Narrative:      HEART RATE= 80  bpm  RR Interval= 748  ms  DE Interval= 164  ms  P Horizontal Axis= 5  deg  P Front Axis= 41  deg  QRSD Interval= 113  ms  QT Interval= 386  ms  QRS Axis= 29  deg  T Wave Axis= 68  deg  - NORMAL ECG -  Sinus rhythm  When compared with ECG of 15-Oct-2019 10:28:18,  No significant change  Electronically Signed By:   Date and Time of Study: 2021-01-02 06:17:02    ECG 12 Lead [[id number]] Resulted: 01/03/21 0434     Updated: 01/03/21 0434    ECG 12 Lead [[id number]] Resulted: 01/03/21 0434     Updated: 01/03/21 0434    ECG 12 Lead [[id number]] Collected: 01/02/21 0647     Updated: 01/03/21 0812     QT Interval 450 ms     Narrative:      HEART RATE= 62  bpm  RR Interval= 1036  ms  DE Interval= 142  ms  P Horizontal Axis= 29  deg  P Front Axis= 22  deg  QRSD Interval= 115  ms  QT Interval= 450  ms  QRS Axis= -20  deg  T Wave Axis= 102  deg  - ABNORMAL ECG -  Sinus rhythm  Atrial premature complexes  Abnrm T, consider ischemia, anterolateral lds  When compared with ECG of 02-Jan-2021 6:17:02,  Significant  repolarization change  Electronically Signed By: Godwin García (Cristopher) 03-Jan-2021 08:11:07  Date and Time of Study: 2021-01-02 06:47:43    Transthoracic Echo Complete With Contrast if Necessary Per Protocol [[id number]] Collected: 01/03/21 1018     Updated: 01/03/21 1332     BSA 2.3 m^2      RVIDd 3.5 cm      IVSd 1.4 cm      LVIDd 5.3 cm      LVIDs 4.0 cm      LVPWd 1.1 cm      IVS/LVPW 1.3     FS 25.2 %      EDV(Teich) 134.7 ml      ESV(Teich) 68.1 ml      EF(Teich) 49.4 %      EDV(cubed) 147.9 ml      ESV(cubed) 61.8 ml      EF(cubed) 58.2 %      LV mass(C)d 271.0 grams      LV mass(C)dI 117.7 grams/m^2      SV(Teich) 66.6 ml      SI(Teich) 28.9 ml/m^2      SV(cubed) 86.1 ml      SI(cubed) 37.4 ml/m^2      Ao root diam 3.2 cm      Ao root area 8.2 cm^2      ACS 2.3 cm      LA dimension 3.2 cm      asc Aorta Diam 3.9 cm      LA/Ao 0.98     LVOT diam 2.4 cm      LVOT area 4.6 cm^2      EDV(MOD-sp4) 136.1 ml      ESV(MOD-sp4) 46.9 ml      EF(MOD-sp4) 65.5 %      EDV(MOD-sp2) 102.1 ml      ESV(MOD-sp2) 30.1 ml      EF(MOD-sp2) 70.5 %      SV(MOD-sp4) 89.1 ml      SI(MOD-sp4) 38.7 ml/m^2      SV(MOD-sp2) 72.0 ml      SI(MOD-sp2) 31.3 ml/m^2      Ao root area (BSA corrected) 1.4     LV Varela Vol (BSA corrected) 59.1 ml/m^2      LV Sys Vol (BSA corrected) 20.4 ml/m^2      Aortic R-R 0.68 sec      Aortic HR 88.6 BPM      MV E max marisabel 92.8 cm/sec      MV A max marisabel 90.3 cm/sec      MV E/A 1.0     MV V2 max 93.5 cm/sec      MV max PG 3.5 mmHg      MV V2 mean 78.4 cm/sec      MV mean PG 2.5 mmHg      MV V2 VTI 22.1 cm      MVA(VTI) 4.4 cm^2      MV dec slope 585.5 cm/sec^2      MV dec time 0.16 sec      Ao pk marisabel 161.6 cm/sec      Ao max PG 10.4 mmHg      Ao max PG (full) 6.1 mmHg      Ao V2 mean 123.4 cm/sec      Ao mean PG 6.4 mmHg      Ao mean PG (full) 3.4 mmHg      Ao V2 VTI 28.6 cm      LENA(I,A) 3.4 cm^2      LENA(I,D) 3.4 cm^2      LENA(V,A) 3.0 cm^2      LENA(V,D) 3.0 cm^2      LV V1 max PG 4.3 mmHg      LV V1 mean PG 3.0  mmHg      LV V1 max 104.1 cm/sec      LV V1 mean 80.6 cm/sec      LV V1 VTI 20.9 cm      CO(Ao) 20.8 l/min      CI(Ao) 9.0 l/min/m^2      SV(Ao) 235.1 ml      SI(Ao) 102.1 ml/m^2      CO(LVOT) 8.5 l/min      CI(LVOT) 3.7 l/min/m^2      SV(LVOT) 96.4 ml      SI(LVOT) 41.9 ml/m^2      PA V2 max 115.5 cm/sec      PA max PG 5.3 mmHg      PA max PG (full) 2.8 mmHg      PA V2 mean 84.6 cm/sec      PA mean PG 3.2 mmHg      PA mean PG (full) 1.9 mmHg      PA V2 VTI 20.3 cm      PA acc time 0.11 sec      PI end-d chester 121.7 cm/sec      PI max chester 181.8 cm/sec      PI max PG 13.2 mmHg      RV V1 max PG 2.5 mmHg      RV V1 mean PG 1.3 mmHg      RV V1 max 78.9 cm/sec      RV V1 mean 52.7 cm/sec      RV V1 VTI 14.4 cm      TR max chester 243.1 cm/sec      RVSP(TR) 27.0 mmHg      RAP systole 3.0 mmHg      PA pr(Accel) 31.0 mmHg      Pulm Sys Chester 53.8 cm/sec      Pulm Varela Chester 40.4 cm/sec      Pulm S/D 1.3     Pulm A Revs Dur 0.16 sec      Pulm A Revs Chester 25.5 cm/sec       CV ECHO SHELLEY - BZI_BMI 36.2 kilograms/m^2       CV ECHO SHELLEY - BSA(HAYCOCK) 2.4 m^2       CV ECHO SHELLEY - BZI_METRIC_WEIGHT 114.3 kg       CV ECHO SHELLEY - BZI_METRIC_HEIGHT 177.8 cm      EF(MOD-bp) 65.0 %      LA dimension(2D) 2.9 cm      Echo EF Estimated 60 %     Narrative:      · Estimated left ventricular EF = 60% Left ventricular systolic function   is normal.     Indications  Chest pain    Technically satisfactory study.  Mitral valve is structurally normal.  Tricuspid valve is structurally normal.  Aortic valve is structurally normal.  Pulmonic valve could not be well visualized.  No evidence for mitral tricuspid or aortic regurgitation is seen by   Doppler study.  Left atrium is normal in size.  Right atrium is normal in size.  Left ventricle is normal in size and contractility with ejection fraction   of 60%.  Right ventricle is normal in size.  Atrial septum is intact.  Aorta is normal.  No pericardial effusion or intracardiac thrombus is  seen.    Impression  Structurally and functionally normal cardiac valves.  Left ventricular size and contractility is normal with ejection fraction   of 60%.          Results for orders placed during the hospital encounter of 09/17/19   Duplex Venous Lower Extremity - Right CAR    Narrative · Acute right lower extremity deep vein thrombosis noted in the   gastrocnemius/soleal.  · All other right sided veins appeared normal.          Results for orders placed during the hospital encounter of 01/02/21   Transthoracic Echo Complete With Contrast if Necessary Per Protocol    Narrative · Estimated left ventricular EF = 60% Left ventricular systolic function   is normal.     Indications  Chest pain    Technically satisfactory study.  Mitral valve is structurally normal.  Tricuspid valve is structurally normal.  Aortic valve is structurally normal.  Pulmonic valve could not be well visualized.  No evidence for mitral tricuspid or aortic regurgitation is seen by   Doppler study.  Left atrium is normal in size.  Right atrium is normal in size.  Left ventricle is normal in size and contractility with ejection fraction   of 60%.  Right ventricle is normal in size.  Atrial septum is intact.  Aorta is normal.  No pericardial effusion or intracardiac thrombus is seen.    Impression  Structurally and functionally normal cardiac valves.  Left ventricular size and contractility is normal with ejection fraction   of 60%.       Ct Abdomen Pelvis With Contrast    Result Date: 1/2/2021   1.  Patchy bilateral lower lobe airspace disease, right greater than left favored to be secondary to pneumonia or less likely atelectasis. 2.  Colonic diverticulosis but no evidence of diverticulitis. 3.  No acute process seen within the abdomen or pelvis.  Electronically Signed By-Navjot Quijano MD On:1/2/2021 8:39 AM This report was finalized on [id number] by  Navjot Quijano MD.    Ct Chest Pulmonary Embolism    Result Date: 1/2/2021   1.  No  evidence of pulmonary embolus 2.  Patchy bilateral lower lobe airspace disease favored to be secondary to pneumonia or less likely atelectasis.  Electronically Signed By-Navjot Quijano MD On:1/2/2021 8:34 AM This report was finalized on [id number] by  Navjot Quijano MD.        Estimated Creatinine Clearance: 122.6 mL/min (by C-G formula based on SCr of 0.8 mg/dL).    Assessment/Plan   Assessment/Plan       Active Hospital Problems    Diagnosis  POA   • **Chest pain [R07.9]  Yes   • 2019-nCoV not detected [Z03.818]  Not Applicable   • Gout [M10.9]  Yes   • Hypertension [I10]  Unknown   • GERD with stricture [K21.9, K22.2]  No   • Obstructive sleep apnea [G47.33]  Yes      Resolved Hospital Problems   No resolved problems to display.       Chest pain  -No previous cardiac history, although he has had a negative work-up in the past  -Serial troponin, repeat EKG, to rule out ACS  -Patient tested negative for COVID-19  -CT chest Abdo pelvis reviewed above  -Check 2D echo for LV function and any area of hypokinesis, especially given acute hypotensive episode  -Consider Lexiscan stress test in a.m.  -Continuous cardiac monitoring  -Check lipids with a.m. labs  -Consult cardiology for additional evaluation    Essential hypertension  -On lisinopril 40 mg daily at home  -Held on admission due to acute hypotension episode while in ED  -Continue to monitor BP and vitals for now  -Will restart lisinopril when BP will tolerate    GERD with history of esophageal stricture and dilatation  -Continue pantoprazole    Obstructive sleep apnea  -Patient is compliant with home CPAP  -May bring home machine in or have settings called in for CPAP at night    Gout  -Continue allopurinol      VTE Prophylaxis -   Mechanical Order History:      Ordered        01/02/21 1243  Place Sequential Compression Device  Once         01/02/21 1243  Maintain Sequential Compression Device  Continuous                 Pharmalogical Order History:       Ordered     Dose Route Frequency Stop    01/02/21 1243  Pharmacy to Dose enoxaparin (LOVENOX)     Question:  Indication of use  Answer:  Prophylaxis    -- XX Continuous PRN --                CODE STATUS:    Code Status and Medical Interventions:   Ordered at: 01/02/21 1243     Code Status:    CPR     Medical Interventions (Level of Support Prior to Arrest):    Full       This patient has been examined wearing appropriate Personal Protective Equipment. 01/03/21      I discussed the patient's findings and my recommendations with patient and nursing staff.      Signature: Electronically signed by Sydnee Wan MD, 01/02/21, 4:28 PM EST.]    [facility] Hospitalist Team

## 2021-01-03 NOTE — PLAN OF CARE
Goal Outcome Evaluation:         Patient echo and myoview WNL. Patient has been using incentive spirometry this shift and is tolerating well and meeting set goals appropriately. Initially pt was hitting 250 but he is up to 1000 now. Antibiotics for his PNA to be restarted. Nurse will continue to monitor.

## 2021-01-04 VITALS
TEMPERATURE: 98.6 F | HEIGHT: 70 IN | RESPIRATION RATE: 16 BRPM | OXYGEN SATURATION: 94 % | BODY MASS INDEX: 36.08 KG/M2 | DIASTOLIC BLOOD PRESSURE: 99 MMHG | SYSTOLIC BLOOD PRESSURE: 150 MMHG | HEART RATE: 77 BPM | WEIGHT: 252 LBS

## 2021-01-04 LAB
FERRITIN SERPL-MCNC: 477 NG/ML (ref 30–400)
PROCALCITONIN SERPL-MCNC: 0.24 NG/ML (ref 0–0.25)
WHOLE BLOOD HOLD SPECIMEN: NORMAL

## 2021-01-04 PROCEDURE — 84145 PROCALCITONIN (PCT): CPT | Performed by: INTERNAL MEDICINE

## 2021-01-04 PROCEDURE — 99214 OFFICE O/P EST MOD 30 MIN: CPT | Performed by: INTERNAL MEDICINE

## 2021-01-04 PROCEDURE — 96366 THER/PROPH/DIAG IV INF ADDON: CPT

## 2021-01-04 PROCEDURE — 99217 PR OBSERVATION CARE DISCHARGE MANAGEMENT: CPT | Performed by: INTERNAL MEDICINE

## 2021-01-04 PROCEDURE — G0378 HOSPITAL OBSERVATION PER HR: HCPCS

## 2021-01-04 PROCEDURE — 82728 ASSAY OF FERRITIN: CPT | Performed by: INTERNAL MEDICINE

## 2021-01-04 PROCEDURE — 25010000003 AMPICILLIN-SULBACTAM PER 1.5 G: Performed by: INTERNAL MEDICINE

## 2021-01-04 RX ORDER — AMOXICILLIN AND CLAVULANATE POTASSIUM 875; 125 MG/1; MG/1
1 TABLET, FILM COATED ORAL EVERY 12 HOURS SCHEDULED
Qty: 13 TABLET | Refills: 0 | Status: SHIPPED | OUTPATIENT
Start: 2021-01-04 | End: 2021-01-11

## 2021-01-04 RX ORDER — LISINOPRIL 20 MG/1
40 TABLET ORAL
Status: DISCONTINUED | OUTPATIENT
Start: 2021-01-04 | End: 2021-01-04 | Stop reason: HOSPADM

## 2021-01-04 RX ORDER — AMOXICILLIN AND CLAVULANATE POTASSIUM 875; 125 MG/1; MG/1
1 TABLET, FILM COATED ORAL EVERY 12 HOURS SCHEDULED
Status: DISCONTINUED | OUTPATIENT
Start: 2021-01-04 | End: 2021-01-04 | Stop reason: HOSPADM

## 2021-01-04 RX ADMIN — PANTOPRAZOLE SODIUM 40 MG: 40 TABLET, DELAYED RELEASE ORAL at 08:26

## 2021-01-04 RX ADMIN — LISINOPRIL 40 MG: 20 TABLET ORAL at 11:24

## 2021-01-04 RX ADMIN — SODIUM CHLORIDE 1.5 G: 900 INJECTION INTRAVENOUS at 10:19

## 2021-01-04 RX ADMIN — ALLOPURINOL 300 MG: 300 TABLET ORAL at 08:26

## 2021-01-04 RX ADMIN — ASPIRIN 81 MG: 81 TABLET, COATED ORAL at 08:26

## 2021-01-04 RX ADMIN — SODIUM CHLORIDE 1.5 G: 900 INJECTION INTRAVENOUS at 02:07

## 2021-01-04 RX ADMIN — Medication 10 ML: at 08:25

## 2021-01-04 RX ADMIN — AMOXICILLIN AND CLAVULANATE POTASSIUM 1 TABLET: 875; 125 TABLET, FILM COATED ORAL at 11:24

## 2021-01-04 NOTE — DISCHARGE SUMMARY
[facility] Medicine Services  DISCHARGE SUMMARY        Prepared For PCP:  Job Santana MD    Patient Name: Nile Chin  : 1960  MRN: [id number]      Date of Admission:   2021    Date of Discharge:  2021    Length of stay:  LOS: 0 days     Hospital Course     Presenting Problem:   Bradycardia [R00.1]  Hypotension, unspecified hypotension type [I95.9]  Chest pain, unspecified type [R07.9]  Pneumonia due to infectious organism, unspecified laterality, unspecified part of lung [J18.9]  2019-nCoV not detected [Z03.818]      Active Hospital Problems    Diagnosis  POA   • **Chest pain [R07.9]  Yes   • 2019-nCoV not detected [Z03.818]  Not Applicable   • Gout [M10.9]  Yes   • Hypertension [I10]  Unknown   • GERD with stricture [K21.9, K22.2]  No   • Obstructive sleep apnea [G47.33]  Yes      Resolved Hospital Problems   No resolved problems to display.           Hospital Course:  60-year-old male with medical history significant for hypertension, GERD and obstructive sleep apnea.  Presented to the emergency room on 2021 with complaints of substernal chest pain.  Patient was admitted as observation for acute coronary syndrome ruled out.  Serial troponin was done which was insignificant.  EKG showed no acute changes.  Cardiologist was consulted and patient had stress Cardiolite test which was negative for myocardial ischemia.  Per cardiologist okay to be discharged from cardiac standpoint to follow-up in the office in 2 to 3 weeks.    Patient had elevated D-dimer however CT PE protocol was negative for PE  Imaging was suggestive of right lower lobe pneumonia.  COVID-19 test and viral panel were negative.  Patient was seen by the pulmonologist recommended starting patient on antibiotics-Unasyn.  He will be discharged on Augmentin to complete antibiotics course    Patient has remained hemodynamically stable and saturating greater than 94 on room air.    He will be  discharged to follow-up with PCP as outpatient         Recommendation for Outpatient Providers:             Reasons For Change In Medications and Indications for New Medications:        Day of Discharge     HPI:       Vital Signs:   Temp:  [97.7 °F (36.5 °C)-98.7 °F (37.1 °C)] 97.8 °F (36.6 °C)  Heart Rate:  [] 70  Resp:  [16-18] 17  BP: (139-154)/(83-99) 150/99     Physical Exam:  Physical Exam  Vitals signs reviewed.   Constitutional:       General: He is not in acute distress.  HENT:      Head: Normocephalic and atraumatic.      Nose: Nose normal.      Mouth/Throat:      Mouth: Mucous membranes are moist.   Eyes:      Extraocular Movements: Extraocular movements intact.      Conjunctiva/sclera: Conjunctivae normal.      Pupils: Pupils are equal, round, and reactive to light.   Neck:      Musculoskeletal: Neck supple.   Cardiovascular:      Rate and Rhythm: Normal rate and regular rhythm.      Heart sounds: Normal heart sounds.   Pulmonary:      Effort: Pulmonary effort is normal. No respiratory distress.      Breath sounds: Normal breath sounds.   Abdominal:      General: Bowel sounds are normal.      Palpations: Abdomen is soft.      Tenderness: There is no abdominal tenderness.   Musculoskeletal: Normal range of motion.   Skin:     General: Skin is warm and dry.   Neurological:      General: No focal deficit present.      Mental Status: He is alert and oriented to person, place, and time.   Psychiatric:         Mood and Affect: Mood normal.         Pertinent  and/or Most Recent Results     Results from last 7 days   Lab Units 01/03/21  0324 01/02/21  0627   WBC 10*3/mm3 6.10 7.70   HEMOGLOBIN g/dL 12.1* 13.6   HEMATOCRIT % 34.8* 40.3   PLATELETS 10*3/mm3 156 196   SODIUM mmol/L 137 140   POTASSIUM mmol/L 3.7 3.7   CHLORIDE mmol/L 102 99   CO2 mmol/L 29.0 30.0*   BUN mg/dL 16 17   CREATININE mg/dL 0.80 0.89   GLUCOSE mg/dL 129* 126*   CALCIUM mg/dL 8.6 9.8     Results from last 7 days   Lab Units  01/03/21 0324 01/02/21  0627   BILIRUBIN mg/dL 0.7 0.5   ALK PHOS U/L 73 94   ALT (SGPT) U/L 17 24   AST (SGOT) U/L 17 22   PROTIME Seconds  --  10.3   INR   --  0.93   APTT seconds  --  24.4     Results from last 7 days   Lab Units 01/03/21  0324   CHOLESTEROL mg/dL 142   TRIGLYCERIDES mg/dL 123   HDL CHOL mg/dL 37*     Results from last 7 days   Lab Units 01/04/21  0445 01/03/21  0324 01/02/21 2018 01/02/21  1442 01/02/21  0627   TSH uIU/mL  --  0.403  --   --   --    PROBNP pg/mL  --  149.0  --   --  33.5   TROPONIN T ng/mL  --   --  <0.010 <0.010 <0.010   PROCALCITONIN ng/mL 0.24  --   --   --  0.05       Brief Urine Lab Results  (Last result in the past 365 days)      Color   Clarity   Blood   Leuk Est   Nitrite   Protein   CREAT   Urine HCG        01/02/21 1033 Yellow Clear Negative Negative Negative Negative               Microbiology Results Abnormal     Procedure Component Value - Date/Time    Blood Culture - Blood, Arm, Left [[id number]] Collected: 01/02/21 0853    Lab Status: Preliminary result Specimen: Blood from Arm, Left Updated: 01/04/21 0915     Blood Culture No growth at 2 days    Blood Culture - Blood, Arm, Right [[id number]] Collected: 01/02/21 0853    Lab Status: Preliminary result Specimen: Blood from Arm, Right Updated: 01/04/21 0915     Blood Culture No growth at 2 days    S. Pneumo Ag Urine or CSF - Urine, Urine, Clean Catch [[id number]]  (Normal) Collected: 01/02/21 1033    Lab Status: Final result Specimen: Urine, Clean Catch Updated: 01/02/21 1112     Strep Pneumo Ag Negative    Legionella Antigen, Urine - Urine, Urine, Clean Catch [[id number]]  (Normal) Collected: 01/02/21 1033    Lab Status: Final result Specimen: Urine, Clean Catch Updated: 01/02/21 1112     LEGIONELLA ANTIGEN, URINE Negative    Respiratory Panel PCR w/COVID-19(SARS-CoV-2) HERNAN/ROSA/SHAMEKA/PAD/COR/MAD/DORIAN In-House, NP Swab in UTM/VTM, 3-4 HR TAT - Swab, Nasopharynx [[id number]]  (Normal) Collected: 01/02/21 0857    Lab Status:  Final result Specimen: Swab from Nasopharynx Updated: 01/02/21 1007     ADENOVIRUS, PCR Not Detected     Coronavirus 229E Not Detected     Coronavirus HKU1 Not Detected     Coronavirus NL63 Not Detected     Coronavirus OC43 Not Detected     COVID19 Not Detected     Human Metapneumovirus Not Detected     Human Rhinovirus/Enterovirus Not Detected     Influenza A PCR Not Detected     Influenza B PCR Not Detected     Parainfluenza Virus 1 Not Detected     Parainfluenza Virus 2 Not Detected     Parainfluenza Virus 3 Not Detected     Parainfluenza Virus 4 Not Detected     RSV, PCR Not Detected     Bordetella pertussis pcr Not Detected     Bordetella parapertussis PCR Not Detected     Chlamydophila pneumoniae PCR Not Detected     Mycoplasma pneumo by PCR Not Detected    Narrative:      Fact sheet for providers: https://docs.[employer]/wp-content/uploads/RTF2330-0794-QW0.1-EUA-Provider-Fact-Sheet-3.pdf    Fact sheet for patients: https://docs.[employer]/wp-content/uploads/RWD0557-8236-RR7.1-EUA-Patient-Fact-Sheet-1.pdf    Test performed by PCR.    COVID-19, ABBOTT IN-HOUSE,NASAL Swab (NO TRANSPORT MEDIA) 2 HR TAT - Swab, Nasopharynx [[id number]]  (Normal) Collected: 01/02/21 0700    Lab Status: Final result Specimen: Swab from Nasopharynx Updated: 01/02/21 0753     COVID19 Presumptive Negative    Narrative:      Fact sheet for providers: https://www.fda.gov/media/061208/download     Fact sheet for patients: https://www.fda.gov/media/727283/download    Test performed by PCR.  If inconsistent with clinical signs and symptoms patient should be tested with different authorized molecular test.          Ct Abdomen Pelvis With Contrast    Result Date: 1/2/2021  Impression:  1.  Patchy bilateral lower lobe airspace disease, right greater than left favored to be secondary to pneumonia or less likely atelectasis. 2.  Colonic diverticulosis but no evidence of diverticulitis. 3.  No acute process seen within the abdomen or pelvis.   Electronically Signed By-Navjot Quijano MD On:1/2/2021 8:39 AM This report was finalized on [id number] by  Navjot Quijano MD.    Ct Chest Pulmonary Embolism    Result Date: 1/2/2021  Impression:  1.  No evidence of pulmonary embolus 2.  Patchy bilateral lower lobe airspace disease favored to be secondary to pneumonia or less likely atelectasis.  Electronically Signed By-Navjot Quijano MD On:1/2/2021 8:34 AM This report was finalized on [id number] by  Navjot Quijano MD.      Results for orders placed during the hospital encounter of 09/17/19   Duplex Venous Lower Extremity - Right CAR    Narrative · Acute right lower extremity deep vein thrombosis noted in the   gastrocnemius/soleal.  · All other right sided veins appeared normal.          Results for orders placed during the hospital encounter of 09/17/19   Duplex Venous Lower Extremity - Right CAR    Narrative · Acute right lower extremity deep vein thrombosis noted in the   gastrocnemius/soleal.  · All other right sided veins appeared normal.          Results for orders placed during the hospital encounter of 01/02/21   Transthoracic Echo Complete With Contrast if Necessary Per Protocol    Narrative · Estimated left ventricular EF = 60% Left ventricular systolic function   is normal.     Indications  Chest pain    Technically satisfactory study.  Mitral valve is structurally normal.  Tricuspid valve is structurally normal.  Aortic valve is structurally normal.  Pulmonic valve could not be well visualized.  No evidence for mitral tricuspid or aortic regurgitation is seen by   Doppler study.  Left atrium is normal in size.  Right atrium is normal in size.  Left ventricle is normal in size and contractility with ejection fraction   of 60%.  Right ventricle is normal in size.  Atrial septum is intact.  Aorta is normal.  No pericardial effusion or intracardiac thrombus is seen.    Impression  Structurally and functionally normal cardiac valves.  Left  ventricular size and contractility is normal with ejection fraction   of 60%.               Test Results Pending at Discharge  Pending Labs     Order Current Status    Blood Culture - Blood, Arm, Left Preliminary result    Blood Culture - Blood, Arm, Right Preliminary result            Procedures Performed           Consults:   Consults     Date and Time Order Name Status Description    1/3/2021 1521 Inpatient Pulmonology Consult Completed     1/2/2021 1512 Inpatient Cardiology Consult Completed     1/2/2021 1025 Hospitalist (on-call MD unless specified) Completed             Discharge Details        Discharge Medications      New Medications      Instructions Start Date   amoxicillin-clavulanate 875-125 MG per tablet  Commonly known as: AUGMENTIN   1 tablet, Oral, Every 12 Hours Scheduled         Continue These Medications      Instructions Start Date   allopurinol 300 MG tablet  Commonly known as: ZYLOPRIM   300 mg, Oral, Daily      Flonase 50 MCG/ACT nasal spray  Generic drug: fluticasone   1 spray, Nasal, As Needed      lisinopril 40 MG tablet  Commonly known as: PRINIVIL,ZESTRIL   40 mg, Oral, Daily      montelukast 10 MG tablet  Commonly known as: SINGULAIR   10 mg, Oral, Nightly      omeprazole 40 MG capsule  Commonly known as: priLOSEC   40 mg, Oral, Daily             Allergies   Allergen Reactions   • Sulfa Antibiotics Rash   • Suture Rash     Cat gut suture         Discharge Disposition:  Home or Self Care    Diet:  Hospital:  Diet Order   Procedures   • Diet Cardiac; Healthy Heart         Discharge Activity:   Activity Instructions     Activity as Tolerated              CODE STATUS:    Code Status and Medical Interventions:   Ordered at: 01/02/21 1243     Code Status:    CPR     Medical Interventions (Level of Support Prior to Arrest):    Full         Follow-up Appointments  No future appointments.    Additional Instructions for the Follow-ups that You Need to Schedule     Discharge Follow-up with PCP    As directed       Currently Documented PCP:    Job Santana MD    PCP Phone Number:    [phone]     Follow Up Details: Follow-up with PCP as needed         Discharge Follow-up with Specified Provider: Follow-up with cardiologist in 2 to 3 weeks   As directed      To: Follow-up with cardiologist in 2 to 3 weeks                 Condition on Discharge:      Stable      This patient has been examined with appropriate PPE . 01/04/21      Electronically signed by Simone Mirza MD, 01/04/21, 10:32 AM EST.      Time: I spent  33 minutes on this discharge activity which included face-to-face encounter with the patient/reviewing the data in the system/coordination of the care with the nursing staff as well as consultants/documentation/entering orders.

## 2021-01-04 NOTE — PLAN OF CARE
Problem: Adult Inpatient Plan of Care  Goal: Plan of Care Review  Outcome: Ongoing, Progressing  Goal: Patient-Specific Goal (Individualized)  Outcome: Ongoing, Progressing  Goal: Absence of Hospital-Acquired Illness or Injury  Outcome: Ongoing, Progressing  Intervention: Identify and Manage Fall Risk  Recent Flowsheet Documentation  Taken 1/3/2021 2030 by Fannie Sharpe RN  Safety Promotion/Fall Prevention:   safety round/check completed   fall prevention program maintained   clutter free environment maintained   assistive device/personal items within reach   activity supervised  Intervention: Prevent Skin Injury  Recent Flowsheet Documentation  Taken 1/4/2021 0026 by Fannie Sharpe RN  Body Position: position changed independently  Taken 1/3/2021 2030 by Fannie Sharpe RN  Body Position: position changed independently  Intervention: Prevent Infection  Recent Flowsheet Documentation  Taken 1/4/2021 0026 by Fannie Sharpe RN  Infection Prevention:   visitors restricted/screened   single patient room provided   rest/sleep promoted   personal protective equipment utilized   hand hygiene promoted  Taken 1/3/2021 2030 by Fannie Sharpe RN  Infection Prevention:   visitors restricted/screened   single patient room provided   rest/sleep promoted   personal protective equipment utilized   hand hygiene promoted  Goal: Optimal Comfort and Wellbeing  Outcome: Ongoing, Progressing  Goal: Readiness for Transition of Care  Outcome: Ongoing, Progressing     Problem: Chest Pain  Goal: Resolution of Chest Pain Symptoms  Outcome: Ongoing, Progressing     Problem: Fluid Imbalance (Pneumonia)  Goal: Fluid Balance  Outcome: Ongoing, Progressing     Problem: Infection (Pneumonia)  Goal: Resolution of Infection Signs and Symptoms  Outcome: Ongoing, Progressing     Problem: Respiratory Compromise (Pneumonia)  Goal: Effective Oxygenation and Ventilation  Outcome: Ongoing, Progressing  Intervention: Optimize Oxygenation and Ventilation  Recent Flowsheet  Documentation  Taken 1/4/2021 0026 by Fannie Sharpe, RN  Head of Bed ([facility]): [facility] lowered   Goal Outcome Evaluation:

## 2021-01-04 NOTE — PLAN OF CARE
Problem: Adult Inpatient Plan of Care  Goal: Plan of Care Review  Outcome: Ongoing, Progressing  Flowsheets (Taken 1/4/2021 1306)  Plan of Care Reviewed With: patient  Goal: Patient-Specific Goal (Individualized)  Outcome: Ongoing, Progressing  Goal: Absence of Hospital-Acquired Illness or Injury  Outcome: Ongoing, Progressing  Intervention: Identify and Manage Fall Risk  Recent Flowsheet Documentation  Taken 1/4/2021 1159 by Coleen Salamanca RN  Safety Promotion/Fall Prevention:   safety round/check completed   nonskid shoes/slippers when out of bed  Taken 1/4/2021 1010 by Coleen Salamanca RN  Safety Promotion/Fall Prevention:   safety round/check completed   nonskid shoes/slippers when out of bed  Taken 1/4/2021 0820 by Coleen Salamanca RN  Safety Promotion/Fall Prevention:   safety round/check completed   nonskid shoes/slippers when out of bed  Intervention: Prevent Infection  Recent Flowsheet Documentation  Taken 1/4/2021 1159 by Coleen Salamanca RN  Infection Prevention:   visitors restricted/screened   single patient room provided   personal protective equipment utilized   hand hygiene promoted  Taken 1/4/2021 1010 by Coleen Salamanca RN  Infection Prevention:   visitors restricted/screened   single patient room provided   personal protective equipment utilized   hand hygiene promoted  Taken 1/4/2021 0820 by Coleen Salamanca RN  Infection Prevention:   single patient room provided   visitors restricted/screened   personal protective equipment utilized   hand hygiene promoted  Goal: Optimal Comfort and Wellbeing  Outcome: Ongoing, Progressing  Intervention: Provide Person-Centered Care  Recent Flowsheet Documentation  Taken 1/4/2021 0820 by Coleen Salamanca RN  Trust Relationship/Rapport:   care explained   reassurance provided   questions encouraged   questions answered   thoughts/feelings acknowledged  Goal: Readiness for Transition of Care  Outcome: Ongoing, Progressing     Problem:  Chest Pain  Goal: Resolution of Chest Pain Symptoms  Outcome: Ongoing, Progressing     Problem: Fluid Imbalance (Pneumonia)  Goal: Fluid Balance  Outcome: Ongoing, Progressing     Problem: Infection (Pneumonia)  Goal: Resolution of Infection Signs and Symptoms  Outcome: Ongoing, Progressing     Problem: Respiratory Compromise (Pneumonia)  Goal: Effective Oxygenation and Ventilation  Outcome: Ongoing, Progressing  Intervention: Promote Airway Secretion Clearance  Recent Flowsheet Documentation  Taken 1/4/2021 0820 by Coleen Salamanca RN  Cough And Deep Breathing: done independently per patient   Goal Outcome Evaluation:  Plan of Care Reviewed With: patient   Pt reports improved symptoms. Lisinopril restarted today.  RN will continue to monitor.

## 2021-01-04 NOTE — PROGRESS NOTES
Discharge Planning Assessment  [facility]     Patient Name: Nile Chin  MRN: [id number]  Today's Date: 1/4/2021    Admit Date: 1/2/2021    Discharge Needs Assessment     Row Name 01/04/21 1327       Living Environment    Lives With  spouse    Current Living Arrangements  home/apartment/condo    Primary Care Provided by  self    Provides Primary Care For  no one    Family Caregiver if Needed  spouse    Quality of Family Relationships  helpful    Able to Return to Prior Arrangements  yes       Resource/Environmental Concerns    Resource/Environmental Concerns  none    Transportation Concerns  car, none       Transition Planning    Patient/Family Anticipates Transition to  home with family    Patient/Family Anticipated Services at Transition      Transportation Anticipated  family or friend will provide       Discharge Needs Assessment    Readmission Within the Last 30 Days  no previous admission in last 30 days    Equipment Currently Used at Home  cpap    Concerns to be Addressed  denies needs/concerns at this time;no discharge needs identified    Anticipated Changes Related to Illness  none    Equipment Needed After Discharge  none        Discharge Plan     Row Name 01/04/21 1327       Plan    Plan  Anticipate routine home    Patient/Family in Agreement with Plan  yes    Plan Comments  Met with patient at bedside, reports he lives at home with spouse. I with ADLs, still drives. PCP confirmed. No issues affording food or medications. Currently denies any d/c needs or concerns. Cardiology noted okay for d/c today. Pulmonary following, PO abx, room air. Potential d/c home today, awaiting MD rounds.        Expected Discharge Date and Time     Expected Discharge Date Expected Discharge Time    Jan 4, 2021         Demographic Summary     Row Name 01/04/21 1326       General Information    Admission Type  observation    Arrived From  emergency department    Referral Source  admission list    Reason for  Consult  discharge planning    Preferred Language  English        Functional Status     Row Name 01/04/21 1326       Functional Status    Usual Activity Tolerance  good    Current Activity Tolerance  good       Functional Status, IADL    Medications  independent    Meal Preparation  independent    Housekeeping  independent    Laundry  independent    Shopping  independent        Met with patient in room wearing PPE: mask, goggles.     Maintained distance greater than six feet and spent less than 15 minutes in the room.              Maya Montero RN

## 2021-01-04 NOTE — PROGRESS NOTES
Referring Provider: Sydnee Wan MD    Reason for follow-up:  Chest pain     Patient Care Team:  Job Santana MD as PCP - General (Internal Medicine)    Subjective .  Feeling okay     ROS    Since I have last seen him yesterday, the patient has been without any chest discomfort ,shortness of breath, palpitations, dizziness or syncope.  Denies having any headache ,abdominal pain ,nausea, vomiting , diarrhea constipation, loss of weight or loss of appetite.  Denies having any excessive bruising ,hematuria or blood in the stool.    Review of all systems negative except as indicated    History  Past Medical History:   Diagnosis Date   • Allergies    • GERD (gastroesophageal reflux disease)    • Gout    • Hypertension    • Melanoma (CMS/HCC)    • Pyloric stenosis        Past Surgical History:   Procedure Laterality Date   • APPENDECTOMY         History reviewed. No pertinent family history.    Social History     Tobacco Use   • Smoking status: Never Smoker   Substance Use Topics   • Alcohol use: No     Frequency: Never   • Drug use: No        Medications Prior to Admission   Medication Sig Dispense Refill Last Dose   • allopurinol (ZYLOPRIM) 300 MG tablet Take 300 mg by mouth Daily.   1/1/2021 at Unknown time   • fluticasone (FLONASE) 50 MCG/ACT nasal spray 1 spray into the nostril(s) as directed by provider As Needed.   1/1/2021 at Unknown time   • lisinopril (PRINIVIL,ZESTRIL) 40 MG tablet Take 40 mg by mouth Daily.   1/1/2021 at Unknown time   • montelukast (SINGULAIR) 10 MG tablet Take 10 mg by mouth Every Night.   1/1/2021 at Unknown time   • omeprazole (priLOSEC) 40 MG capsule Take 40 mg by mouth Daily.   1/1/2021 at Unknown time       Allergies  Sulfa antibiotics and Suture    Scheduled Meds:allopurinol, 300 mg, Oral, Daily  ampicillin-sulbactam, 1.5 g, Intravenous, Q8H  aspirin, 81 mg, Oral, Daily  enoxaparin, 40 mg, Subcutaneous, Q24H  montelukast, 10 mg, Oral, Nightly  pantoprazole, 40 mg, Oral,  "QAM  sodium chloride, 10 mL, Intravenous, Q12H      Continuous Infusions:Pharmacy to Dose enoxaparin (LOVENOX),       PRN Meds:.•  acetaminophen **OR** acetaminophen **OR** acetaminophen  •  aluminum-magnesium hydroxide-simethicone  •  bisacodyl  •  bisacodyl  •  calcium carbonate  •  magnesium hydroxide  •  melatonin  •  nitroglycerin  •  ondansetron **OR** ondansetron  •  Pharmacy to Dose enoxaparin (LOVENOX)  •  [COMPLETED] Insert peripheral IV **AND** sodium chloride  •  sodium chloride    Objective     VITAL SIGNS  Vitals:    01/03/21 1843 01/03/21 2248 01/04/21 0249 01/04/21 0623   BP:  139/92 140/83 144/89   BP Location:  Right arm Right arm Left arm   Patient Position:  Lying Lying Lying   Pulse: 90 77 80 87   Resp:  18 16 18   Temp: 98.4 °F (36.9 °C) 98.7 °F (37.1 °C) 98.4 °F (36.9 °C) 97.7 °F (36.5 °C)   TempSrc:  Oral Oral Oral   SpO2: 97% 96% 93% 96%   Weight:       Height:           Flowsheet Rows      First Filed Value   Admission Height  180.3 cm (71\") Documented at 01/02/2021 0558   Admission Weight  114 kg (251 lb) Documented at 01/02/2021 0558            Intake/Output Summary (Last 24 hours) at 1/4/2021 0645  Last data filed at 1/3/2021 1900  Gross per 24 hour   Intake 1280 ml   Output --   Net 1280 ml        TELEMETRY: Sinus rhythm    Physical Exam:  The patient is alert, oriented and in no distress.  Vital signs as noted above.  Head and neck revealed no carotid bruits or jugular venous distention.  No thyromegaly or lymphadenopathy is present  Lungs clear.  No wheezing.  Breath sounds are normal bilaterally.  Heart normal first and second heart sounds.  No murmur. No precordial rub is present.  No gallop is present.  Abdomen soft and nontender.  No organomegaly is present.  Extremities with good peripheral pulses without any pedal edema.  Skin warm and dry.  Musculoskeletal system is grossly normal  CNS grossly normal      Results Review:   I reviewed the patient's new clinical results.  Lab " "Results (last 24 hours)     Procedure Component Value Units Date/Time    Ferritin [[id number]] Collected: 01/04/21 0445    Specimen: Blood Updated: 01/04/21 0641    Procalcitonin [[id number]]  (Normal) Collected: 01/04/21 0445    Specimen: Blood Updated: 01/04/21 0611     Procalcitonin 0.24 ng/mL     Narrative:      As a Marker for Sepsis (Non-Neonates):   1. <0.5 ng/mL represents a low risk of severe sepsis and/or septic shock.  1. >2 ng/mL represents a high risk of severe sepsis and/or septic shock.    As a Marker for Lower Respiratory Tract Infections that require antibiotic therapy:  PCT on Admission     Antibiotic Therapy             6-12 Hrs later  > 0.5                Strongly Recommended            >0.25 - <0.5         Recommended  0.1 - 0.25           Discouraged                   Remeasure/reassess PCT  <0.1                 Strongly Discouraged          Remeasure/reassess PCT      As 28 day mortality risk marker: \"Change in Procalcitonin Result\" (> 80 % or <=80 %) if Day 0 (or Day 1) and Day 4 values are available. Refer to http://www.[employer]-pct-calculator.AppEnsure/   Change in PCT <=80 %   A decrease of PCT levels below or equal to 80 % defines a positive change in PCT test result representing a higher risk for 28-day all-cause mortality of patients diagnosed with severe sepsis or septic shock.  Change in PCT > 80 %   A decrease of PCT levels of more than 80 % defines a negative change in PCT result representing a lower risk for 28-day all-cause mortality of patients diagnosed with severe sepsis or septic shock.                Results may be falsely decreased if patient taking Biotin.     Extra Tubes [[id number]] Collected: 01/04/21 0445    Specimen: Blood, Venous Line Updated: 01/04/21 0545    Narrative:      The following orders were created for panel order Extra Tubes.  Procedure                               Abnormality         Status                     ---------                               -----------     "     ------                     Lavender Top[[id number]]                                     Final result                 Please view results for these tests on the individual orders.    Lavender Top [[id number]] Collected: 01/04/21 0445    Specimen: Blood Updated: 01/04/21 0545     Extra Tube hold for add-on     Comment: Auto resulted       C-reactive Protein [[id number]]  (Abnormal) Collected: 01/03/21 0324    Specimen: Blood Updated: 01/03/21 2011     C-Reactive Protein 9.05 mg/dL     BNP [[id number]]  (Normal) Collected: 01/03/21 0324    Specimen: Blood Updated: 01/03/21 2006     proBNP 149.0 pg/mL     Narrative:      Among patients with dyspnea, NT-proBNP is highly sensitive for the detection of acute congestive heart failure. In addition NT-proBNP of <300 pg/ml effectively rules out acute congestive heart failure with 99% negative predictive value.    Results may be falsely decreased if patient taking Biotin.      Blood Culture - Blood, Arm, Left [[id number]] Collected: 01/02/21 0853    Specimen: Blood from Arm, Left Updated: 01/03/21 0915     Blood Culture No growth at 24 hours    Blood Culture - Blood, Arm, Right [[id number]] Collected: 01/02/21 0853    Specimen: Blood from Arm, Right Updated: 01/03/21 0915     Blood Culture No growth at 24 hours          Imaging Results (Last 24 Hours)     ** No results found for the last 24 hours. **      LAB RESULTS (LAST 7 DAYS)    CBC  Results from last 7 days   Lab Units 01/03/21 0324 01/02/21 0627   WBC 10*3/mm3 6.10 7.70   RBC 10*6/mm3 4.00* 4.72   HEMOGLOBIN g/dL 12.1* 13.6   HEMATOCRIT % 34.8* 40.3   MCV fL 87.0 85.4   PLATELETS 10*3/mm3 156 196       BMP  Results from last 7 days   Lab Units 01/03/21 0324 01/02/21 0627   SODIUM mmol/L 137 140   POTASSIUM mmol/L 3.7 3.7   CHLORIDE mmol/L 102 99   CO2 mmol/L 29.0 30.0*   BUN mg/dL 16 17   CREATININE mg/dL 0.80 0.89   GLUCOSE mg/dL 129* 126*       CMP   Results from last 7 days   Lab Units 01/03/21 0324 01/02/21 0627     SODIUM mmol/L 137 140   POTASSIUM mmol/L 3.7 3.7   CHLORIDE mmol/L 102 99   CO2 mmol/L 29.0 30.0*   BUN mg/dL 16 17   CREATININE mg/dL 0.80 0.89   GLUCOSE mg/dL 129* 126*   ALBUMIN g/dL 3.70 4.50   BILIRUBIN mg/dL 0.7 0.5   ALK PHOS U/L 73 94   AST (SGOT) U/L 17 22   ALT (SGPT) U/L 17 24         BNP        TROPONIN  Results from last 7 days   Lab Units 01/02/21  2018   TROPONIN T ng/mL <0.010       CoAg  Results from last 7 days   Lab Units 01/02/21  0627   INR  0.93   APTT seconds 24.4       Creatinine Clearance  Estimated Creatinine Clearance: 122.6 mL/min (by C-G formula based on SCr of 0.8 mg/dL).    ABG        Radiology  Ct Abdomen Pelvis With Contrast    Result Date: 1/2/2021   1.  Patchy bilateral lower lobe airspace disease, right greater than left favored to be secondary to pneumonia or less likely atelectasis. 2.  Colonic diverticulosis but no evidence of diverticulitis. 3.  No acute process seen within the abdomen or pelvis.  Electronically Signed By-Navjot Quijano MD On:1/2/2021 8:39 AM This report was finalized on [id number] by  Navjot Quijano MD.    Ct Chest Pulmonary Embolism    Result Date: 1/2/2021   1.  No evidence of pulmonary embolus 2.  Patchy bilateral lower lobe airspace disease favored to be secondary to pneumonia or less likely atelectasis.  Electronically Signed By-Navjot Quijano MD On:1/2/2021 8:34 AM This report was finalized on [id number] by  Navjot Quijano MD.              EKG          I personally viewed and interpreted the patient's EKG/Telemetry data: Sinus rhythm premature atrial contractions    ECHOCARDIOGRAM:    Results for orders placed during the hospital encounter of 01/02/21   Transthoracic Echo Complete With Contrast if Necessary Per Protocol    Narrative · Estimated left ventricular EF = 60% Left ventricular systolic function   is normal.     Indications  Chest pain    Technically satisfactory study.  Mitral valve is structurally normal.  Tricuspid valve is  structurally normal.  Aortic valve is structurally normal.  Pulmonic valve could not be well visualized.  No evidence for mitral tricuspid or aortic regurgitation is seen by   Doppler study.  Left atrium is normal in size.  Right atrium is normal in size.  Left ventricle is normal in size and contractility with ejection fraction   of 60%.  Right ventricle is normal in size.  Atrial septum is intact.  Aorta is normal.  No pericardial effusion or intracardiac thrombus is seen.    Impression  Structurally and functionally normal cardiac valves.  Left ventricular size and contractility is normal with ejection fraction   of 60%.           STRESS MYOVIEW:    Cardiolite (Tc-99m Sestamibi) stress test    CARDIAC CATHETERIZATION:            OTHER:         Assessment/Plan     Principal Problem:    Chest pain  Active Problems:    2019-nCoV not detected    Hypertension    Gout    Obstructive sleep apnea    GERD with stricture    ]]]]]]]]]]]]]]]]  Impression  ==========  -Chest pain-possible angina pectoris.  Associated sweating and low blood pressure..  Somewhat pleuritic  Troponin levels are negative.  EKG showed no acute changes    -Elevated D-dimer 1.66  CT scan of the chest negative for pulmonary embolus.    -History of thoracic aortic aneurysm    -History of hypertension gout GERD    -Hypotension in the emergency room that improved.    -History of appendectomy and surgery for pyloric stenosis    -Past history of DVT and melanoma    -Allergic to sulfa and seasonal allergies.    -Non-smoker    -COVID-19 negative    -Family history is positive for COPD and cancer.    =========  Plan  ========  Patient presented with chest pain.  Troponin levels are negative.  EKG showed no acute changes.  Chest pain somewhat pleuritic    Hypotension-better.    Elevated D-dimer  Negative CT scan for pulmonary embolus.    Echocardiogram-normal  Stress Cardiolite test-normal    Lipid panel-normal except for an HDL of 37.    Patient was educated  regarding the test results.  Okay with the discharge plans from cardiac standpoint  Follow-up in the office in 2 to 3 weeks.    Further plan will depend on patient's progress.    ]]]]]]]]]]]]]]]                          Bandar Arrington MD  01/04/21  06:45 EST

## 2021-01-05 NOTE — PROGRESS NOTES
Case Management Discharge Note      Final Note: Home         Selected Continued Care - Discharged on 1/4/2021 Admission date: 1/2/2021 - Discharge disposition: Home or Self Care                 Final Discharge Disposition Code: 01 - home or self-care

## 2021-01-07 LAB
BACTERIA SPEC AEROBE CULT: NORMAL
BACTERIA SPEC AEROBE CULT: NORMAL

## 2024-02-11 ENCOUNTER — APPOINTMENT (OUTPATIENT)
Dept: GENERAL RADIOLOGY | Facility: HOSPITAL | Age: 64
End: 2024-02-11
Payer: COMMERCIAL

## 2024-02-11 ENCOUNTER — HOSPITAL ENCOUNTER (INPATIENT)
Facility: HOSPITAL | Age: 64
LOS: 2 days | Discharge: HOME | End: 2024-02-14
Attending: EMERGENCY MEDICINE | Admitting: PEDIATRICS
Payer: COMMERCIAL

## 2024-02-11 ENCOUNTER — APPOINTMENT (OUTPATIENT)
Dept: CT IMAGING | Facility: HOSPITAL | Age: 64
End: 2024-02-11
Payer: COMMERCIAL

## 2024-02-11 DIAGNOSIS — J11.1 INFLUENZA: ICD-10-CM

## 2024-02-11 DIAGNOSIS — J96.01 ACUTE HYPOXIC RESPIRATORY FAILURE: Primary | ICD-10-CM

## 2024-02-11 PROBLEM — K21.9 GERD (GASTROESOPHAGEAL REFLUX DISEASE): Status: ACTIVE | Noted: 2024-02-11

## 2024-02-11 PROBLEM — R09.02 HYPOXIA: Status: ACTIVE | Noted: 2024-02-11

## 2024-02-11 PROBLEM — J10.1 INFLUENZA A: Status: ACTIVE | Noted: 2024-02-11

## 2024-02-11 PROBLEM — E87.6 HYPOKALEMIA: Status: ACTIVE | Noted: 2024-02-11

## 2024-02-11 PROBLEM — R09.02 HYPOXIA: Status: RESOLVED | Noted: 2024-02-11 | Resolved: 2024-02-11

## 2024-02-11 LAB
ALBUMIN SERPL-MCNC: 4.1 G/DL (ref 3.5–5.2)
ALBUMIN/GLOB SERPL: 1.4 G/DL
ALP SERPL-CCNC: 80 U/L (ref 39–117)
ALT SERPL W P-5'-P-CCNC: 24 U/L (ref 1–41)
ANION GAP SERPL CALCULATED.3IONS-SCNC: 12 MMOL/L (ref 5–15)
AST SERPL-CCNC: 20 U/L (ref 1–40)
BASOPHILS # BLD AUTO: 0.01 10*3/MM3 (ref 0–0.2)
BASOPHILS NFR BLD AUTO: 0.2 % (ref 0–1.5)
BILIRUB SERPL-MCNC: 0.9 MG/DL (ref 0–1.2)
BUN SERPL-MCNC: 16 MG/DL (ref 8–23)
BUN/CREAT SERPL: 17 (ref 7–25)
CALCIUM SPEC-SCNC: 8.9 MG/DL (ref 8.6–10.5)
CHLORIDE SERPL-SCNC: 98 MMOL/L (ref 98–107)
CO2 SERPL-SCNC: 28 MMOL/L (ref 22–29)
CREAT SERPL-MCNC: 0.94 MG/DL (ref 0.76–1.27)
D-LACTATE SERPL-SCNC: 1.5 MMOL/L (ref 0.5–2)
DEPRECATED RDW RBC AUTO: 47.3 FL (ref 37–54)
EGFRCR SERPLBLD CKD-EPI 2021: 90.5 ML/MIN/1.73
EOSINOPHIL # BLD AUTO: 0.04 10*3/MM3 (ref 0–0.4)
EOSINOPHIL NFR BLD AUTO: 0.7 % (ref 0.3–6.2)
ERYTHROCYTE [DISTWIDTH] IN BLOOD BY AUTOMATED COUNT: 14.7 % (ref 12.3–15.4)
FLUAV RNA RESP QL NAA+PROBE: DETECTED
FLUBV RNA RESP QL NAA+PROBE: NOT DETECTED
GLOBULIN UR ELPH-MCNC: 3 GM/DL
GLUCOSE SERPL-MCNC: 124 MG/DL (ref 65–99)
HCT VFR BLD AUTO: 41.2 % (ref 37.5–51)
HGB BLD-MCNC: 13.6 G/DL (ref 13–17.7)
HOLD SPECIMEN: NORMAL
IMM GRANULOCYTES # BLD AUTO: 0.02 10*3/MM3 (ref 0–0.05)
IMM GRANULOCYTES NFR BLD AUTO: 0.3 % (ref 0–0.5)
LYMPHOCYTES # BLD AUTO: 0.19 10*3/MM3 (ref 0.7–3.1)
LYMPHOCYTES NFR BLD AUTO: 3.2 % (ref 19.6–45.3)
MAGNESIUM SERPL-MCNC: 1.6 MG/DL (ref 1.6–2.4)
MCH RBC QN AUTO: 29.2 PG (ref 26.6–33)
MCHC RBC AUTO-ENTMCNC: 33 G/DL (ref 31.5–35.7)
MCV RBC AUTO: 88.6 FL (ref 79–97)
MONOCYTES # BLD AUTO: 0.3 10*3/MM3 (ref 0.1–0.9)
MONOCYTES NFR BLD AUTO: 5 % (ref 5–12)
NEUTROPHILS NFR BLD AUTO: 5.44 10*3/MM3 (ref 1.7–7)
NEUTROPHILS NFR BLD AUTO: 90.6 % (ref 42.7–76)
NRBC BLD AUTO-RTO: 0 /100 WBC (ref 0–0.2)
NT-PROBNP SERPL-MCNC: 120 PG/ML (ref 0–900)
PLATELET # BLD AUTO: 169 10*3/MM3 (ref 140–450)
PMV BLD AUTO: 9.7 FL (ref 6–12)
POTASSIUM SERPL-SCNC: 3.4 MMOL/L (ref 3.5–5.2)
PROCALCITONIN SERPL-MCNC: 0.19 NG/ML (ref 0–0.25)
PROT SERPL-MCNC: 7.1 G/DL (ref 6–8.5)
RBC # BLD AUTO: 4.65 10*6/MM3 (ref 4.14–5.8)
SARS-COV-2 RNA RESP QL NAA+PROBE: NOT DETECTED
SODIUM SERPL-SCNC: 138 MMOL/L (ref 136–145)
TROPONIN T SERPL HS-MCNC: 22 NG/L
TROPONIN T SERPL HS-MCNC: 28 NG/L
WBC NRBC COR # BLD AUTO: 6 10*3/MM3 (ref 3.4–10.8)
WHOLE BLOOD HOLD COAG: NORMAL
WHOLE BLOOD HOLD SPECIMEN: NORMAL

## 2024-02-11 PROCEDURE — 99222 1ST HOSP IP/OBS MODERATE 55: CPT | Performed by: STUDENT IN AN ORGANIZED HEALTH CARE EDUCATION/TRAINING PROGRAM

## 2024-02-11 PROCEDURE — 25510000001 IOPAMIDOL PER 1 ML: Performed by: EMERGENCY MEDICINE

## 2024-02-11 PROCEDURE — 71275 CT ANGIOGRAPHY CHEST: CPT

## 2024-02-11 PROCEDURE — 25810000003 SODIUM CHLORIDE 0.9 % SOLUTION: Performed by: NURSE PRACTITIONER

## 2024-02-11 PROCEDURE — 83605 ASSAY OF LACTIC ACID: CPT | Performed by: EMERGENCY MEDICINE

## 2024-02-11 PROCEDURE — 83880 ASSAY OF NATRIURETIC PEPTIDE: CPT | Performed by: EMERGENCY MEDICINE

## 2024-02-11 PROCEDURE — G0378 HOSPITAL OBSERVATION PER HR: HCPCS

## 2024-02-11 PROCEDURE — 84145 PROCALCITONIN (PCT): CPT | Performed by: EMERGENCY MEDICINE

## 2024-02-11 PROCEDURE — 25010000002 ENOXAPARIN PER 10 MG: Performed by: NURSE PRACTITIONER

## 2024-02-11 PROCEDURE — 84484 ASSAY OF TROPONIN QUANT: CPT | Performed by: STUDENT IN AN ORGANIZED HEALTH CARE EDUCATION/TRAINING PROGRAM

## 2024-02-11 PROCEDURE — 84484 ASSAY OF TROPONIN QUANT: CPT | Performed by: EMERGENCY MEDICINE

## 2024-02-11 PROCEDURE — 93005 ELECTROCARDIOGRAM TRACING: CPT | Performed by: EMERGENCY MEDICINE

## 2024-02-11 PROCEDURE — 99285 EMERGENCY DEPT VISIT HI MDM: CPT

## 2024-02-11 PROCEDURE — 87636 SARSCOV2 & INF A&B AMP PRB: CPT | Performed by: EMERGENCY MEDICINE

## 2024-02-11 PROCEDURE — 85025 COMPLETE CBC W/AUTO DIFF WBC: CPT | Performed by: EMERGENCY MEDICINE

## 2024-02-11 PROCEDURE — 36415 COLL VENOUS BLD VENIPUNCTURE: CPT

## 2024-02-11 PROCEDURE — 83735 ASSAY OF MAGNESIUM: CPT | Performed by: NURSE PRACTITIONER

## 2024-02-11 PROCEDURE — 80053 COMPREHEN METABOLIC PANEL: CPT | Performed by: EMERGENCY MEDICINE

## 2024-02-11 PROCEDURE — 87040 BLOOD CULTURE FOR BACTERIA: CPT | Performed by: EMERGENCY MEDICINE

## 2024-02-11 PROCEDURE — 71045 X-RAY EXAM CHEST 1 VIEW: CPT

## 2024-02-11 RX ORDER — ACETAMINOPHEN 650 MG/1
650 SUPPOSITORY RECTAL EVERY 4 HOURS PRN
Status: DISCONTINUED | OUTPATIENT
Start: 2024-02-11 | End: 2024-02-14 | Stop reason: HOSPADM

## 2024-02-11 RX ORDER — NITROGLYCERIN 0.4 MG/1
0.4 TABLET SUBLINGUAL
Status: DISCONTINUED | OUTPATIENT
Start: 2024-02-11 | End: 2024-02-14 | Stop reason: HOSPADM

## 2024-02-11 RX ORDER — ONDANSETRON 2 MG/ML
4 INJECTION INTRAMUSCULAR; INTRAVENOUS EVERY 6 HOURS PRN
Status: DISCONTINUED | OUTPATIENT
Start: 2024-02-11 | End: 2024-02-14 | Stop reason: HOSPADM

## 2024-02-11 RX ORDER — ALLOPURINOL 300 MG/1
300 TABLET ORAL DAILY
Status: DISCONTINUED | OUTPATIENT
Start: 2024-02-12 | End: 2024-02-14 | Stop reason: HOSPADM

## 2024-02-11 RX ORDER — AMLODIPINE BESYLATE 2.5 MG/1
2.5 TABLET ORAL DAILY
COMMUNITY

## 2024-02-11 RX ORDER — OSELTAMIVIR PHOSPHATE 75 MG/1
75 CAPSULE ORAL EVERY 12 HOURS SCHEDULED
Status: DISCONTINUED | OUTPATIENT
Start: 2024-02-11 | End: 2024-02-14 | Stop reason: HOSPADM

## 2024-02-11 RX ORDER — SODIUM CHLORIDE 9 MG/ML
40 INJECTION, SOLUTION INTRAVENOUS AS NEEDED
Status: DISCONTINUED | OUTPATIENT
Start: 2024-02-11 | End: 2024-02-14 | Stop reason: HOSPADM

## 2024-02-11 RX ORDER — ACETAMINOPHEN 325 MG/1
650 TABLET ORAL EVERY 4 HOURS PRN
Status: DISCONTINUED | OUTPATIENT
Start: 2024-02-11 | End: 2024-02-14 | Stop reason: HOSPADM

## 2024-02-11 RX ORDER — BISACODYL 10 MG
10 SUPPOSITORY, RECTAL RECTAL DAILY PRN
Status: DISCONTINUED | OUTPATIENT
Start: 2024-02-11 | End: 2024-02-14 | Stop reason: HOSPADM

## 2024-02-11 RX ORDER — ONDANSETRON 4 MG/1
4 TABLET, ORALLY DISINTEGRATING ORAL EVERY 6 HOURS PRN
Status: DISCONTINUED | OUTPATIENT
Start: 2024-02-11 | End: 2024-02-14 | Stop reason: HOSPADM

## 2024-02-11 RX ORDER — AMOXICILLIN 250 MG
2 CAPSULE ORAL 2 TIMES DAILY PRN
Status: DISCONTINUED | OUTPATIENT
Start: 2024-02-11 | End: 2024-02-14 | Stop reason: HOSPADM

## 2024-02-11 RX ORDER — AMLODIPINE BESYLATE 5 MG/1
2.5 TABLET ORAL DAILY
Status: DISCONTINUED | OUTPATIENT
Start: 2024-02-12 | End: 2024-02-12

## 2024-02-11 RX ORDER — ACETAMINOPHEN 325 MG/1
650 TABLET ORAL EVERY 6 HOURS PRN
Status: DISCONTINUED | OUTPATIENT
Start: 2024-02-11 | End: 2024-02-11

## 2024-02-11 RX ORDER — SODIUM CHLORIDE 0.9 % (FLUSH) 0.9 %
10 SYRINGE (ML) INJECTION AS NEEDED
Status: DISCONTINUED | OUTPATIENT
Start: 2024-02-11 | End: 2024-02-14 | Stop reason: HOSPADM

## 2024-02-11 RX ORDER — SODIUM CHLORIDE 0.9 % (FLUSH) 0.9 %
10 SYRINGE (ML) INJECTION EVERY 12 HOURS SCHEDULED
Status: DISCONTINUED | OUTPATIENT
Start: 2024-02-11 | End: 2024-02-14 | Stop reason: HOSPADM

## 2024-02-11 RX ORDER — PANTOPRAZOLE SODIUM 40 MG/1
40 TABLET, DELAYED RELEASE ORAL
Status: DISCONTINUED | OUTPATIENT
Start: 2024-02-12 | End: 2024-02-14 | Stop reason: HOSPADM

## 2024-02-11 RX ORDER — BISACODYL 5 MG/1
5 TABLET, DELAYED RELEASE ORAL DAILY PRN
Status: DISCONTINUED | OUTPATIENT
Start: 2024-02-11 | End: 2024-02-14 | Stop reason: HOSPADM

## 2024-02-11 RX ORDER — MONTELUKAST SODIUM 10 MG/1
10 TABLET ORAL NIGHTLY
COMMUNITY

## 2024-02-11 RX ORDER — ACETAMINOPHEN 160 MG/5ML
650 SOLUTION ORAL EVERY 4 HOURS PRN
Status: DISCONTINUED | OUTPATIENT
Start: 2024-02-11 | End: 2024-02-14 | Stop reason: HOSPADM

## 2024-02-11 RX ORDER — CYCLOBENZAPRINE HCL 10 MG
10 TABLET ORAL 3 TIMES DAILY PRN
COMMUNITY

## 2024-02-11 RX ORDER — SODIUM CHLORIDE 9 MG/ML
100 INJECTION, SOLUTION INTRAVENOUS CONTINUOUS
Status: DISCONTINUED | OUTPATIENT
Start: 2024-02-11 | End: 2024-02-12

## 2024-02-11 RX ORDER — IPRATROPIUM BROMIDE AND ALBUTEROL SULFATE 2.5; .5 MG/3ML; MG/3ML
3 SOLUTION RESPIRATORY (INHALATION)
Status: DISCONTINUED | OUTPATIENT
Start: 2024-02-11 | End: 2024-02-14 | Stop reason: HOSPADM

## 2024-02-11 RX ORDER — ENOXAPARIN SODIUM 100 MG/ML
40 INJECTION SUBCUTANEOUS DAILY
Status: DISCONTINUED | OUTPATIENT
Start: 2024-02-11 | End: 2024-02-14 | Stop reason: HOSPADM

## 2024-02-11 RX ORDER — POTASSIUM CHLORIDE 20 MEQ/1
40 TABLET, EXTENDED RELEASE ORAL EVERY 4 HOURS
Status: COMPLETED | OUTPATIENT
Start: 2024-02-11 | End: 2024-02-12

## 2024-02-11 RX ORDER — IPRATROPIUM BROMIDE AND ALBUTEROL SULFATE 2.5; .5 MG/3ML; MG/3ML
3 SOLUTION RESPIRATORY (INHALATION) EVERY 4 HOURS PRN
Status: DISCONTINUED | OUTPATIENT
Start: 2024-02-11 | End: 2024-02-14 | Stop reason: HOSPADM

## 2024-02-11 RX ORDER — MONTELUKAST SODIUM 10 MG/1
10 TABLET ORAL DAILY
Status: DISCONTINUED | OUTPATIENT
Start: 2024-02-12 | End: 2024-02-14 | Stop reason: HOSPADM

## 2024-02-11 RX ORDER — FLUTICASONE PROPIONATE 50 MCG
1 SPRAY, SUSPENSION (ML) NASAL AS NEEDED
Status: DISCONTINUED | OUTPATIENT
Start: 2024-02-11 | End: 2024-02-14 | Stop reason: HOSPADM

## 2024-02-11 RX ORDER — POLYETHYLENE GLYCOL 3350 17 G/17G
17 POWDER, FOR SOLUTION ORAL DAILY PRN
Status: DISCONTINUED | OUTPATIENT
Start: 2024-02-11 | End: 2024-02-14 | Stop reason: HOSPADM

## 2024-02-11 RX ORDER — HYDROCHLOROTHIAZIDE 25 MG/1
25 TABLET ORAL DAILY
COMMUNITY

## 2024-02-11 RX ADMIN — Medication 10 ML: at 22:26

## 2024-02-11 RX ADMIN — POTASSIUM CHLORIDE 40 MEQ: 1500 TABLET, EXTENDED RELEASE ORAL at 21:48

## 2024-02-11 RX ADMIN — IOPAMIDOL 85 ML: 755 INJECTION, SOLUTION INTRAVENOUS at 18:34

## 2024-02-11 RX ADMIN — ACETAMINOPHEN 650 MG: 325 TABLET ORAL at 22:25

## 2024-02-11 RX ADMIN — ENOXAPARIN SODIUM 40 MG: 100 INJECTION SUBCUTANEOUS at 22:26

## 2024-02-11 RX ADMIN — OSELTAMIVIR PHOSPHATE 75 MG: 75 CAPSULE ORAL at 22:25

## 2024-02-11 RX ADMIN — SODIUM CHLORIDE 100 ML/HR: 9 INJECTION, SOLUTION INTRAVENOUS at 21:22

## 2024-02-11 NOTE — ED PROVIDER NOTES
[facility]    EMERGENCY DEPARTMENT ENCOUNTER      Pt Name: Nile Chin  MRN: [id number]  YOB: 1960  Date of evaluation: 2/11/2024  Provider: Joseluis Sahu MD    CHIEF COMPLAINT       Chief Complaint   Patient presents with    Fever         HISTORY OF PRESENT ILLNESS   Nile Chin is a 63 y.o. male who presents to the emergency department with complaint of fever, cough, shortness of breath over the last few days.  Patient denies any associated chest pain, abdominal pain, vomiting, diarrhea, or urinary symptoms.  He has no prior history of cardiopulmonary disease.      Nursing notes were reviewed.    REVIEW OF SYSTEMS     ROS:  A chief complaint appropriate review of systems was completed and is negative except as noted in the HPI.      PAST MEDICAL HISTORY     Past Medical History:   Diagnosis Date    Allergies     GERD (gastroesophageal reflux disease)     Gout     Hypertension     Melanoma     Pyloric stenosis          SURGICAL HISTORY       Past Surgical History:   Procedure Laterality Date    APPENDECTOMY           CURRENT MEDICATIONS       Current Facility-Administered Medications:     acetaminophen (TYLENOL) tablet 650 mg, 650 mg, Oral, Q4H PRN, 650 mg at 02/11/24 2225 **OR** acetaminophen (TYLENOL) 160 MG/5ML oral solution 650 mg, 650 mg, Oral, Q4H PRN **OR** acetaminophen (TYLENOL) suppository 650 mg, 650 mg, Rectal, Q4H PRN, Harika, Griselda, APRN    [START ON 2/12/2024] allopurinol (ZYLOPRIM) tablet 300 mg, 300 mg, Oral, Daily, Harika, Griselda, APRN    sennosides-docusate (PERICOLACE) 8.6-50 MG per tablet 2 tablet, 2 tablet, Oral, BID PRN **AND** polyethylene glycol (MIRALAX) packet 17 g, 17 g, Oral, Daily PRN **AND** bisacodyl (DULCOLAX) EC tablet 5 mg, 5 mg, Oral, Daily PRN **AND** bisacodyl (DULCOLAX) suppository 10 mg, 10 mg, Rectal, Daily PRN, Harika, Griselda, APRN    Calcium Replacement - Follow Nurse / BPA Driven Protocol, , Does not apply, PRN, Harika, Griselda, APRN     Enoxaparin Sodium (LOVENOX) syringe 40 mg, 40 mg, Subcutaneous, Daily, Harika, Griselda, APRN, 40 mg at 02/11/24 2226    fluticasone (FLONASE) 50 MCG/ACT nasal spray 1 spray, 1 spray, Each Nare, PRN, Harika, Griselda, APRN    ipratropium-albuterol (DUO-NEB) nebulizer solution 3 mL, 3 mL, Nebulization, Q4H PRN, Harika, Griselda, APRN    ipratropium-albuterol (DUO-NEB) nebulizer solution 3 mL, 3 mL, Nebulization, 4x Daily - RT, Harika, Griselda, APRN    Magnesium Standard Dose Replacement - Follow Nurse / BPA Driven Protocol, , Does not apply, PRN, Harika, Griselda, APRN    [START ON 2/12/2024] montelukast (SINGULAIR) tablet 10 mg, 10 mg, Oral, Daily, Harika, Griselda, APRN    nitroglycerin (NITROSTAT) SL tablet 0.4 mg, 0.4 mg, Sublingual, Q5 Min PRN, Harika, Griselda, APRN    ondansetron ODT (ZOFRAN-ODT) disintegrating tablet 4 mg, 4 mg, Oral, Q6H PRN **OR** ondansetron (ZOFRAN) injection 4 mg, 4 mg, Intravenous, Q6H PRN, Harika, Griselda, APRN    oseltamivir (TAMIFLU) capsule 75 mg, 75 mg, Oral, Q12H, Harika, Griselda, APRN, 75 mg at 02/11/24 2225    [START ON 2/12/2024] pantoprazole (PROTONIX) EC tablet 40 mg, 40 mg, Oral, Q AM, Harika, Griselda, APRN    Phosphorus Replacement - Follow Nurse / BPA Driven Protocol, , Does not apply, PRN, Harika, Griselda, APRN    potassium chloride (K-DUR,KLOR-CON) CR tablet 40 mEq, 40 mEq, Oral, Q4H, MarcTonya macias, DO, 40 mEq at 02/11/24 2148    Potassium Replacement - Follow Nurse / BPA Driven Protocol, , Does not apply, PRN, Harika, Griselda, APRN    sodium chloride 0.9 % flush 10 mL, 10 mL, Intravenous, PRN, Joseluis Sahu MD    sodium chloride 0.9 % flush 10 mL, 10 mL, Intravenous, Q12H, Harika, Griselda, APRN, 10 mL at 02/11/24 2226    sodium chloride 0.9 % flush 10 mL, 10 mL, Intravenous, PRN, Harika, Griselda, APRN    sodium chloride 0.9 % infusion 40 mL, 40 mL, Intravenous, PRN, Harika, Griselda, APRN    sodium chloride 0.9 % infusion, 100 mL/hr, Intravenous,  Continuous, Harika, Griselda, APRN, Last Rate: 100 mL/hr at 02/11/24 2122, 100 mL/hr at 02/11/24 2122    ALLERGIES     Other, Sulfa antibiotics, and Suture    FAMILY HISTORY       Family History   Problem Relation Age of Onset    COPD Mother     Cancer Father           SOCIAL HISTORY       Social History     Socioeconomic History    Marital status:    Tobacco Use    Smoking status: Never    Smokeless tobacco: Never   Vaping Use    Vaping Use: Never used   Substance and Sexual Activity    Alcohol use: No    Drug use: No    Sexual activity: Defer         PHYSICAL EXAM    (up to 7 for level 4, 8 or more for level 5)     Vitals:    02/11/24 2002 02/11/24 2032 02/11/24 2100 02/11/24 2201   BP: 120/69 120/77 100/54 139/85   BP Location:    Left arm   Patient Position:    Lying   Pulse: 94 90 91 97   Resp:    20   Temp:    (!) 100.7 °F (38.2 °C)   TempSrc:    Oral   SpO2: 91% 93% 95% 94%   Weight:    112 kg (247 lb 9.6 oz)   Height:           General: Awake, alert, no acute distress.  HEENT: Conjunctivae normal.  Neck: Trachea midline.  Cardiac: Heart regular rate, rhythm, no murmurs, rubs, or gallops  Lungs: Lungs are clear to auscultation, there is no wheezing, rhonchi, or rales. There is no use of accessory muscles.  Chest wall: There is no tenderness to palpation over the chest wall or over ribs  Abdomen: Abdomen is soft, nontender, nondistended. There are no firm or pulsatile masses, no rebound rigidity or guarding.   Musculoskeletal: No deformity.  Neuro: Alert and oriented x 4.  Dermatology: Skin is warm and dry  Psych: Mentation is grossly normal, cognition is grossly normal. Affect is appropriate.        DIAGNOSTIC RESULTS     EKG: All EKGs are interpreted by the Emergency Department Physician who either signs or Co-signs this chart in the absence of a cardiologist.    ECG 12 Lead ED Triage Standing Order; SOA   Preliminary Result   Test Reason : ED Triage Standing Order~   Blood Pressure :   */*   mmHG    Vent. Rate :  89 BPM     Atrial Rate :  89 BPM      P-R Int : 154 ms          QRS Dur : 150 ms       QT Int : 404 ms       P-R-T Axes :  58   6  23 degrees      QTc Int : 491 ms      Normal sinus rhythm   Possible Left atrial enlargement   Right bundle branch block   Abnormal ECG   No previous ECGs available      Referred By: EDMD           Confirmed By:             RADIOLOGY:   [x] Radiologist's Report Reviewed:  CT Angiogram Chest   Final Result   No pulmonary embolus. No acute cardiopulmonary disease.         Electronically Signed: Nile Cruz MD     2/11/2024 6:39 PM EST     Workstation ID: [id number]      XR Chest 1 View   Final Result   Impression:   1.No definite acute pulmonary process.   2.Some elevation right hemidiaphragm which has been suggested.         Electronically Signed: Vu Goyal MD     2/11/2024 4:22 PM EST     Workstation ID: [id number]          I ordered and independently reviewed the above noted radiographic studies.        LABS:    I have reviewed and interpreted all of the currently available lab results from this visit (if applicable):  Results for orders placed or performed during the hospital encounter of 02/11/24   COVID-19 and FLU A/B PCR, 1 HR TAT - Swab, Nasopharynx    Specimen: Nasopharynx; Swab   Result Value Ref Range    COVID19 Not Detected Not Detected - Ref. Range    Influenza A PCR Detected (A) Not Detected    Influenza B PCR Not Detected Not Detected   Comprehensive Metabolic Panel    Specimen: Blood   Result Value Ref Range    Glucose 124 (H) 65 - 99 mg/dL    BUN 16 8 - 23 mg/dL    Creatinine 0.94 0.76 - 1.27 mg/dL    Sodium 138 136 - 145 mmol/L    Potassium 3.4 (L) 3.5 - 5.2 mmol/L    Chloride 98 98 - 107 mmol/L    CO2 28.0 22.0 - 29.0 mmol/L    Calcium 8.9 8.6 - 10.5 mg/dL    Total Protein 7.1 6.0 - 8.5 g/dL    Albumin 4.1 3.5 - 5.2 g/dL    ALT (SGPT) 24 1 - 41 U/L    AST (SGOT) 20 1 - 40 U/L    Alkaline Phosphatase 80 39 - 117 U/L    Total Bilirubin 0.9 0.0 - 1.2 mg/dL     Globulin 3.0 gm/dL    A/G Ratio 1.4 g/dL    BUN/Creatinine Ratio 17.0 7.0 - 25.0    Anion Gap 12.0 5.0 - 15.0 mmol/L    eGFR 90.5 >60.0 mL/min/1.73   BNP    Specimen: Blood   Result Value Ref Range    proBNP 120.0 0.0 - 900.0 pg/mL   Single High Sensitivity Troponin T    Specimen: Blood   Result Value Ref Range    HS Troponin T 28 (H) <22 ng/L   CBC Auto Differential    Specimen: Blood   Result Value Ref Range    WBC 6.00 3.40 - 10.80 10*3/mm3    RBC 4.65 4.14 - 5.80 10*6/mm3    Hemoglobin 13.6 13.0 - 17.7 g/dL    Hematocrit 41.2 37.5 - 51.0 %    MCV 88.6 79.0 - 97.0 fL    MCH 29.2 26.6 - 33.0 pg    MCHC 33.0 31.5 - 35.7 g/dL    RDW 14.7 12.3 - 15.4 %    RDW-SD 47.3 37.0 - 54.0 fl    MPV 9.7 6.0 - 12.0 fL    Platelets 169 140 - 450 10*3/mm3    Neutrophil % 90.6 (H) 42.7 - 76.0 %    Lymphocyte % 3.2 (L) 19.6 - 45.3 %    Monocyte % 5.0 5.0 - 12.0 %    Eosinophil % 0.7 0.3 - 6.2 %    Basophil % 0.2 0.0 - 1.5 %    Immature Grans % 0.3 0.0 - 0.5 %    Neutrophils, Absolute 5.44 1.70 - 7.00 10*3/mm3    Lymphocytes, Absolute 0.19 (L) 0.70 - 3.10 10*3/mm3    Monocytes, Absolute 0.30 0.10 - 0.90 10*3/mm3    Eosinophils, Absolute 0.04 0.00 - 0.40 10*3/mm3    Basophils, Absolute 0.01 0.00 - 0.20 10*3/mm3    Immature Grans, Absolute 0.02 0.00 - 0.05 10*3/mm3    nRBC 0.0 0.0 - 0.2 /100 WBC   Lactic Acid, Plasma    Specimen: Blood   Result Value Ref Range    Lactate 1.5 0.5 - 2.0 mmol/L   Procalcitonin    Specimen: Blood   Result Value Ref Range    Procalcitonin 0.19 0.00 - 0.25 ng/mL   Magnesium    Specimen: Blood   Result Value Ref Range    Magnesium 1.6 1.6 - 2.4 mg/dL   ECG 12 Lead ED Triage Standing Order; SOA   Result Value Ref Range    QT Interval 404 ms    QTC Interval 491 ms   Green Top (Gel)   Result Value Ref Range    Extra Tube Hold for add-ons.    Lavender Top   Result Value Ref Range    Extra Tube hold for add-on    Gold Top - SST   Result Value Ref Range    Extra Tube Hold for add-ons.    Gray Top   Result Value  Ref Range    Extra Tube Hold for add-ons.    Light Blue Top   Result Value Ref Range    Extra Tube Hold for add-ons.         If labs were ordered, I independently reviewed the results and considered them in treating the patient.      EMERGENCY DEPARTMENT COURSE and DIFFERENTIAL DIAGNOSIS/MDM:   Vitals:  AS OF 22:36 EST    BP - 139/85  HR - 97  TEMP - (!) 100.7 °F (38.2 °C) (Oral)  O2 SATS - 94%        Discussion below represents my analysis of pertinent findings related to patient's condition, differential diagnosis, treatment plan and final disposition.      Differential diagnosis:  The differential diagnosis associated with the patient's presentation includes: Pneumonia, influenza, COVID-19, PE, pneumothorax, ACS, dysrhythmia, anemia      Independent interpretations (ECG/rhythm strip/X-ray/US/CT scan): I independently interpreted the patient's CTA chest and cardiac monitor.  There is no evidence of pulmonary embolism and the patient is in sinus rhythm.      Patient's care impacted by:   [] Diabetes   [x] Hypertension   [] Coronary Artery Disease   [] Cancer   [] Other:     Care significantly affected by Social Determinants of Health (housing and economic circumstances, unemployment)    [] Yes     [x] No   If yes, Patient's care significantly limited by  Social Determinants of Health including:    [] Inadequate housing    [] Low income    [] Alcoholism and drug addiction in family    [] Problems related to primary support group    [] Unemployment    [] Problems related to employment    [] Other Social Determinants of Health:       Consideration of admission/observation vs discharge: Patient presents with acute hypoxia and requires admission      I considered prescription management with:    [] Pain medication:   [] Antiviral:   [x] Antibiotic: Considered IV antibiotics, however the patient's presentation is viral in nature   [] Other:    ED Course:           I discussed with hospitalist Dr. Koehler.  Discussed  history, presentation, workup.  Accepts patient for admission.    I had a discussion with the patient/family regarding diagnosis, diagnostic results, treatment plan, and medications.  The patient/family indicated understanding of these instructions.  I spent adequate time at the bedside preceding discharge necessary to personally discuss the aftercare instructions, giving patient education, providing explanations of the results of our evaluations/findings, and my decision making to assure that the patient/family understand the plan of care.  Time was allotted to answer questions at that time and throughout the ED course.  Emphasis was placed on timely follow-up after discharge.  I also discussed the potential for the development of an acute emergent condition requiring further evaluation, admission, or even surgical intervention. I discussed that we found nothing during the visit today indicating the need for further workup, admission, or the presence of an unstable medical condition.  I encouraged the patient to return to the emergency department immediately for ANY concerns, worsening, new complaints, or if symptoms persist and unable to seek follow-up in a timely fashion.  The patient/family expressed understanding and agreement with this plan.  The patient will follow-up with their PCP in 1-2 days for reevaluation.           PROCEDURES:  Procedures    CRITICAL CARE TIME    Approximately 35 minutes of discontinuous critical care time was provided to this patient by myself absent of any time spent performing procedures.  Patient presents critically ill with acute hypoxia secondary to influenza placed in the cardiovascular, respiratory, neurologic systems at risk requiring the following interventions: Supplemental oxygen, interpretation of lab/ECG/imaging, frequent reassessment, coordination admission with the following response: Resolution of hypoxia.  Patient at high risk of deterioration and possibly death  without these interventions.      FINAL IMPRESSION      1. Acute hypoxic respiratory failure    2. Influenza          DISPOSITION/PLAN     ED Disposition       ED Disposition   Decision to Admit    Condition   --    Comment   Level of Care: Telemetry [5]   Diagnosis: Acute respiratory failure with hypoxia [784900]                   Comment: Please note this report has been produced using speech recognition software.      Joseluis Sahu MD  Attending Emergency Physician             Joseluis Sahu MD  02/11/24 2249

## 2024-02-12 LAB
ANION GAP SERPL CALCULATED.3IONS-SCNC: 9 MMOL/L (ref 5–15)
BASOPHILS # BLD AUTO: 0.02 10*3/MM3 (ref 0–0.2)
BASOPHILS NFR BLD AUTO: 0.4 % (ref 0–1.5)
BUN SERPL-MCNC: 16 MG/DL (ref 8–23)
BUN/CREAT SERPL: 21.9 (ref 7–25)
CALCIUM SPEC-SCNC: 8.5 MG/DL (ref 8.6–10.5)
CHLORIDE SERPL-SCNC: 101 MMOL/L (ref 98–107)
CO2 SERPL-SCNC: 28 MMOL/L (ref 22–29)
CREAT SERPL-MCNC: 0.73 MG/DL (ref 0.76–1.27)
DEPRECATED RDW RBC AUTO: 48.6 FL (ref 37–54)
EGFRCR SERPLBLD CKD-EPI 2021: 101.6 ML/MIN/1.73
EOSINOPHIL # BLD AUTO: 0.03 10*3/MM3 (ref 0–0.4)
EOSINOPHIL NFR BLD AUTO: 0.6 % (ref 0.3–6.2)
ERYTHROCYTE [DISTWIDTH] IN BLOOD BY AUTOMATED COUNT: 14.9 % (ref 12.3–15.4)
GEN 5 2HR TROPONIN T REFLEX: 24 NG/L
GLUCOSE SERPL-MCNC: 133 MG/DL (ref 65–99)
HCT VFR BLD AUTO: 37.9 % (ref 37.5–51)
HGB BLD-MCNC: 12.6 G/DL (ref 13–17.7)
IMM GRANULOCYTES # BLD AUTO: 0.02 10*3/MM3 (ref 0–0.05)
IMM GRANULOCYTES NFR BLD AUTO: 0.4 % (ref 0–0.5)
LYMPHOCYTES # BLD AUTO: 0.26 10*3/MM3 (ref 0.7–3.1)
LYMPHOCYTES NFR BLD AUTO: 5.6 % (ref 19.6–45.3)
MCH RBC QN AUTO: 29.8 PG (ref 26.6–33)
MCHC RBC AUTO-ENTMCNC: 33.2 G/DL (ref 31.5–35.7)
MCV RBC AUTO: 89.6 FL (ref 79–97)
MONOCYTES # BLD AUTO: 0.37 10*3/MM3 (ref 0.1–0.9)
MONOCYTES NFR BLD AUTO: 7.9 % (ref 5–12)
NEUTROPHILS NFR BLD AUTO: 3.98 10*3/MM3 (ref 1.7–7)
NEUTROPHILS NFR BLD AUTO: 85.1 % (ref 42.7–76)
NRBC BLD AUTO-RTO: 0 /100 WBC (ref 0–0.2)
PLATELET # BLD AUTO: 144 10*3/MM3 (ref 140–450)
PMV BLD AUTO: 10.2 FL (ref 6–12)
POTASSIUM SERPL-SCNC: 3.8 MMOL/L (ref 3.5–5.2)
QT INTERVAL: 404 MS
QTC INTERVAL: 491 MS
RBC # BLD AUTO: 4.23 10*6/MM3 (ref 4.14–5.8)
SODIUM SERPL-SCNC: 138 MMOL/L (ref 136–145)
TROPONIN T DELTA: 2 NG/L
WBC NRBC COR # BLD AUTO: 4.68 10*3/MM3 (ref 3.4–10.8)

## 2024-02-12 PROCEDURE — 85025 COMPLETE CBC W/AUTO DIFF WBC: CPT | Performed by: NURSE PRACTITIONER

## 2024-02-12 PROCEDURE — 94799 UNLISTED PULMONARY SVC/PX: CPT

## 2024-02-12 PROCEDURE — 80048 BASIC METABOLIC PNL TOTAL CA: CPT | Performed by: NURSE PRACTITIONER

## 2024-02-12 PROCEDURE — 94660 CPAP INITIATION&MGMT: CPT

## 2024-02-12 PROCEDURE — 94761 N-INVAS EAR/PLS OXIMETRY MLT: CPT

## 2024-02-12 PROCEDURE — 99232 SBSQ HOSP IP/OBS MODERATE 35: CPT | Performed by: PEDIATRICS

## 2024-02-12 PROCEDURE — 25810000003 SODIUM CHLORIDE 0.9 % SOLUTION: Performed by: NURSE PRACTITIONER

## 2024-02-12 PROCEDURE — 94664 DEMO&/EVAL PT USE INHALER: CPT

## 2024-02-12 PROCEDURE — 25010000002 ENOXAPARIN PER 10 MG: Performed by: NURSE PRACTITIONER

## 2024-02-12 PROCEDURE — 94640 AIRWAY INHALATION TREATMENT: CPT

## 2024-02-12 PROCEDURE — 84484 ASSAY OF TROPONIN QUANT: CPT | Performed by: STUDENT IN AN ORGANIZED HEALTH CARE EDUCATION/TRAINING PROGRAM

## 2024-02-12 RX ORDER — SODIUM CHLORIDE 9 MG/ML
100 INJECTION, SOLUTION INTRAVENOUS CONTINUOUS
Status: DISCONTINUED | OUTPATIENT
Start: 2024-02-12 | End: 2024-02-12

## 2024-02-12 RX ORDER — SODIUM CHLORIDE 9 MG/ML
100 INJECTION, SOLUTION INTRAVENOUS CONTINUOUS
Status: ACTIVE | OUTPATIENT
Start: 2024-02-12 | End: 2024-02-12

## 2024-02-12 RX ADMIN — ALLOPURINOL 300 MG: 300 TABLET ORAL at 08:01

## 2024-02-12 RX ADMIN — OSELTAMIVIR PHOSPHATE 75 MG: 75 CAPSULE ORAL at 22:54

## 2024-02-12 RX ADMIN — POTASSIUM CHLORIDE 40 MEQ: 1500 TABLET, EXTENDED RELEASE ORAL at 04:02

## 2024-02-12 RX ADMIN — OSELTAMIVIR PHOSPHATE 75 MG: 75 CAPSULE ORAL at 08:01

## 2024-02-12 RX ADMIN — PANTOPRAZOLE SODIUM 40 MG: 40 TABLET, DELAYED RELEASE ORAL at 05:00

## 2024-02-12 RX ADMIN — IPRATROPIUM BROMIDE AND ALBUTEROL SULFATE 3 ML: 2.5; .5 SOLUTION RESPIRATORY (INHALATION) at 16:55

## 2024-02-12 RX ADMIN — MONTELUKAST 10 MG: 10 TABLET, FILM COATED ORAL at 08:01

## 2024-02-12 RX ADMIN — ACETAMINOPHEN 650 MG: 325 TABLET ORAL at 04:02

## 2024-02-12 RX ADMIN — ACETAMINOPHEN 650 MG: 325 TABLET ORAL at 12:03

## 2024-02-12 RX ADMIN — IPRATROPIUM BROMIDE AND ALBUTEROL SULFATE 3 ML: 2.5; .5 SOLUTION RESPIRATORY (INHALATION) at 08:17

## 2024-02-12 RX ADMIN — IPRATROPIUM BROMIDE AND ALBUTEROL SULFATE 3 ML: 2.5; .5 SOLUTION RESPIRATORY (INHALATION) at 00:38

## 2024-02-12 RX ADMIN — Medication 10 ML: at 08:02

## 2024-02-12 RX ADMIN — ENOXAPARIN SODIUM 40 MG: 100 INJECTION SUBCUTANEOUS at 22:54

## 2024-02-12 RX ADMIN — SODIUM CHLORIDE 100 ML/HR: 9 INJECTION, SOLUTION INTRAVENOUS at 04:59

## 2024-02-12 RX ADMIN — IPRATROPIUM BROMIDE AND ALBUTEROL SULFATE 3 ML: 2.5; .5 SOLUTION RESPIRATORY (INHALATION) at 13:22

## 2024-02-12 RX ADMIN — IPRATROPIUM BROMIDE AND ALBUTEROL SULFATE 3 ML: 2.5; .5 SOLUTION RESPIRATORY (INHALATION) at 20:32

## 2024-02-12 NOTE — PLAN OF CARE
Problem: Adult Inpatient Plan of Care  Goal: Plan of Care Review  Outcome: Ongoing, Progressing  Goal: Patient-Specific Goal (Individualized)  Outcome: Ongoing, Progressing  Goal: Absence of Hospital-Acquired Illness or Injury  Outcome: Ongoing, Progressing  Intervention: Identify and Manage Fall Risk  Recent Flowsheet Documentation  Taken 2/12/2024 1800 by Micheal Webb RN  Safety Promotion/Fall Prevention:   safety round/check completed   room organization consistent   nonskid shoes/slippers when out of bed   lighting adjusted   fall prevention program maintained   assistive device/personal items within reach   clutter free environment maintained   activity supervised  Taken 2/12/2024 1600 by Micheal eWbb RN  Safety Promotion/Fall Prevention:   safety round/check completed   room organization consistent   nonskid shoes/slippers when out of bed   lighting adjusted   fall prevention program maintained   assistive device/personal items within reach   clutter free environment maintained   activity supervised  Taken 2/12/2024 1400 by Micheal Webb, RN  Safety Promotion/Fall Prevention:   safety round/check completed   room organization consistent   nonskid shoes/slippers when out of bed   lighting adjusted   fall prevention program maintained   clutter free environment maintained   assistive device/personal items within reach   activity supervised  Taken 2/12/2024 1200 by Micheal Webb, RN  Safety Promotion/Fall Prevention:   safety round/check completed   room organization consistent   nonskid shoes/slippers when out of bed   lighting adjusted   fall prevention program maintained   assistive device/personal items within reach   clutter free environment maintained   activity supervised  Taken 2/12/2024 1000 by Micheal Webb, RN  Safety Promotion/Fall Prevention:   safety round/check completed   room organization consistent   nonskid shoes/slippers when out of bed   lighting adjusted   fall prevention  program maintained   clutter free environment maintained   assistive device/personal items within reach   activity supervised  Taken 2/12/2024 0800 by Micheal Webb RN  Safety Promotion/Fall Prevention:   safety round/check completed   room organization consistent   nonskid shoes/slippers when out of bed   lighting adjusted   fall prevention program maintained   clutter free environment maintained   assistive device/personal items within reach   activity supervised  Intervention: Prevent Skin Injury  Recent Flowsheet Documentation  Taken 2/12/2024 1800 by Micheal Webb RN  Body Position: position changed independently  Skin Protection:   adhesive use limited   tubing/devices free from skin contact   transparent dressing maintained  Taken 2/12/2024 1600 by Micheal Webb RN  Body Position: position changed independently  Skin Protection:   adhesive use limited   transparent dressing maintained   tubing/devices free from skin contact  Taken 2/12/2024 1400 by Micheal Webb RN  Body Position: position changed independently  Skin Protection:   adhesive use limited   tubing/devices free from skin contact   transparent dressing maintained  Taken 2/12/2024 1200 by Micheal Webb RN  Body Position: position changed independently  Skin Protection:   adhesive use limited   tubing/devices free from skin contact   transparent dressing maintained  Taken 2/12/2024 1000 by iMcheal Webb RN  Body Position: position changed independently  Skin Protection:   adhesive use limited   tubing/devices free from skin contact   transparent dressing maintained  Taken 2/12/2024 0800 by Micheal Webb RN  Body Position: position changed independently  Skin Protection:   adhesive use limited   tubing/devices free from skin contact   transparent dressing maintained  Intervention: Prevent and Manage VTE (Venous Thromboembolism) Risk  Recent Flowsheet Documentation  Taken 2/12/2024 1800 by Micheal Webb RN  Activity Management:  activity encouraged  Taken 2/12/2024 1600 by Micheal Webb RN  Activity Management: activity encouraged  Taken 2/12/2024 1400 by Micheal Webb RN  Activity Management: activity encouraged  Taken 2/12/2024 1200 by Micheal Webb RN  Activity Management: activity encouraged  Taken 2/12/2024 1000 by Micheal Webb RN  Activity Management: activity encouraged  Taken 2/12/2024 0800 by Micheal Webb RN  Activity Management: activity encouraged  Goal: Optimal Comfort and Wellbeing  Outcome: Ongoing, Progressing  Intervention: Provide Person-Centered Care  Recent Flowsheet Documentation  Taken 2/12/2024 1800 by Micheal Webb RN  Trust Relationship/Rapport: care explained  Taken 2/12/2024 1600 by Micheal Webb RN  Trust Relationship/Rapport: care explained  Taken 2/12/2024 1400 by Micheal Webb RN  Trust Relationship/Rapport: care explained  Taken 2/12/2024 1200 by Micheal Webb RN  Trust Relationship/Rapport: care explained  Taken 2/12/2024 1000 by Micheal Webb RN  Trust Relationship/Rapport: care explained  Taken 2/12/2024 0800 by Micheal Webb RN  Trust Relationship/Rapport: care explained  Goal: Readiness for Transition of Care  Outcome: Ongoing, Progressing     Problem: Infection  Goal: Absence of Infection Signs and Symptoms  Outcome: Ongoing, Progressing     Problem: Hypertension Comorbidity  Goal: Blood Pressure in Desired Range  Outcome: Ongoing, Progressing  Intervention: Maintain Blood Pressure Management  Recent Flowsheet Documentation  Taken 2/12/2024 1800 by Micheal Webb RN  Medication Review/Management: medications reviewed  Taken 2/12/2024 1600 by Micheal Webb RN  Medication Review/Management: medications reviewed  Taken 2/12/2024 1400 by Micheal Webb RN  Medication Review/Management: medications reviewed  Taken 2/12/2024 1200 by Micheal Webb RN  Medication Review/Management: medications reviewed  Taken 2/12/2024 1000 by Micheal Webb RN  Medication  Review/Management: medications reviewed  Taken 2/12/2024 0800 by Micheal Webb RN  Medication Review/Management: medications reviewed     Problem: Obstructive Sleep Apnea Risk or Actual Comorbidity Management  Goal: Unobstructed Breathing During Sleep  Outcome: Ongoing, Progressing     Problem: Adjustment to Illness (Sepsis/Septic Shock)  Goal: Optimal Coping  Outcome: Ongoing, Progressing     Problem: Bleeding (Sepsis/Septic Shock)  Goal: Absence of Bleeding  Outcome: Ongoing, Progressing     Problem: Glycemic Control Impaired (Sepsis/Septic Shock)  Goal: Blood Glucose Level Within Desired Range  Outcome: Ongoing, Progressing     Problem: Infection Progression (Sepsis/Septic Shock)  Goal: Absence of Infection Signs and Symptoms  Outcome: Ongoing, Progressing  Intervention: Promote Recovery  Recent Flowsheet Documentation  Taken 2/12/2024 1800 by Micheal Webb RN  Activity Management: activity encouraged  Taken 2/12/2024 1600 by Micheal Webb RN  Activity Management: activity encouraged  Taken 2/12/2024 1400 by Micheal Webb, RN  Activity Management: activity encouraged  Taken 2/12/2024 1200 by Micheal Webb, RN  Activity Management: activity encouraged  Taken 2/12/2024 1000 by Micheal Webb, RN  Activity Management: activity encouraged  Taken 2/12/2024 0800 by Micheal Webb, RN  Activity Management: activity encouraged     Problem: Nutrition Impaired (Sepsis/Septic Shock)  Goal: Optimal Nutrition Intake  Outcome: Ongoing, Progressing     Problem: Noninvasive Ventilation Acute  Goal: Effective Unassisted Ventilation and Oxygenation  Outcome: Ongoing, Progressing   Goal Outcome Evaluation:

## 2024-02-12 NOTE — PLAN OF CARE
Patient admitted from ED. Tmax 101, tylenol given. O2 increased from 2L- 5L this shift. Potassium replaced per protocol. No reports of pain.     Goal Outcome Evaluation:  Plan of Care Reviewed With: patient        Progress: no change

## 2024-02-12 NOTE — PROGRESS NOTES
[facility] Medicine Services  PROGRESS NOTE    Patient Name: Nile Chin  : 1960  MRN: [id number]    Date of Admission: 2024  Primary Care Physician: Job Santana MD    Subjective   Subjective     CC:  SOB    HPI:  Pt doing ok, still very weak and SOB with any exertion.  Dec appetite.      Objective   Objective     Vital Signs:   Temp:  [96.8 °F (36 °C)-101 °F (38.3 °C)] 99.3 °F (37.4 °C)  Heart Rate:  [85-98] 87  Resp:  [18-24] 20  BP: ()/(52-85) 124/73  Flow (L/min):  [2-40] 5     Physical Exam:  Constitutional: No acute distress, awake, alert  HENT: NCAT, mucous membranes moist  Respiratory: Scattered mild inspiratory uses bilaterally, respiratory effort normal, on 5 L nasal cannula  Cardiovascular: RRR, no murmurs, rubs, or gallops  Gastrointestinal: Positive bowel sounds, soft, nontender, nondistended  Musculoskeletal: No bilateral ankle edema  Psychiatric: Appropriate affect, cooperative  Neurologic: Oriented x 3, strength symmetric in all extremities, Cranial Nerves grossly intact to confrontation, speech clear  Skin: No rashes      Results Reviewed:  LAB RESULTS:      Lab 24  0600 24  1618   WBC 4.68 6.00   HEMOGLOBIN 12.6* 13.6   HEMATOCRIT 37.9 41.2   PLATELETS 144 169   NEUTROS ABS 3.98 5.44   IMMATURE GRANS (ABS) 0.02 0.02   LYMPHS ABS 0.26* 0.19*   MONOS ABS 0.37 0.30   EOS ABS 0.03 0.04   MCV 89.6 88.6   PROCALCITONIN  --  0.19   LACTATE  --  1.5         Lab 24  0600 24  1618   SODIUM 138 138   POTASSIUM 3.8 3.4*   CHLORIDE 101 98   CO2 28.0 28.0   ANION GAP 9.0 12.0   BUN 16 16   CREATININE 0.73* 0.94   EGFR 101.6 90.5   GLUCOSE 133* 124*   CALCIUM 8.5* 8.9   MAGNESIUM  --  1.6         Lab 24  1618   TOTAL PROTEIN 7.1   ALBUMIN 4.1   GLOBULIN 3.0   ALT (SGPT) 24   AST (SGOT) 20   BILIRUBIN 0.9   ALK PHOS 80         Lab 24  0026 24  2207 24  1618   PROBNP  --   --  120.0   HSTROP T 24* 22* 28*                  Brief Urine Lab Results       None            Microbiology Results Abnormal       None            CT Angiogram Chest    Result Date: 2/11/2024  CT ANGIOGRAM CHEST Date of Exam: 2/11/2024 6:13 PM EST Indication: acute sob/hypoxia, eval for pe. Comparison: 1/2/2021 Technique: CTA of the chest was performed after the uneventful intravenous administration of 85 cc Isovue-370 . Reconstructed coronal and sagittal images were also obtained. In addition, a 3-D volume rendered image was created for interpretation. Automated exposure control and iterative reconstruction methods were used. Findings: The thyroid gland is normal. The subglottic airway is clear. No evidence of aortic dissection. The pulmonary arteries are clear. No pulmonary embolus. Mild atheromatous disease of the coronary arteries. No consolidation. No pneumothorax or pleural effusion. No adenopathy. Limited evaluation of the upper abdomen appears normal. Thoracic vertebral body height and alignment are normal. No lytic or blastic disease.     Impression: No pulmonary embolus. No acute cardiopulmonary disease. Electronically Signed: Nile Cruz MD  2/11/2024 6:39 PM EST  Workstation ID: [id number]    XR Chest 1 View    Result Date: 2/11/2024  XR CHEST 1 VW Date of Exam: 2/11/2024 4:02 PM EST Indication: SOA triage protocol Comparison: CT chest January 2, 2021 Findings: The heart is magnified. There is some elevation right hemidiaphragm. There is no bridgett consolidation or obvious pleural effusions.     Impression: Impression: 1.No definite acute pulmonary process. 2.Some elevation right hemidiaphragm which has been suggested. Electronically Signed: Vu Goyal MD  2/11/2024 4:22 PM EST  Workstation ID: [id number]     Results for orders placed during the hospital encounter of 01/02/21    Transthoracic Echo Complete With Contrast if Necessary Per Protocol    Interpretation Summary  · Estimated left ventricular EF = 60% Left ventricular systolic function  is normal.    Indications  Chest pain    Technically satisfactory study.  Mitral valve is structurally normal.  Tricuspid valve is structurally normal.  Aortic valve is structurally normal.  Pulmonic valve could not be well visualized.  No evidence for mitral tricuspid or aortic regurgitation is seen by Doppler study.  Left atrium is normal in size.  Right atrium is normal in size.  Left ventricle is normal in size and contractility with ejection fraction of 60%.  Right ventricle is normal in size.  Atrial septum is intact.  Aorta is normal.  No pericardial effusion or intracardiac thrombus is seen.    Impression  Structurally and functionally normal cardiac valves.  Left ventricular size and contractility is normal with ejection fraction of 60%.      Current medications:  Scheduled Meds:allopurinol, 300 mg, Oral, Daily  enoxaparin, 40 mg, Subcutaneous, Daily  ipratropium-albuterol, 3 mL, Nebulization, 4x Daily - RT  montelukast, 10 mg, Oral, Daily  oseltamivir, 75 mg, Oral, Q12H  pantoprazole, 40 mg, Oral, Q AM  sodium chloride, 10 mL, Intravenous, Q12H      Continuous Infusions:sodium chloride, 100 mL/hr, Last Rate: 100 mL/hr (02/12/24 0459)      PRN Meds:.  acetaminophen **OR** acetaminophen **OR** acetaminophen    senna-docusate sodium **AND** polyethylene glycol **AND** bisacodyl **AND** bisacodyl    Calcium Replacement - Follow Nurse / BPA Driven Protocol    fluticasone    ipratropium-albuterol    Magnesium Standard Dose Replacement - Follow Nurse / BPA Driven Protocol    nitroglycerin    ondansetron ODT **OR** ondansetron    Phosphorus Replacement - Follow Nurse / BPA Driven Protocol    Potassium Replacement - Follow Nurse / BPA Driven Protocol    sodium chloride    sodium chloride    sodium chloride    Assessment & Plan   Assessment & Plan     Active Hospital Problems    Diagnosis  POA    **Hypoxia [R09.02]  Unknown    Influenza A [J10.1]  Yes    GERD (gastroesophageal reflux disease) [K21.9]  Yes     Hypokalemia [E87.6]  Yes    Hypertension [I10]  Yes    Obstructive sleep apnea [G47.33]  Yes      Resolved Hospital Problems    Diagnosis Date Resolved POA    Hypoxia [R09.02] 02/11/2024 Yes        Brief Hospital Course to date:  Nile Chin is a 63 y.o. male with a past medical history significant for GERD, gout, essential hypertension, DVT and SY on CPAP who presented to the ED with complaint of worsening shortness of breath.      Influenza A with hypoxia   Acute hypoxic respiratory failure-present on admission  Viral pneumonia secondary to influenza, present on admission  -On 5L NC, wean as tolerated.  -CTA chest negative for PE as well as a consolidative pneumonia  -Cont tamiflu. Supportive care. Duo-nebs scheduled and prn. Droplet precautions. Gentle IVF. Continuous pulse ox. SpO2 goal >90%.     Elevated troponin  -Likely NSTEMI type II secondary to acute hypoxia  -Initial troponin 28 ->22  -EKG with RBBB, no acute ST-T changes  -No further workup at this time.     Hypokalemia-improved  -replace per protocol   -monitor on telemetry      GERD  -PPI      Essential hypertension   -Holding amlodipine and HCTZ, BP borderline.     Gout  -Continue allopurinol.     SY  -CPAP nightly.    Expected Discharge Location and Transportation: home  Expected Discharge   Expected Discharge Date: 2/13/2024; Expected Discharge Time:      DVT prophylaxis:  Medical DVT prophylaxis orders are present.         AM-PAC 6 Clicks Score (PT): 24 (02/12/24 0800)    CODE STATUS:   Code Status and Medical Interventions:   Ordered at: 02/11/24 2132     Level Of Support Discussed With:    Patient     Code Status (Patient has no pulse and is not breathing):    CPR (Attempt to Resuscitate)     Medical Interventions (Patient has pulse or is breathing):    Full Support       Adriana Cardoza MD  02/12/24

## 2024-02-12 NOTE — ED NOTES
Nile Chin    Nursing Report ED to Floor:  Mental status: A&OX4  Ambulatory status: Standby  Oxygen Therapy:  2L via NC   Cardiac Rhythm: Normal Sinus   Admitted from: Home  Safety Concerns:  None   Social Issues: None  ED Room #:  9    ED Nurse Phone Extension - [phone] or may call [phone].      HPI:   Chief Complaint   Patient presents with    Fever       Past Medical History:  Past Medical History:   Diagnosis Date    Allergies     GERD (gastroesophageal reflux disease)     Gout     Hypertension     Melanoma     Pyloric stenosis         Past Surgical History:  Past Surgical History:   Procedure Laterality Date    APPENDECTOMY          Admitting Doctor:   Tonya Galdamez DO    Consulting Provider(s):  Consults       No orders found from 1/13/2024 to 2/12/2024.             Admitting Diagnosis:   There were no encounter diagnoses.    Most Recent Vitals:   Vitals:    02/11/24 1858 02/11/24 1931 02/11/24 2002 02/11/24 2032   BP: 121/72 106/60 120/69 120/77   BP Location:       Patient Position:       Pulse: 92 94 94 90   Resp:       Temp:       TempSrc:       SpO2: 92% 91% 91% 93%   Weight:       Height:           Active LDAs/IV Access:   Lines, Drains & Airways       Active LDAs       Name Placement date Placement time Site Days    Peripheral IV 02/11/24 1619 Right Antecubital 02/11/24  1619  Antecubital  less than 1                    Labs (abnormal labs have a star):   Labs Reviewed   COVID-19 AND FLU A/B, NP SWAB IN TRANSPORT MEDIA 1 HR TAT - Abnormal; Notable for the following components:       Result Value    Influenza A PCR Detected (*)     All other components within normal limits    Narrative:     Fact sheet for providers: https://www.fda.gov/media/547782/download    Fact sheet for patients: https://www.fda.gov/media/135973/download    Test performed by PCR.   COMPREHENSIVE METABOLIC PANEL - Abnormal; Notable for the following components:    Glucose 124 (*)     Potassium 3.4 (*)     All other  components within normal limits    Narrative:     GFR Normal >60  Chronic Kidney Disease <60  Kidney Failure <15     SINGLE HSTROPONIN T - Abnormal; Notable for the following components:    HS Troponin T 28 (*)     All other components within normal limits    Narrative:     High Sensitive Troponin T Reference Range:  <14.0 ng/L- Negative Female for AMI  <22.0 ng/L- Negative Male for AMI  >=14 - Abnormal Female indicating possible myocardial injury.  >=22 - Abnormal Male indicating possible myocardial injury.   Clinicians would have to utilize clinical acumen, EKG, Troponin, and serial changes to determine if it is an Acute Myocardial Infarction or myocardial injury due to an underlying chronic condition.        CBC WITH AUTO DIFFERENTIAL - Abnormal; Notable for the following components:    Neutrophil % 90.6 (*)     Lymphocyte % 3.2 (*)     Lymphocytes, Absolute 0.19 (*)     All other components within normal limits   BNP (IN-HOUSE) - Normal    Narrative:     This assay is used as an aid in the diagnosis of individuals suspected of having heart failure. It can be used as an aid in the diagnosis of acute decompensated heart failure (ADHF) in patients presenting with signs and symptoms of ADHF to the emergency department (ED). In addition, NT-proBNP of <300 pg/mL indicates ADHF is not likely.    Age Range Result Interpretation  NT-proBNP Concentration (pg/mL:      <50             Positive            >450                   Gray                 300-450                    Negative             <300    50-75           Positive            >900                  Gray                300-900                  Negative            <300      >75             Positive            >1800                  Gray                300-1800                  Negative            <300   LACTIC ACID, PLASMA - Normal   PROCALCITONIN - Normal    Narrative:     As a Marker for Sepsis (Non-Neonates):    1. <0.5 ng/mL represents a low risk of severe  "sepsis and/or septic shock.  2. >2 ng/mL represents a high risk of severe sepsis and/or septic shock.    As a Marker for Lower Respiratory Tract Infections that require antibiotic therapy:    PCT on Admission    Antibiotic Therapy       6-12 Hrs later    >0.5                Strongly Recommended  >0.25 - <0.5        Recommended   0.1 - 0.25          Discouraged              Remeasure/reassess PCT  <0.1                Strongly Discouraged     Remeasure/reassess PCT    As 28 day mortality risk marker: \"Change in Procalcitonin Result\" (>80% or <=80%) if Day 0 (or Day 1) and Day 4 values are available. Refer to http://www.[facility]-pct-calculator.com    Change in PCT <=80%  A decrease of PCT levels below or equal to 80% defines a positive change in PCT test result representing a higher risk for 28-day all-cause mortality of patients diagnosed with severe sepsis for septic shock.    Change in PCT >80%  A decrease of PCT levels of more than 80% defines a negative change in PCT result representing a lower risk for 28-day all-cause mortality of patients diagnosed with severe sepsis or septic shock.      COVID PRE-OP / PRE-PROCEDURE SCREENING ORDER (NO ISOLATION)    Narrative:     The following orders were created for panel order COVID PRE-OP / PRE-PROCEDURE SCREENING ORDER (NO ISOLATION) - Swab, Nasopharynx.  Procedure                               Abnormality         Status                     ---------                               -----------         ------                     COVID-19 and FLU A/B PCR...[[id number]]  Abnormal            Final result                 Please view results for these tests on the individual orders.   BLOOD CULTURE   BLOOD CULTURE   RAINBOW DRAW    Narrative:     The following orders were created for panel order Maple Draw.  Procedure                               Abnormality         Status                     ---------                               -----------         ------                   "   Green Top (Gel)[[id number]]                                  Final result               Lavender Top[[id number]]                                     Final result               Gold Top - SST[[id number]]                                   Final result               [place] Top[[id number]]                                         Final result               Light Blue Top[[id number]]                                   Final result                 Please view results for these tests on the individual orders.   CBC AND DIFFERENTIAL    Narrative:     The following orders were created for panel order CBC & Differential.  Procedure                               Abnormality         Status                     ---------                               -----------         ------                     CBC Auto Differential[[id number]]        Abnormal            Final result                 Please view results for these tests on the individual orders.   GREEN TOP   LAVENDER TOP   GOLD TOP - SST   GRAY TOP   LIGHT BLUE TOP       Meds Given in ED:   Medications   sodium chloride 0.9 % flush 10 mL (has no administration in time range)   iopamidol (ISOVUE-370) 76 % injection 85 mL (85 mL Intravenous Given 2/11/24 8279)

## 2024-02-12 NOTE — H&P
[facility] Medicine Services  HISTORY AND PHYSICAL    Patient Name: Nile Chin  : 1960  MRN: [id number]  Primary Care Physician: Job Santana MD  Date of admission: 2024    Subjective   Subjective     Chief Complaint:  Shortness of breath    HPI:  Nile Chin is a 63 y.o. male with a past medical history significant for GERD, gout, essential hypertension, DVT and SY who presented to the ED with complaint of worsening shortness of breath. Patient reports a chronic cough since 2023 however today his symptoms worsened.  He reports a fever, myalgias, nausea, dizziness and worsening shortness of breath today prompting his arrival to the ED. Upon arrival his RA oxgygen saturation was 80%.  Patient is currently on 3L via NC with oxygen sat 91%.  Patient denies any chest pain, abdominal pain, vomiting, diarrhea, palpitations or dysuria.  Workup in the ED included COVID/FLU/RSV PCR that resulted in positive influenza A.  Patient denies any known ill contacts.  CTA of chest showed no pulmonary embolus. No active cardiopulmonary disease.  Patient will be admitted to [facility] under the care of the Hospitalist for further evaluation and treatment.       Review of Systems   Constitutional:  Positive for appetite change, chills, fatigue and fever. Negative for activity change and diaphoresis.   Eyes:  Negative for photophobia and visual disturbance.   Respiratory:  Positive for cough and shortness of breath.    Cardiovascular:  Negative for chest pain, palpitations and leg swelling.   Gastrointestinal:  Positive for nausea. Negative for abdominal distention, abdominal pain, blood in stool, constipation, diarrhea and vomiting.   Genitourinary:  Negative for difficulty urinating, dysuria, flank pain, frequency and hematuria.   Musculoskeletal:  Negative for back pain, neck pain and neck stiffness.   Skin: Negative.    Neurological:  Positive for dizziness, light-headedness  and headaches. Negative for syncope, speech difficulty, weakness and numbness.   Psychiatric/Behavioral: Negative.          Personal History     Past Medical History:   Diagnosis Date    Allergies     GERD (gastroesophageal reflux disease)     Gout     Hypertension     Melanoma     Pyloric stenosis      Past Surgical History:   Procedure Laterality Date    APPENDECTOMY         Family History:  family history includes COPD in his mother; Cancer in his father.     Social History:  reports that he has never smoked. He has never used smokeless tobacco. He reports that he does not drink alcohol and does not use drugs.  Social History     Social History Narrative    Not on file       Medications:  allopurinol, fluticasone, lisinopril, montelukast, naproxen, omeprazole, and predniSONE    Allergies   Allergen Reactions    Other Unknown - High Severity     Catgut sutures  Catgut sutures      Sulfa Antibiotics Rash    Suture Rash     Cat gut suture       Objective   Objective     Vital Signs:   Temp:  [98.1 °F (36.7 °C)] 98.1 °F (36.7 °C)  Heart Rate:  [90-98] 91  Resp:  [24] 24  BP: ()/(54-77) 100/54  Flow (L/min):  [2] 2    Physical Exam  Vitals and nursing note reviewed.   Constitutional:       General: He is not in acute distress.     Appearance: Normal appearance. He is ill-appearing. He is not toxic-appearing or diaphoretic.   HENT:      Head: Normocephalic and atraumatic.      Nose: Nose normal.      Mouth/Throat:      Mouth: Mucous membranes are dry.      Pharynx: Oropharynx is clear.   Eyes:      Extraocular Movements: Extraocular movements intact.      Conjunctiva/sclera: Conjunctivae normal.      Pupils: Pupils are equal, round, and reactive to light.   Cardiovascular:      Rate and Rhythm: Regular rhythm. Tachycardia present.      Pulses: Normal pulses.      Heart sounds: Normal heart sounds.   Pulmonary:      Effort: Pulmonary effort is normal.      Breath sounds: Wheezing present.   Abdominal:       General: Bowel sounds are normal. There is no distension.      Palpations: Abdomen is soft. There is no mass.      Tenderness: There is no abdominal tenderness. There is no right CVA tenderness, left CVA tenderness, guarding or rebound.      Hernia: No hernia is present.   Musculoskeletal:         General: No swelling, tenderness, deformity or signs of injury. Normal range of motion.      Cervical back: Normal range of motion and neck supple.      Right lower leg: No edema.      Left lower leg: No edema.   Skin:     General: Skin is warm and dry.   Neurological:      General: No focal deficit present.      Mental Status: He is alert and oriented to person, place, and time.   Psychiatric:         Mood and Affect: Mood normal.         Behavior: Behavior normal.         Thought Content: Thought content normal.         Judgment: Judgment normal.            Result Review:  I have personally reviewed the results from the time of this admission to 2/11/2024 21:17 EST and agree with these findings:  [x]  Laboratory list / accordion  [x]  Microbiology  [x]  Radiology  [x]  EKG/Telemetry   []  Cardiology/Vascular   []  Pathology  []  Old records  []  Other:  Most notable findings include:     LAB RESULTS:      Lab 02/11/24  1618   WBC 6.00   HEMOGLOBIN 13.6   HEMATOCRIT 41.2   PLATELETS 169   NEUTROS ABS 5.44   IMMATURE GRANS (ABS) 0.02   LYMPHS ABS 0.19*   MONOS ABS 0.30   EOS ABS 0.04   MCV 88.6   PROCALCITONIN 0.19   LACTATE 1.5         Lab 02/11/24  1618   SODIUM 138   POTASSIUM 3.4*   CHLORIDE 98   CO2 28.0   ANION GAP 12.0   BUN 16   CREATININE 0.94   EGFR 90.5   GLUCOSE 124*   CALCIUM 8.9         Lab 02/11/24  1618   TOTAL PROTEIN 7.1   ALBUMIN 4.1   GLOBULIN 3.0   ALT (SGPT) 24   AST (SGOT) 20   BILIRUBIN 0.9   ALK PHOS 80         Lab 02/11/24  1618   PROBNP 120.0   HSTROP T 28*                 Brief Urine Lab Results       None          Microbiology Results (last 10 days)       Procedure Component Value - Date/Time     COVID PRE-OP / PRE-PROCEDURE SCREENING ORDER (NO ISOLATION) - Swab, Nasopharynx [[id number]]  (Abnormal) Collected: 02/11/24 1617    Lab Status: Final result Specimen: Swab from Nasopharynx Updated: 02/11/24 1739    Narrative:      The following orders were created for panel order COVID PRE-OP / PRE-PROCEDURE SCREENING ORDER (NO ISOLATION) - Swab, Nasopharynx.  Procedure                               Abnormality         Status                     ---------                               -----------         ------                     COVID-19 and FLU A/B PCR...[[id number]]  Abnormal            Final result                 Please view results for these tests on the individual orders.    COVID-19 and FLU A/B PCR, 1 HR TAT - Swab, Nasopharynx [[id number]]  (Abnormal) Collected: 02/11/24 1617    Lab Status: Final result Specimen: Swab from Nasopharynx Updated: 02/11/24 1739     COVID19 Not Detected     Influenza A PCR Detected     Influenza B PCR Not Detected    Narrative:      Fact sheet for providers: https://www.fda.gov/media/954083/download    Fact sheet for patients: https://www.fda.gov/media/416281/download    Test performed by PCR.            CT Angiogram Chest    Result Date: 2/11/2024  CT ANGIOGRAM CHEST Date of Exam: 2/11/2024 6:13 PM EST Indication: acute sob/hypoxia, eval for pe. Comparison: 1/2/2021 Technique: CTA of the chest was performed after the uneventful intravenous administration of 85 cc Isovue-370 . Reconstructed coronal and sagittal images were also obtained. In addition, a 3-D volume rendered image was created for interpretation. Automated exposure control and iterative reconstruction methods were used. Findings: The thyroid gland is normal. The subglottic airway is clear. No evidence of aortic dissection. The pulmonary arteries are clear. No pulmonary embolus. Mild atheromatous disease of the coronary arteries. No consolidation. No pneumothorax or pleural effusion. No adenopathy. Limited  evaluation of the upper abdomen appears normal. Thoracic vertebral body height and alignment are normal. No lytic or blastic disease.     Impression: No pulmonary embolus. No acute cardiopulmonary disease. Electronically Signed: Nile Cruz MD  2/11/2024 6:39 PM EST  Workstation ID: [id number]    XR Chest 1 View    Result Date: 2/11/2024  XR CHEST 1 VW Date of Exam: 2/11/2024 4:02 PM EST Indication: SOA triage protocol Comparison: CT chest January 2, 2021 Findings: The heart is magnified. There is some elevation right hemidiaphragm. There is no bridgett consolidation or obvious pleural effusions.     Impression: Impression: 1.No definite acute pulmonary process. 2.Some elevation right hemidiaphragm which has been suggested. Electronically Signed: Vu Goyal MD  2/11/2024 4:22 PM EST  Workstation ID: [id number]     Results for orders placed during the hospital encounter of 01/02/21    Transthoracic Echo Complete With Contrast if Necessary Per Protocol    Interpretation Summary  · Estimated left ventricular EF = 60% Left ventricular systolic function is normal.    Indications  Chest pain    Technically satisfactory study.  Mitral valve is structurally normal.  Tricuspid valve is structurally normal.  Aortic valve is structurally normal.  Pulmonic valve could not be well visualized.  No evidence for mitral tricuspid or aortic regurgitation is seen by Doppler study.  Left atrium is normal in size.  Right atrium is normal in size.  Left ventricle is normal in size and contractility with ejection fraction of 60%.  Right ventricle is normal in size.  Atrial septum is intact.  Aorta is normal.  No pericardial effusion or intracardiac thrombus is seen.    Impression  Structurally and functionally normal cardiac valves.  Left ventricular size and contractility is normal with ejection fraction of 60%.      Assessment & Plan   Assessment & Plan       Acute respiratory failure with hypoxia    Hypertension    Obstructive sleep  apnea    Influenza A    GERD (gastroesophageal reflux disease)    Hypokalemia    Nile Chin is a 63 y.o. male with a past medical history significant for GERD, gout, essential hypertension, DVT and SY on CPAP who presented to the ED with complaint of worsening shortness of breath.     Influenza A with hypoxia   -RA oxgyen sat 80%, required 2-3L oxygen in ED with O2 sat 91%  -CTA chest negative for PE  -Started tamiflu. Supportive care. Duo-nebs scheduled and prn. Droplet precautions. Gentle IVF. Continuous pulse ox. SpO2 goal >90%.    Elevated troponin  -Likely NSTEMI type II secondary to acute hypoxia  -Initial troponin 28 ->22  -EKG with RBBB, no acute ST-T changes  -No further workup at this time.    Hypokalemia  -replace per protocol   -check magnesium, repeat BMP in AM  -monitor on telemetry     GERD  -PPI     Essential hypertension   -Holding amlodipine and HCTZ, BP borderline.    Gout  -Continue allopurinol.    SY  -CPAP nightly.    DVT prophylaxis: Lovenox     CODE STATUS: Full Code        Expected Discharge  TBD     This note has been completed as part of a split-shared workflow.     Signature: Electronically signed by MONICA Noriega, 02/11/24, 9:29 PM EST.        Attending   Admission Attestation       I have performed an independent face-to-face diagnostic evaluation including performing an independent physical examination.  I approve of the documented plan of care above that was reviewed and developed with the advanced practice clinician (APC) and take responsibility for that plan along with its associated risks.  I have updated the HPI as appropriate.    Brief HPI    Nile Chin is a 63 y.o. male with a past medical history significant for GERD, gout, essential hypertension, DVT and SY on CPAP who presented to the ED with complaint of worsening shortness of breath. Patient reports that he has had a worsening cough since having a bad cold in December. Cough worsened this week, today  2/11 developed shortness of breath with fever and aches. Temperature was 102 at home. Subsequently presented to the ED due to concern over shortness of breath.     Attending Physical Exam:  Temp:  [96.8 °F (36 °C)-100.7 °F (38.2 °C)] 96.8 °F (36 °C)  Heart Rate:  [85-98] 85  Resp:  [20-24] 23  BP: ()/(52-85) 111/66  Flow (L/min):  [2-40] 40    Constitutional: Awake, alert, acutely ill-appearing, resting comfortably  Eyes: PERRLA, sclerae anicteric, no conjunctival injection  HENT: NCAT, mucous membranes moist  Neck: Supple, no thyromegaly, no lymphadenopathy, trachea midline  Respiratory: Faint, scattered wheezes bilaterally in upper and lower lung fields  Cardiovascular: RRR, no murmurs, rubs, or gallops  Gastrointestinal: Positive bowel sounds, soft, nontender, nondistended  Musculoskeletal: No bilateral ankle edema, no clubbing or cyanosis to extremities  Psychiatric: Appropriate affect, cooperative  Neurologic: Alert and oriented x 3, no focal deficits, speech clear  Skin: No rashes     Result Review:  I have personally reviewed the results from the time of this admission to 2/12/2024 03:36 EST and agree with these findings:  [x]  Laboratory list / accordion  [x]  Microbiology  [x]  Radiology  [x]  EKG/Telemetry   []  Cardiology/Vascular   []  Pathology  []  Old records  []  Other:    Assessment and Plan:    See assessment and plan documented by APC above and updated/edited by me as appropriate.    Tonya Galdamez,   02/12/24

## 2024-02-12 NOTE — CASE MANAGEMENT/SOCIAL WORK
Discharge Planning Assessment  [facility]     Patient Name: Nile Chin  MRN: [id number]  Today's Date: 2/12/2024    Admit Date: 2/11/2024    Plan: Home at DC   Discharge Needs Assessment       Row Name 02/12/24 1022       Living Environment    People in Home spouse    Current Living Arrangements home       Discharge Needs Assessment    Equipment Currently Used at Home cpap    Equipment Needed After Discharge none    Discharge Coordination/Progress Unsure his C pap provider. Denies current use of HH or outpt services.                   Discharge Plan       Row Name 02/12/24 1023       Plan    Plan Home at DC    Patient/Family in Agreement with Plan yes    Plan Comments I spoke with the pt and spouse. They deny any DC needs at this time.    Final Discharge Disposition Code 01 - home or self-care      Row Name 02/12/24 0830       Plan    Final Discharge Disposition Code 01 - home or self-care                  Continued Care and Services - Admitted Since 2/11/2024    Coordination has not been started for this encounter.       Expected Discharge Date and Time       Expected Discharge Date Expected Discharge Time    Feb 13, 2024            Demographic Summary    No documentation.                  Functional Status       Row Name 02/12/24 1021       Functional Status    Usual Activity Tolerance good       Functional Status, IADL    Medications independent    Meal Preparation independent    Housekeeping independent    Laundry independent    Shopping independent                   Psychosocial    No documentation.                  Abuse/Neglect    No documentation.                  Legal    No documentation.                  Substance Abuse    No documentation.                  Patient Forms    No documentation.                     Deidre Meier RN

## 2024-02-13 PROCEDURE — 94664 DEMO&/EVAL PT USE INHALER: CPT

## 2024-02-13 PROCEDURE — 99232 SBSQ HOSP IP/OBS MODERATE 35: CPT | Performed by: PEDIATRICS

## 2024-02-13 PROCEDURE — 94799 UNLISTED PULMONARY SVC/PX: CPT

## 2024-02-13 PROCEDURE — 94761 N-INVAS EAR/PLS OXIMETRY MLT: CPT

## 2024-02-13 PROCEDURE — 25010000002 ENOXAPARIN PER 10 MG: Performed by: NURSE PRACTITIONER

## 2024-02-13 RX ADMIN — Medication 10 ML: at 08:33

## 2024-02-13 RX ADMIN — ENOXAPARIN SODIUM 40 MG: 100 INJECTION SUBCUTANEOUS at 20:44

## 2024-02-13 RX ADMIN — IPRATROPIUM BROMIDE AND ALBUTEROL SULFATE 3 ML: 2.5; .5 SOLUTION RESPIRATORY (INHALATION) at 08:42

## 2024-02-13 RX ADMIN — OSELTAMIVIR PHOSPHATE 75 MG: 75 CAPSULE ORAL at 20:44

## 2024-02-13 RX ADMIN — SENNOSIDES AND DOCUSATE SODIUM 2 TABLET: 8.6; 5 TABLET ORAL at 20:44

## 2024-02-13 RX ADMIN — PANTOPRAZOLE SODIUM 40 MG: 40 TABLET, DELAYED RELEASE ORAL at 08:33

## 2024-02-13 RX ADMIN — MONTELUKAST 10 MG: 10 TABLET, FILM COATED ORAL at 08:33

## 2024-02-13 RX ADMIN — ALLOPURINOL 300 MG: 300 TABLET ORAL at 08:33

## 2024-02-13 RX ADMIN — Medication 10 ML: at 20:44

## 2024-02-13 RX ADMIN — OSELTAMIVIR PHOSPHATE 75 MG: 75 CAPSULE ORAL at 08:33

## 2024-02-13 RX ADMIN — IPRATROPIUM BROMIDE AND ALBUTEROL SULFATE 3 ML: 2.5; .5 SOLUTION RESPIRATORY (INHALATION) at 13:38

## 2024-02-13 RX ADMIN — IPRATROPIUM BROMIDE AND ALBUTEROL SULFATE 3 ML: 2.5; .5 SOLUTION RESPIRATORY (INHALATION) at 16:47

## 2024-02-13 RX ADMIN — IPRATROPIUM BROMIDE AND ALBUTEROL SULFATE 3 ML: 2.5; .5 SOLUTION RESPIRATORY (INHALATION) at 21:25

## 2024-02-13 NOTE — PROGRESS NOTES
[facility] Medicine Services  PROGRESS NOTE    Patient Name: Nile Chin  : 1960  MRN: [id number]    Date of Admission: 2024  Primary Care Physician: Job Santana MD    Subjective   Subjective     CC:  SOB    HPI:  Having some abd pain from coughing.  Still SOB and wheezing, maybe a little better than yesterday.      Objective   Objective     Vital Signs:   Temp:  [98.7 °F (37.1 °C)-100.7 °F (38.2 °C)] 99.1 °F (37.3 °C)  Heart Rate:  [77-89] 77  Resp:  [18-22] 18  BP: (113-138)/(70-87) 136/87  Flow (L/min):  [4-5] 4     Physical Exam:  Constitutional: No acute distress, awake, alert, uncomfortable appearing  HENT: NCAT, mucous membranes moist  Respiratory: Scattered mild inspiratory uses bilaterally, respiratory effort normal, on 4 L nasal cannula  Cardiovascular: RRR, no murmurs, rubs, or gallops  Gastrointestinal: Positive bowel sounds, soft, nontender, nondistended  Musculoskeletal: No bilateral ankle edema  Psychiatric: Appropriate affect, cooperative  Neurologic: Oriented x 3, strength symmetric in all extremities, Cranial Nerves grossly intact to confrontation, speech clear  Skin: No rashes      Results Reviewed:  LAB RESULTS:      Lab 24  0600 24  1618   WBC 4.68 6.00   HEMOGLOBIN 12.6* 13.6   HEMATOCRIT 37.9 41.2   PLATELETS 144 169   NEUTROS ABS 3.98 5.44   IMMATURE GRANS (ABS) 0.02 0.02   LYMPHS ABS 0.26* 0.19*   MONOS ABS 0.37 0.30   EOS ABS 0.03 0.04   MCV 89.6 88.6   PROCALCITONIN  --  0.19   LACTATE  --  1.5         Lab 24  0600 24  1618   SODIUM 138 138   POTASSIUM 3.8 3.4*   CHLORIDE 101 98   CO2 28.0 28.0   ANION GAP 9.0 12.0   BUN 16 16   CREATININE 0.73* 0.94   EGFR 101.6 90.5   GLUCOSE 133* 124*   CALCIUM 8.5* 8.9   MAGNESIUM  --  1.6         Lab 24  1618   TOTAL PROTEIN 7.1   ALBUMIN 4.1   GLOBULIN 3.0   ALT (SGPT) 24   AST (SGOT) 20   BILIRUBIN 0.9   ALK PHOS 80         Lab 24  0026 24  8773  02/11/24 1618   PROBNP  --   --  120.0   HSTROP T 24* 22* 28*                 Brief Urine Lab Results       None            Microbiology Results Abnormal       Procedure Component Value - Date/Time    Blood Culture - Blood, Arm, Right [[id number]]  (Normal) Collected: 02/11/24 1618    Lab Status: Preliminary result Specimen: Blood from Arm, Right Updated: 02/12/24 1645     Blood Culture No growth at 24 hours    Narrative:      Less than seven (7) mL's of blood was collected.  Insufficient quantity may yield false negative results.    Blood Culture - Blood, Arm, Left [[id number]]  (Normal) Collected: 02/11/24 1625    Lab Status: Preliminary result Specimen: Blood from Arm, Left Updated: 02/12/24 1645     Blood Culture No growth at 24 hours    Narrative:      Less than seven (7) mL's of blood was collected.  Insufficient quantity may yield false negative results.            CT Angiogram Chest    Result Date: 2/11/2024  CT ANGIOGRAM CHEST Date of Exam: 2/11/2024 6:13 PM EST Indication: acute sob/hypoxia, eval for pe. Comparison: 1/2/2021 Technique: CTA of the chest was performed after the uneventful intravenous administration of 85 cc Isovue-370 . Reconstructed coronal and sagittal images were also obtained. In addition, a 3-D volume rendered image was created for interpretation. Automated exposure control and iterative reconstruction methods were used. Findings: The thyroid gland is normal. The subglottic airway is clear. No evidence of aortic dissection. The pulmonary arteries are clear. No pulmonary embolus. Mild atheromatous disease of the coronary arteries. No consolidation. No pneumothorax or pleural effusion. No adenopathy. Limited evaluation of the upper abdomen appears normal. Thoracic vertebral body height and alignment are normal. No lytic or blastic disease.     Impression: No pulmonary embolus. No acute cardiopulmonary disease. Electronically Signed: Nile Cruz MD  2/11/2024 6:39 PM EST  Workstation ID:  [id number]    XR Chest 1 View    Result Date: 2/11/2024  XR CHEST 1 VW Date of Exam: 2/11/2024 4:02 PM EST Indication: SOA triage protocol Comparison: CT chest January 2, 2021 Findings: The heart is magnified. There is some elevation right hemidiaphragm. There is no bridgett consolidation or obvious pleural effusions.     Impression: Impression: 1.No definite acute pulmonary process. 2.Some elevation right hemidiaphragm which has been suggested. Electronically Signed: Vu Goyal MD  2/11/2024 4:22 PM EST  Workstation ID: [id number]     Results for orders placed during the hospital encounter of 01/02/21    Transthoracic Echo Complete With Contrast if Necessary Per Protocol    Interpretation Summary  · Estimated left ventricular EF = 60% Left ventricular systolic function is normal.    Indications  Chest pain    Technically satisfactory study.  Mitral valve is structurally normal.  Tricuspid valve is structurally normal.  Aortic valve is structurally normal.  Pulmonic valve could not be well visualized.  No evidence for mitral tricuspid or aortic regurgitation is seen by Doppler study.  Left atrium is normal in size.  Right atrium is normal in size.  Left ventricle is normal in size and contractility with ejection fraction of 60%.  Right ventricle is normal in size.  Atrial septum is intact.  Aorta is normal.  No pericardial effusion or intracardiac thrombus is seen.    Impression  Structurally and functionally normal cardiac valves.  Left ventricular size and contractility is normal with ejection fraction of 60%.      Current medications:  Scheduled Meds:allopurinol, 300 mg, Oral, Daily  enoxaparin, 40 mg, Subcutaneous, Daily  ipratropium-albuterol, 3 mL, Nebulization, 4x Daily - RT  montelukast, 10 mg, Oral, Daily  oseltamivir, 75 mg, Oral, Q12H  pantoprazole, 40 mg, Oral, Q AM  sodium chloride, 10 mL, Intravenous, Q12H      Continuous Infusions:     PRN Meds:.  acetaminophen **OR** acetaminophen **OR** acetaminophen     senna-docusate sodium **AND** polyethylene glycol **AND** bisacodyl **AND** bisacodyl    Calcium Replacement - Follow Nurse / BPA Driven Protocol    fluticasone    ipratropium-albuterol    Magnesium Standard Dose Replacement - Follow Nurse / BPA Driven Protocol    nitroglycerin    ondansetron ODT **OR** ondansetron    Phosphorus Replacement - Follow Nurse / BPA Driven Protocol    Potassium Replacement - Follow Nurse / BPA Driven Protocol    sodium chloride    sodium chloride    sodium chloride    Assessment & Plan   Assessment & Plan     Active Hospital Problems    Diagnosis  POA    **Hypoxia [R09.02]  Unknown    Influenza A [J10.1]  Yes    GERD (gastroesophageal reflux disease) [K21.9]  Yes    Hypokalemia [E87.6]  Yes    Hypertension [I10]  Yes    Obstructive sleep apnea [G47.33]  Yes      Resolved Hospital Problems    Diagnosis Date Resolved POA    Hypoxia [R09.02] 02/11/2024 Yes        Brief Hospital Course to date:  Nile Chin is a 63 y.o. male with a past medical history significant for GERD, gout, essential hypertension, DVT and SY on CPAP who presented to the ED with complaint of worsening shortness of breath.      Influenza A with hypoxia   Acute hypoxic respiratory failure-present on admission  Viral pneumonia secondary to influenza, present on admission  -On 4L NC, wean as tolerated.  -CTA chest negative for PE as well as a consolidative pneumonia  -Cont tamiflu. Supportive care. Duo-nebs scheduled and prn. Droplet precautions. Continuous pulse ox. SpO2 goal >90%.     Elevated troponin  -Likely NSTEMI type II secondary to acute hypoxia  -Initial troponin 28 ->22  -EKG with RBBB, no acute ST-T changes  -No further workup at this time.     Hypokalemia-improved  -replace per protocol   -monitor on telemetry      GERD  -PPI      Essential hypertension   -Holding HCTZ, BP slowly inc, will restart home amlodipine     Gout  -Continue allopurinol.     SY  -CPAP nightly.    Expected Discharge Location and  Transportation: home  Expected Discharge   Expected Discharge Date: 2/13/2024; Expected Discharge Time:      DVT prophylaxis:  Medical DVT prophylaxis orders are present.         AM-PAC 6 Clicks Score (PT): 24 (02/12/24 2000)    CODE STATUS:   Code Status and Medical Interventions:   Ordered at: 02/11/24 2132     Level Of Support Discussed With:    Patient     Code Status (Patient has no pulse and is not breathing):    CPR (Attempt to Resuscitate)     Medical Interventions (Patient has pulse or is breathing):    Full Support       Adriana Cardoza MD  02/13/24

## 2024-02-13 NOTE — PLAN OF CARE
Goal Outcome Evaluation:   Weaned O2 down throughout shift. Wheezing still present in lungs. VSS. On 2L of oxygen NC. NSR on tele. No complaints at this time.

## 2024-02-14 VITALS
BODY MASS INDEX: 34.8 KG/M2 | OXYGEN SATURATION: 92 % | DIASTOLIC BLOOD PRESSURE: 93 MMHG | RESPIRATION RATE: 18 BRPM | TEMPERATURE: 98.6 F | HEIGHT: 71 IN | WEIGHT: 248.6 LBS | SYSTOLIC BLOOD PRESSURE: 132 MMHG | HEART RATE: 74 BPM

## 2024-02-14 PROBLEM — J10.1 INFLUENZA A: Status: RESOLVED | Noted: 2024-02-11 | Resolved: 2024-02-14

## 2024-02-14 PROBLEM — Z20.822 2019-NCOV NOT DETECTED: Status: RESOLVED | Noted: 2021-01-02 | Resolved: 2024-02-14

## 2024-02-14 PROBLEM — E87.6 HYPOKALEMIA: Status: RESOLVED | Noted: 2024-02-11 | Resolved: 2024-02-14

## 2024-02-14 PROBLEM — R09.02 HYPOXIA: Status: RESOLVED | Noted: 2024-02-11 | Resolved: 2024-02-14

## 2024-02-14 PROCEDURE — 94664 DEMO&/EVAL PT USE INHALER: CPT

## 2024-02-14 PROCEDURE — 94799 UNLISTED PULMONARY SVC/PX: CPT

## 2024-02-14 PROCEDURE — 94761 N-INVAS EAR/PLS OXIMETRY MLT: CPT

## 2024-02-14 PROCEDURE — 99238 HOSP IP/OBS DSCHRG MGMT 30/<: CPT | Performed by: PEDIATRICS

## 2024-02-14 RX ORDER — OSELTAMIVIR PHOSPHATE 75 MG/1
75 CAPSULE ORAL EVERY 12 HOURS SCHEDULED
Qty: 4 CAPSULE | Refills: 0 | Status: SHIPPED | OUTPATIENT
Start: 2024-02-14 | End: 2024-02-16

## 2024-02-14 RX ADMIN — Medication 10 ML: at 09:11

## 2024-02-14 RX ADMIN — IPRATROPIUM BROMIDE AND ALBUTEROL SULFATE 3 ML: 2.5; .5 SOLUTION RESPIRATORY (INHALATION) at 08:32

## 2024-02-14 RX ADMIN — ALLOPURINOL 300 MG: 300 TABLET ORAL at 09:10

## 2024-02-14 RX ADMIN — FLUTICASONE PROPIONATE 1 SPRAY: 50 SPRAY, METERED NASAL at 00:12

## 2024-02-14 RX ADMIN — OSELTAMIVIR PHOSPHATE 75 MG: 75 CAPSULE ORAL at 09:10

## 2024-02-14 RX ADMIN — MONTELUKAST 10 MG: 10 TABLET, FILM COATED ORAL at 09:10

## 2024-02-14 RX ADMIN — PANTOPRAZOLE SODIUM 40 MG: 40 TABLET, DELAYED RELEASE ORAL at 05:26

## 2024-02-14 NOTE — DISCHARGE SUMMARY
[facility] Medicine Services  DISCHARGE SUMMARY    Patient Name: Nile Chin  : 1960  MRN: [id number]    Date of Admission: 2024  3:55 PM  Date of Discharge:  2024  Primary Care Physician: Job Santana MD    Consults       No orders found from 2024 to 2024.            Hospital Course     Presenting Problem: SOB    Active Hospital Problems    Diagnosis  POA    GERD (gastroesophageal reflux disease) [K21.9]  Yes    Hypertension [I10]  Yes    Obstructive sleep apnea [G47.33]  Yes      Resolved Hospital Problems    Diagnosis Date Resolved POA    **Hypoxia [R09.02] 2024 Yes    Hypoxia [R09.02] 2024 Yes    Influenza A [J10.1] 2024 Yes    Hypokalemia [E87.6] 2024 Yes          Hospital Course:  Nile Chin is a 63 y.o. male  with a past medical history significant for GERD, gout, essential hypertension, DVT and SY on CPAP who presented to the ED with complaint of worsening shortness of breath.  Pt dx with influenza     Influenza A with hypoxia -improving  Acute hypoxic respiratory failure-present on admission--resolved  Viral pneumonia secondary to influenza, present on admission  Pt to complete a course of tamiflu.     Elevated troponin-resolved  -No further workup at this time.     Essential hypertension   -OK to restart home medications     Gout  -Continue allopurinol.     SY  -CPAP nightly.      Discharge Follow Up Recommendations for outpatient labs/diagnostics:   F/u with PCP as needed    Day of Discharge     HPI:   Doing much better.  On rounds today was on 1L NC with O2 sats of 95%.  Tolerating diet.  Fatigue and weakness improved.        Vital Signs:   Temp:  [96.9 °F (36.1 °C)-99.2 °F (37.3 °C)] 96.9 °F (36.1 °C)  Heart Rate:  [57-88] 76  Resp:  [16-20] 20  BP: (118-147)/(82-94) 147/94  Flow (L/min):  [1-4] 1      Physical Exam:  Constitutional: No acute distress, awake, alert,well appearing  HENT: NCAT, mucous  membranes moist  Respiratory: CTAB, respiratory effort normal  Cardiovascular: RRR, no murmurs, rubs, or gallops  Gastrointestinal: Positive bowel sounds, soft, nontender, nondistended  Musculoskeletal: No bilateral ankle edema  Psychiatric: Appropriate affect, cooperative  Neurologic: Oriented x 3, strength symmetric in all extremities, Cranial Nerves grossly intact to confrontation, speech clear  Skin: No rashes    Pertinent  and/or Most Recent Results     LAB RESULTS:      Lab 02/12/24  0600 02/11/24  1618   WBC 4.68 6.00   HEMOGLOBIN 12.6* 13.6   HEMATOCRIT 37.9 41.2   PLATELETS 144 169   NEUTROS ABS 3.98 5.44   IMMATURE GRANS (ABS) 0.02 0.02   LYMPHS ABS 0.26* 0.19*   MONOS ABS 0.37 0.30   EOS ABS 0.03 0.04   MCV 89.6 88.6   PROCALCITONIN  --  0.19   LACTATE  --  1.5         Lab 02/12/24  0600 02/11/24  1618   SODIUM 138 138   POTASSIUM 3.8 3.4*   CHLORIDE 101 98   CO2 28.0 28.0   ANION GAP 9.0 12.0   BUN 16 16   CREATININE 0.73* 0.94   EGFR 101.6 90.5   GLUCOSE 133* 124*   CALCIUM 8.5* 8.9   MAGNESIUM  --  1.6         Lab 02/11/24  1618   TOTAL PROTEIN 7.1   ALBUMIN 4.1   GLOBULIN 3.0   ALT (SGPT) 24   AST (SGOT) 20   BILIRUBIN 0.9   ALK PHOS 80         Lab 02/12/24  0026 02/11/24  2207 02/11/24  1618   PROBNP  --   --  120.0   HSTROP T 24* 22* 28*                 Brief Urine Lab Results       None          Microbiology Results (last 10 days)       Procedure Component Value - Date/Time    Blood Culture - Blood, Arm, Left [[id number]]  (Normal) Collected: 02/11/24 1625    Lab Status: Preliminary result Specimen: Blood from Arm, Left Updated: 02/13/24 1645     Blood Culture No growth at 2 days    Narrative:      Less than seven (7) mL's of blood was collected.  Insufficient quantity may yield false negative results.    Blood Culture - Blood, Arm, Right [[id number]]  (Normal) Collected: 02/11/24 1618    Lab Status: Preliminary result Specimen: Blood from Arm, Right Updated: 02/13/24 1645     Blood Culture No growth  at 2 days    Narrative:      Less than seven (7) mL's of blood was collected.  Insufficient quantity may yield false negative results.    COVID PRE-OP / PRE-PROCEDURE SCREENING ORDER (NO ISOLATION) - Swab, Nasopharynx [[id number]]  (Abnormal) Collected: 02/11/24 1617    Lab Status: Final result Specimen: Swab from Nasopharynx Updated: 02/11/24 1739    Narrative:      The following orders were created for panel order COVID PRE-OP / PRE-PROCEDURE SCREENING ORDER (NO ISOLATION) - Swab, Nasopharynx.  Procedure                               Abnormality         Status                     ---------                               -----------         ------                     COVID-19 and FLU A/B PCR...[[id number]]  Abnormal            Final result                 Please view results for these tests on the individual orders.    COVID-19 and FLU A/B PCR, 1 HR TAT - Swab, Nasopharynx [[id number]]  (Abnormal) Collected: 02/11/24 1617    Lab Status: Final result Specimen: Swab from Nasopharynx Updated: 02/11/24 1739     COVID19 Not Detected     Influenza A PCR Detected     Influenza B PCR Not Detected    Narrative:      Fact sheet for providers: https://www.fda.gov/media/532881/download    Fact sheet for patients: https://www.fda.gov/media/535817/download    Test performed by PCR.            CT Angiogram Chest    Result Date: 2/11/2024  CT ANGIOGRAM CHEST Date of Exam: 2/11/2024 6:13 PM EST Indication: acute sob/hypoxia, eval for pe. Comparison: 1/2/2021 Technique: CTA of the chest was performed after the uneventful intravenous administration of 85 cc Isovue-370 . Reconstructed coronal and sagittal images were also obtained. In addition, a 3-D volume rendered image was created for interpretation. Automated exposure control and iterative reconstruction methods were used. Findings: The thyroid gland is normal. The subglottic airway is clear. No evidence of aortic dissection. The pulmonary arteries are clear. No pulmonary embolus.  Mild atheromatous disease of the coronary arteries. No consolidation. No pneumothorax or pleural effusion. No adenopathy. Limited evaluation of the upper abdomen appears normal. Thoracic vertebral body height and alignment are normal. No lytic or blastic disease.     No pulmonary embolus. No acute cardiopulmonary disease. Electronically Signed: Nile Cruz MD  2/11/2024 6:39 PM EST  Workstation ID: [id number]    XR Chest 1 View    Result Date: 2/11/2024  XR CHEST 1 VW Date of Exam: 2/11/2024 4:02 PM EST Indication: SOA triage protocol Comparison: CT chest January 2, 2021 Findings: The heart is magnified. There is some elevation right hemidiaphragm. There is no bridgett consolidation or obvious pleural effusions.     Impression: 1.No definite acute pulmonary process. 2.Some elevation right hemidiaphragm which has been suggested. Electronically Signed: Vu Goyal MD  2/11/2024 4:22 PM EST  Workstation ID: [id number]     Results for orders placed during the hospital encounter of 09/17/19    Duplex Venous Lower Extremity - Right CAR    Interpretation Summary  · Acute right lower extremity deep vein thrombosis noted in the gastrocnemius/soleal.  · All other right sided veins appeared normal.      Results for orders placed during the hospital encounter of 09/17/19    Duplex Venous Lower Extremity - Right CAR    Interpretation Summary  · Acute right lower extremity deep vein thrombosis noted in the gastrocnemius/soleal.  · All other right sided veins appeared normal.      Results for orders placed during the hospital encounter of 01/02/21    Transthoracic Echo Complete With Contrast if Necessary Per Protocol    Interpretation Summary  · Estimated left ventricular EF = 60% Left ventricular systolic function is normal.    Indications  Chest pain    Technically satisfactory study.  Mitral valve is structurally normal.  Tricuspid valve is structurally normal.  Aortic valve is structurally normal.  Pulmonic valve could not be well  visualized.  No evidence for mitral tricuspid or aortic regurgitation is seen by Doppler study.  Left atrium is normal in size.  Right atrium is normal in size.  Left ventricle is normal in size and contractility with ejection fraction of 60%.  Right ventricle is normal in size.  Atrial septum is intact.  Aorta is normal.  No pericardial effusion or intracardiac thrombus is seen.    Impression  Structurally and functionally normal cardiac valves.  Left ventricular size and contractility is normal with ejection fraction of 60%.      Plan for Follow-up of Pending Labs/Results: We will call with abnormal results.    Pending Labs       Order Current Status    Blood Culture - Blood, Arm, Left Preliminary result    Blood Culture - Blood, Arm, Right Preliminary result          Discharge Details        Discharge Medications        New Medications        Instructions Start Date   oseltamivir 75 MG capsule  Commonly known as: TAMIFLU   75 mg, Oral, Every 12 Hours Scheduled             Continue These Medications        Instructions Start Date   allopurinol 300 MG tablet  Commonly known as: ZYLOPRIM   300 mg, Oral, Daily      amLODIPine 2.5 MG tablet  Commonly known as: NORVASC   2.5 mg, Oral, Daily      cyclobenzaprine 10 MG tablet  Commonly known as: FLEXERIL   10 mg, Oral, 3 Times Daily PRN      Flonase 50 MCG/ACT nasal spray  Generic drug: fluticasone   1 spray, Nasal, As Needed      hydroCHLOROthiazide 25 MG tablet   25 mg, Oral, Daily      montelukast 10 MG tablet  Commonly known as: SINGULAIR   10 mg, Oral, Nightly      omeprazole 40 MG capsule  Commonly known as: priLOSEC   40 mg, Oral, Daily               Allergies   Allergen Reactions    Other Unknown - High Severity     Catgut sutures  Catgut sutures      Sulfa Antibiotics Rash    Suture Rash     Cat gut suture         Discharge Disposition:  Home or Self Care    Diet:  Hospital:  Diet Order   Procedures    Diet: Cardiac Diets; Healthy Heart (2-3 Na+); Texture:  Regular Texture (IDDSI 7); Fluid Consistency: Thin (IDDSI 0)            Activity:  Activity Instructions       Activity as Tolerated              Restrictions or Other Recommendations:  Return to work once clinically improved and remain without fever.       CODE STATUS:    Code Status and Medical Interventions:   Ordered at: 02/11/24 2132     Level Of Support Discussed With:    Patient     Code Status (Patient has no pulse and is not breathing):    CPR (Attempt to Resuscitate)     Medical Interventions (Patient has pulse or is breathing):    Full Support       No future appointments.    Additional Instructions for the Follow-ups that You Need to Schedule       Discharge Follow-up with PCP   As directed       Currently Documented PCP:    Job Santana MD    PCP Phone Number:    [phone]     Follow Up Details: as needed for hospital follow up                      Adriana Cardoza MD  02/14/24      Time Spent on Discharge:  I spent  25  minutes on this discharge activity which included: face-to-face encounter with the patient, reviewing the data in the system, coordination of the care with the nursing staff as well as consultants, documentation, and entering orders.

## 2024-02-14 NOTE — PLAN OF CARE
Problem: Adult Inpatient Plan of Care  Goal: Plan of Care Review  Outcome: Ongoing, Progressing  Flowsheets  Taken 2/13/2024 2201 by Pennie Arvizu RN  Progress: improving  Taken 2/12/2024 0659 by Susi Campbell RN  Plan of Care Reviewed With: patient  Goal: Absence of Hospital-Acquired Illness or Injury  Outcome: Ongoing, Progressing  Intervention: Identify and Manage Fall Risk  Recent Flowsheet Documentation  Taken 2/13/2024 2044 by Pennie Arvizu RN  Safety Promotion/Fall Prevention:   activity supervised   assistive device/personal items within reach   clutter free environment maintained   elopement precautions   fall prevention program maintained   nonskid shoes/slippers when out of bed   room organization consistent   safety round/check completed  Taken 2/13/2024 1939 by Pennie Arvizu RN  Safety Promotion/Fall Prevention:   activity supervised   assistive device/personal items within reach   clutter free environment maintained   elopement precautions   fall prevention program maintained   nonskid shoes/slippers when out of bed   room organization consistent   safety round/check completed  Intervention: Prevent Skin Injury  Recent Flowsheet Documentation  Taken 2/13/2024 2044 by Pennie Arvizu RN  Body Position: position changed independently  Taken 2/13/2024 1939 by Pennie Arvizu RN  Body Position: position changed independently  Skin Protection:   adhesive use limited   tubing/devices free from skin contact  Intervention: Prevent and Manage VTE (Venous Thromboembolism) Risk  Recent Flowsheet Documentation  Taken 2/13/2024 1939 by Pennie Arvizu RN  Activity Management: up ad ivette  VTE Prevention/Management: (Lovenox) other (see comments)  Intervention: Prevent Infection  Recent Flowsheet Documentation  Taken 2/13/2024 2044 by Pennie Arvizu RN  Infection Prevention: rest/sleep promoted  Taken 2/13/2024 1939 by Pennie Arvizu RN  Infection Prevention: rest/sleep promoted  Goal:  Optimal Comfort and Wellbeing  Outcome: Ongoing, Progressing  Intervention: Provide Person-Centered Care  Recent Flowsheet Documentation  Taken 2/13/2024 1939 by Pennie Arvizu RN  Trust Relationship/Rapport:   care explained   choices provided   emotional support provided   empathic listening provided   questions answered   questions encouraged   reassurance provided   thoughts/feelings acknowledged     Problem: Gas Exchange Impaired  Goal: Optimal Gas Exchange  Outcome: Ongoing, Progressing  Intervention: Optimize Oxygenation and Ventilation  Recent Flowsheet Documentation  Taken 2/13/2024 2044 by Pennie Arvizu, RN  Head of Bed (HOB) Positioning: HOB elevated  Taken 2/13/2024 1939 by Pennie Arvizu, RN  Head of Bed (HOB) Positioning: HOB elevated   Goal Outcome Evaluation:           Progress: improving

## 2024-02-14 NOTE — PLAN OF CARE
Goal Outcome Evaluation:        Pt admitted for SOA , acute respiraory failure and hypoxia. Pt was diagnosed with Flu A. PT has been able to be weaned off oxygen and oxygen sats stayed above 90% except for once it dipped down to 88% for a second and returned to > 90-94%.. Pt was ambulating in the [place] today and heart rate jumped to 126 for a few seconds then returned to the 80-'s  . He denied any SOA or CP. Pt has had a right bundle branch block on tele today which was also noted on his initial EKG. PT denies any cardiac history. Dr Edouard was notified of the above mentioned and rerquests patient follow up with his PCP within one week after  discharge. Pt was encouraged to monitor oxygen saturations at home   and will notify his PCP if he becomes SOA or sats drop below 91-90. IF sats drop into the 80's and/or if he gets progressively weaker, febrile (100.5 or higher) or any other acute changes.. he will return to the ER . Pt was instructed on using his I/S for pulmonary hygeine and continue on tamiflu.

## 2024-02-14 NOTE — NURSING NOTE
Pt is A&Ox4. Up independently in room. UO appropriate per pt. No complaints of pain. Droplet precautions maintained. 2L NC. CPAP at HS. VSS. NSR on tele.     MADELINE Arvizu RN

## 2024-02-15 ENCOUNTER — READMISSION MANAGEMENT (OUTPATIENT)
Dept: CALL CENTER | Facility: HOSPITAL | Age: 64
End: 2024-02-15
Payer: COMMERCIAL

## 2024-02-15 NOTE — PROGRESS NOTES
"Enter Query Response Below      Query Response: Acute hypoxic respiratory failure was supported with additional clinical indicators: worsening hypoxia throughout hospitalization    Electronically signed by Adriana Cardoza MD, 02/15/24, 5:08 PM EST.               If applicable, please update the problem list.     Patient: Nile Chin        : 1960  Account: [id number]           Admit Date: 2024        How to Respond to this query:       a. Click New Note     b. Answer query within the yellow box.                c. Update the Problem List, if applicable.      If you have any questions about this query contact me at: [email or web address]     Dr. Cardoza,     Per discharge summary, 63-year-old male \"who presented with complaint of worsening shortness of breath and Pt dx with influenza.\"  Acute hypoxic respiratory failure was documented throughout the chart. H&P documents \"Upon arrival his RA oxygen saturation was 80%.\" The patient has documented initial O2 sat in ER was 91% on 2L, O2 2L with sats 90-95%, O2 titrated up to 5L with sat 89-98% then weaned to 1-2L with sats 91-96% and to room air with sat 92%. There was no documentation of respiratory distress, accessory muscle use, increase work of breathing or ABGs. The patient was treated with Tamiflu, DuoNeb and monitoring saturations.    After study, was acute hypoxic respiratory failure clinically supported during this admission?    - Acute hypoxic respiratory failure was supported with additional clinical indicators: ____________  - Acute hypoxic respiratory failure was not supported  - Other- specify _____________  - Unable to determine    By submitting this query, we are merely seeking further clarification of documentation to accurately reflect all conditions that you are monitoring, evaluating, treating or that extend the hospitalization or utilize additional resources of care. Please utilize your independent clinical judgment " when addressing the question(s) above.     This query and your response, once completed, will be entered into the legal medical record.    Sincerely,  Ambrosio Jimenez RN   Clinical Documentation Integrity Program

## 2024-02-16 LAB
BACTERIA SPEC AEROBE CULT: NORMAL
BACTERIA SPEC AEROBE CULT: NORMAL

## 2024-02-19 ENCOUNTER — READMISSION MANAGEMENT (OUTPATIENT)
Dept: CALL CENTER | Facility: HOSPITAL | Age: 64
End: 2024-02-19
Payer: COMMERCIAL

## 2024-02-19 NOTE — OUTREACH NOTE
Medical Week 1 Survey      Flowsheet Row Responses   [facility] patient discharged from? [place]   Does the patient have one of the following disease processes/diagnoses(primary or secondary)? Other   Week 1 attempt successful? No   Unsuccessful attempts Attempt 1            SHANNAN DUMAS - Registered Nurse

## 2024-02-21 ENCOUNTER — READMISSION MANAGEMENT (OUTPATIENT)
Dept: CALL CENTER | Facility: HOSPITAL | Age: 64
End: 2024-02-21
Payer: COMMERCIAL

## 2024-02-21 NOTE — OUTREACH NOTE
Medical Week 1 Survey      Flowsheet Row Responses   [facility] patient discharged from? [place]   Does the patient have one of the following disease processes/diagnoses(primary or secondary)? Other   Week 1 attempt successful? Yes   Call start time 1210   Call end time 1211   Discharge diagnosis Hypoxia   Person spoke with today (if not patient) and relationship patient   Meds reviewed with patient/caregiver? Yes   Is the patient having any side effects they believe may be caused by any medication additions or changes? No   Does the patient have all medications ordered at discharge? Yes   Is the patient taking all medications as directed (includes completed medication regime)? Yes   Medication comments Patient was able to obtain Tamiflu and taken as directed .   Comments regarding appointments Patient has a followup with Dr Santana tomorrow. 2/22/24   Does the patient have a primary care provider?  Yes   Does the patient have an appointment with their PCP within 7 days of discharge? Yes   Has the patient kept scheduled appointments due by today? Yes   Psychosocial issues? No   Did the patient receive a copy of their discharge instructions? Yes   Nursing interventions Reviewed instructions with patient   What is the patient's perception of their health status since discharge? Improving   Is the patient/caregiver able to teach back signs and symptoms related to disease process for when to call PCP? Yes   Is the patient/caregiver able to teach back signs and symptoms related to disease process for when to call 911? Yes   Is the patient/caregiver able to teach back the hierarchy of who to call/visit for symptoms/problems? PCP, Specialist, Home health nurse, Urgent Care, ED, 911 Yes   If the patient is a current smoker, are they able to teach back resources for cessation? Not a smoker   Week 1 call completed? Yes   Graduated Yes   Graduated/Revoked comments Patient is doing well. No questions at this time.    Call end time 1211            William W - Registered Nurse

## 2025-05-21 ENCOUNTER — APPOINTMENT (OUTPATIENT)
Dept: GENERAL RADIOLOGY | Facility: HOSPITAL | Age: 65
End: 2025-05-21
Payer: MEDICARE

## 2025-05-21 ENCOUNTER — HOSPITAL ENCOUNTER (EMERGENCY)
Facility: HOSPITAL | Age: 65
Discharge: HOME | End: 2025-05-21
Attending: EMERGENCY MEDICINE | Admitting: EMERGENCY MEDICINE
Payer: MEDICARE

## 2025-05-21 ENCOUNTER — APPOINTMENT (OUTPATIENT)
Dept: CT IMAGING | Facility: HOSPITAL | Age: 65
End: 2025-05-21
Payer: MEDICARE

## 2025-05-21 VITALS
TEMPERATURE: 98.2 F | DIASTOLIC BLOOD PRESSURE: 78 MMHG | SYSTOLIC BLOOD PRESSURE: 118 MMHG | RESPIRATION RATE: 22 BRPM | HEART RATE: 77 BPM | BODY MASS INDEX: 35.74 KG/M2 | WEIGHT: 255.29 LBS | HEIGHT: 71 IN | OXYGEN SATURATION: 93 %

## 2025-05-21 DIAGNOSIS — R10.84 GENERALIZED ABDOMINAL PAIN: ICD-10-CM

## 2025-05-21 DIAGNOSIS — R42 DIZZINESS: Primary | ICD-10-CM

## 2025-05-21 LAB
ALBUMIN SERPL-MCNC: 4.4 G/DL (ref 3.5–5.2)
ALBUMIN/GLOB SERPL: 1.5 G/DL
ALP SERPL-CCNC: 98 U/L (ref 39–117)
ALT SERPL W P-5'-P-CCNC: 19 U/L (ref 1–41)
ANION GAP SERPL CALCULATED.3IONS-SCNC: 13.9 MMOL/L (ref 5–15)
AST SERPL-CCNC: 16 U/L (ref 1–40)
BASOPHILS # BLD AUTO: 0.03 10*3/MM3 (ref 0–0.2)
BASOPHILS NFR BLD AUTO: 0.4 % (ref 0–1.5)
BILIRUB SERPL-MCNC: 0.5 MG/DL (ref 0–1.2)
BILIRUB UR QL STRIP: NEGATIVE
BUN SERPL-MCNC: 20 MG/DL (ref 8–23)
BUN/CREAT SERPL: 23.8 (ref 7–25)
CALCIUM SPEC-SCNC: 9.9 MG/DL (ref 8.6–10.5)
CHLORIDE SERPL-SCNC: 97 MMOL/L (ref 98–107)
CLARITY UR: CLEAR
CO2 SERPL-SCNC: 27.1 MMOL/L (ref 22–29)
COLOR UR: YELLOW
CREAT SERPL-MCNC: 0.84 MG/DL (ref 0.76–1.27)
DEPRECATED RDW RBC AUTO: 46.6 FL (ref 37–54)
EGFRCR SERPLBLD CKD-EPI 2021: 96.8 ML/MIN/1.73
EOSINOPHIL # BLD AUTO: 0.1 10*3/MM3 (ref 0–0.4)
EOSINOPHIL NFR BLD AUTO: 1.3 % (ref 0.3–6.2)
ERYTHROCYTE [DISTWIDTH] IN BLOOD BY AUTOMATED COUNT: 14.5 % (ref 12.3–15.4)
GEN 5 1HR TROPONIN T REFLEX: 25 NG/L
GLOBULIN UR ELPH-MCNC: 2.9 GM/DL
GLUCOSE SERPL-MCNC: 157 MG/DL (ref 65–99)
GLUCOSE UR STRIP-MCNC: NEGATIVE MG/DL
HCT VFR BLD AUTO: 42.8 % (ref 37.5–51)
HGB BLD-MCNC: 14.1 G/DL (ref 13–17.7)
HGB UR QL STRIP.AUTO: NEGATIVE
HOLD SPECIMEN: NORMAL
HOLD SPECIMEN: NORMAL
IMM GRANULOCYTES # BLD AUTO: 0.02 10*3/MM3 (ref 0–0.05)
IMM GRANULOCYTES NFR BLD AUTO: 0.3 % (ref 0–0.5)
KETONES UR QL STRIP: NEGATIVE
LEUKOCYTE ESTERASE UR QL STRIP.AUTO: NEGATIVE
LYMPHOCYTES # BLD AUTO: 0.85 10*3/MM3 (ref 0.7–3.1)
LYMPHOCYTES NFR BLD AUTO: 10.7 % (ref 19.6–45.3)
MAGNESIUM SERPL-MCNC: 2.1 MG/DL (ref 1.6–2.4)
MCH RBC QN AUTO: 29 PG (ref 26.6–33)
MCHC RBC AUTO-ENTMCNC: 32.9 G/DL (ref 31.5–35.7)
MCV RBC AUTO: 87.9 FL (ref 79–97)
MONOCYTES # BLD AUTO: 0.46 10*3/MM3 (ref 0.1–0.9)
MONOCYTES NFR BLD AUTO: 5.8 % (ref 5–12)
NEUTROPHILS NFR BLD AUTO: 6.46 10*3/MM3 (ref 1.7–7)
NEUTROPHILS NFR BLD AUTO: 81.5 % (ref 42.7–76)
NITRITE UR QL STRIP: NEGATIVE
NRBC BLD AUTO-RTO: 0 /100 WBC (ref 0–0.2)
PH UR STRIP.AUTO: 6.5 [PH] (ref 5–8)
PLATELET # BLD AUTO: 219 10*3/MM3 (ref 140–450)
PMV BLD AUTO: 10.1 FL (ref 6–12)
POTASSIUM SERPL-SCNC: 3.4 MMOL/L (ref 3.5–5.2)
PROT SERPL-MCNC: 7.3 G/DL (ref 6–8.5)
PROT UR QL STRIP: NEGATIVE
QT INTERVAL: 397 MS
QTC INTERVAL: 459 MS
RBC # BLD AUTO: 4.87 10*6/MM3 (ref 4.14–5.8)
SODIUM SERPL-SCNC: 138 MMOL/L (ref 136–145)
SP GR UR STRIP: >1.03 (ref 1–1.03)
TROPONIN T % DELTA: 4
TROPONIN T NUMERIC DELTA: 1 NG/L
TROPONIN T SERPL HS-MCNC: 24 NG/L
UROBILINOGEN UR QL STRIP: ABNORMAL
WBC NRBC COR # BLD AUTO: 7.92 10*3/MM3 (ref 3.4–10.8)
WHOLE BLOOD HOLD COAG: NORMAL
WHOLE BLOOD HOLD SPECIMEN: NORMAL

## 2025-05-21 PROCEDURE — 93005 ELECTROCARDIOGRAM TRACING: CPT | Performed by: EMERGENCY MEDICINE

## 2025-05-21 PROCEDURE — 84484 ASSAY OF TROPONIN QUANT: CPT

## 2025-05-21 PROCEDURE — 36415 COLL VENOUS BLD VENIPUNCTURE: CPT | Performed by: EMERGENCY MEDICINE

## 2025-05-21 PROCEDURE — 81003 URINALYSIS AUTO W/O SCOPE: CPT | Performed by: EMERGENCY MEDICINE

## 2025-05-21 PROCEDURE — 93005 ELECTROCARDIOGRAM TRACING: CPT

## 2025-05-21 PROCEDURE — 71275 CT ANGIOGRAPHY CHEST: CPT

## 2025-05-21 PROCEDURE — 99285 EMERGENCY DEPT VISIT HI MDM: CPT

## 2025-05-21 PROCEDURE — 83735 ASSAY OF MAGNESIUM: CPT

## 2025-05-21 PROCEDURE — 25510000001 IOPAMIDOL PER 1 ML: Performed by: EMERGENCY MEDICINE

## 2025-05-21 PROCEDURE — 85025 COMPLETE CBC W/AUTO DIFF WBC: CPT

## 2025-05-21 PROCEDURE — 80053 COMPREHEN METABOLIC PANEL: CPT

## 2025-05-21 PROCEDURE — 84484 ASSAY OF TROPONIN QUANT: CPT | Performed by: EMERGENCY MEDICINE

## 2025-05-21 PROCEDURE — 71045 X-RAY EXAM CHEST 1 VIEW: CPT

## 2025-05-21 PROCEDURE — 74177 CT ABD & PELVIS W/CONTRAST: CPT

## 2025-05-21 RX ORDER — IOPAMIDOL 755 MG/ML
100 INJECTION, SOLUTION INTRAVASCULAR
Status: COMPLETED | OUTPATIENT
Start: 2025-05-21 | End: 2025-05-21

## 2025-05-21 RX ORDER — ONDANSETRON 4 MG/1
4 TABLET, ORALLY DISINTEGRATING ORAL 4 TIMES DAILY PRN
Qty: 20 TABLET | Refills: 0 | Status: SHIPPED | OUTPATIENT
Start: 2025-05-21

## 2025-05-21 RX ORDER — ROSUVASTATIN CALCIUM 10 MG/1
10 TABLET, COATED ORAL DAILY
COMMUNITY

## 2025-05-21 RX ORDER — DICYCLOMINE HCL 20 MG
20 TABLET ORAL EVERY 8 HOURS PRN
Qty: 20 TABLET | Refills: 0 | Status: SHIPPED | OUTPATIENT
Start: 2025-05-21

## 2025-05-21 RX ORDER — SODIUM CHLORIDE 0.9 % (FLUSH) 0.9 %
10 SYRINGE (ML) INJECTION AS NEEDED
Status: DISCONTINUED | OUTPATIENT
Start: 2025-05-21 | End: 2025-05-21 | Stop reason: HOSPADM

## 2025-05-21 RX ORDER — SILDENAFIL 100 MG/1
100 TABLET, FILM COATED ORAL
COMMUNITY
Start: 2024-12-19

## 2025-05-21 RX ADMIN — IOPAMIDOL 100 ML: 755 INJECTION, SOLUTION INTRAVENOUS at 08:30

## 2025-05-21 NOTE — ED PROVIDER NOTES
Time: 7:55 AM EDT  Date of encounter:  5/21/2025  Room number: 13/13  Independent Historian/Clinical History and Information was obtained by:   Patient    History is limited by: N/A    Chief Complaint: abdominal pain with dizziness and SOA      History of Present Illness:  Patient is a 64 y.o. year old male who presents to the emergency department for evaluation of dizziness, shortness of air, and abdominal pain.  Patient was pumping gas and states the pain and dizziness came on abruptly.  It was accompanied by shortness of air however no chest pain and no diaphoresis was reported.  He was driving from [place] to [place] and describes becoming very fatigued and sleepy and pulled over to take a rest however he could not sleep due to being uncomfortable and then pumped gas and had the sudden abdominal pain and shortness of air.  He called 911 and was then transported to the emergency department.  Denies any recent illnesses and has had no known nausea, vomiting, diarrhea, fever, or chills.    HPI    Patient Care Team  Primary Care Provider: Job Santana MD    Past Medical History:     Allergies   Allergen Reactions    Other Unknown - High Severity     Catgut sutures  Catgut sutures      Sulfa Antibiotics Rash    Suture Rash     Cat gut suture     Past Medical History:   Diagnosis Date    Allergies     GERD (gastroesophageal reflux disease)     Gout     Hypertension     Melanoma     Pyloric stenosis      Past Surgical History:   Procedure Laterality Date    APPENDECTOMY       Family History   Problem Relation Age of Onset    COPD Mother     Cancer Father        Home Medications:  Prior to Admission medications    Medication Sig Start Date End Date Taking? Authorizing Provider   sildenafil (VIAGRA) 100 MG tablet Take 1 tablet by mouth. 12/19/24  Yes Lisa Hunter MD   allopurinol (ZYLOPRIM) 300 MG tablet Take 1 tablet by mouth Daily. 12/9/11   Lisa Hunter MD   amLODIPine (NORVASC) 2.5 MG  "tablet Take 1 tablet by mouth Daily.    Lisa Hunter MD   cyclobenzaprine (FLEXERIL) 10 MG tablet Take 1 tablet by mouth 3 (Three) Times a Day As Needed for Muscle Spasms.    Lisa Hunter MD   fluticasone (FLONASE) 50 MCG/ACT nasal spray 1 spray into the nostril(s) as directed by provider As Needed. 12/9/11   Lisa Hunter MD   hydroCHLOROthiazide 25 MG tablet Take 1 tablet by mouth Daily.    Lisa Hunetr MD   montelukast (SINGULAIR) 10 MG tablet Take 1 tablet by mouth Every Night.    Lisa Hunter MD   omeprazole (priLOSEC) 40 MG capsule Take 1 capsule by mouth Daily.    Lisa Hunter MD   rosuvastatin (CRESTOR) 10 MG tablet Take 1 tablet by mouth Daily.    Lisa Hunter MD        Social History:   Social History     Tobacco Use    Smoking status: Never    Smokeless tobacco: Never   Vaping Use    Vaping status: Never Used   Substance Use Topics    Alcohol use: No    Drug use: No         Review of Systems:  Review of Systems     I performed a review of systems today, which was all negative, except for the positives found in the HPI above.    Physical Exam:  /78   Pulse 77   Temp 98.2 °F (36.8 °C) (Oral)   Resp 22   Ht 180.3 cm (71\")   Wt 116 kg (255 lb 4.7 oz)   SpO2 93%   BMI 35.61 kg/m²     Physical Exam   General:  Awake alert no apparent distress    Head: Normocephalic, atraumatic, eyes PERRLA EOMI, nose normal, oropharynx normal    Neck: Supple, no meningismus, no cervical lymphadenopathy or JVD    Heart: Regular rate and rhythm, no murmurs or rubs, 2+ radial pulses    Lungs: Clear to auscultation bilaterally, no wheezing or rhonchi or rales, no respiratory distress    Abdomen: Soft, nondistended, no signs of peritonitis, cramping and soreness is described to be global however patient was found to be tender on exam to mid abdominal area.    Skin: Warm, dry, no rash    Musculoskeletal: Normal range of motion, no obvious deformities, no edema " noted    Neurologic: Awake, alert, oriented x 3, no motor or sensory deficits appreciated    Psych: No suicidal or homicidal ideations, no psychosis          Procedures:  Procedures      Medical Decision Making:      Comorbidities that affect care:    Pyloric stenosis hypertension GERD gout allergies melanoma    External Notes reviewed:    Previous Radiological Studies: I reviewed patient's last CT angiogram of the chest which was completed on 2/11/2024.      The following orders were placed and all results were independently analyzed by me:  Orders Placed This Encounter   Procedures    XR Chest 1 View    CT Abdomen Pelvis With Contrast    CT Angiogram Chest Pulmonary Embolism    [place] Draw    Comprehensive Metabolic Panel    High Sensitivity Troponin T    Magnesium    Urinalysis With Microscopic If Indicated (No Culture) - Urine, Clean Catch    CBC Auto Differential    High Sensitivity Troponin T 1Hr    Undress & Gown    Continuous Pulse Oximetry    Vital Signs    POC Glucose Once    ECG 12 Lead Dyspnea    CBC & Differential    Green Top (Gel)    Lavender Top    Gold Top - SST    Light Blue Top       Medications Given in the Emergency Department:  Medications   iopamidol (ISOVUE-370) 76 % injection 100 mL (100 mL Intravenous Given 5/21/25 0830)        ED Course:    ED Course as of 05/21/25 1710   Wed May 21, 2025   1135 Patient updated with information and confirms that he has a primary care provider to follow-up with.  Did offer additional swab/test for COVID, RSV, and flu however patient declined. [MS]      ED Course User Index  [MS] Alysa Allen APRN       Labs:    Lab Results (last 24 hours)       Procedure Component Value Units Date/Time    CBC & Differential [[id number]]  (Abnormal) Collected: 05/21/25 0342    Specimen: Blood from Arm, Right Updated: 05/21/25 0400    Narrative:      The following orders were created for panel order CBC & Differential.  Procedure                                Abnormality         Status                     ---------                               -----------         ------                     CBC Auto Differential[[id number]]        Abnormal            Final result                 Please view results for these tests on the individual orders.    Comprehensive Metabolic Panel [[id number]]  (Abnormal) Collected: 05/21/25 0342    Specimen: Blood from Arm, Right Updated: 05/21/25 0417     Glucose 157 mg/dL      BUN 20 mg/dL      Creatinine 0.84 mg/dL      Sodium 138 mmol/L      Potassium 3.4 mmol/L      Chloride 97 mmol/L      CO2 27.1 mmol/L      Calcium 9.9 mg/dL      Total Protein 7.3 g/dL      Albumin 4.4 g/dL      ALT (SGPT) 19 U/L      AST (SGOT) 16 U/L      Alkaline Phosphatase 98 U/L      Total Bilirubin 0.5 mg/dL      Globulin 2.9 gm/dL      A/G Ratio 1.5 g/dL      BUN/Creatinine Ratio 23.8     Anion Gap 13.9 mmol/L      eGFR 96.8 mL/min/1.73     Narrative:      GFR Categories in Chronic Kidney Disease (CKD)              GFR Category          GFR (mL/min/1.73)    Interpretation  G1                    90 or greater        Normal or high (1)  G2                    60-89                Mild decrease (1)  G3a                   45-59                Mild to moderate decrease  G3b                   30-44                Moderate to severe decrease  G4                    15-29                Severe decrease  G5                    14 or less           Kidney failure    (1)In the absence of evidence of kidney disease, neither GFR category G1 or G2 fulfill the criteria for CKD.    eGFR calculation 2021 CKD-EPI creatinine equation, which does not include race as a factor    High Sensitivity Troponin T [[id number]]  (Abnormal) Collected: 05/21/25 0342    Specimen: Blood from Arm, Right Updated: 05/21/25 0417     HS Troponin T 24 ng/L     Narrative:      High Sensitive Troponin T Reference Range:  <14.0 ng/L- Negative Female for AMI  <22.0 ng/L- Negative Male for AMI  >=14 - Abnormal  Female indicating possible myocardial injury.  >=22 - Abnormal Male indicating possible myocardial injury.   Clinicians would have to utilize clinical acumen, EKG, Troponin, and serial changes to determine if it is an Acute Myocardial Infarction or myocardial injury due to an underlying chronic condition.         Magnesium [[id number]]  (Normal) Collected: 05/21/25 0342    Specimen: Blood from Arm, Right Updated: 05/21/25 0417     Magnesium 2.1 mg/dL     CBC Auto Differential [[id number]]  (Abnormal) Collected: 05/21/25 0342    Specimen: Blood from Arm, Right Updated: 05/21/25 0400     WBC 7.92 10*3/mm3      RBC 4.87 10*6/mm3      Hemoglobin 14.1 g/dL      Hematocrit 42.8 %      MCV 87.9 fL      MCH 29.0 pg      MCHC 32.9 g/dL      RDW 14.5 %      RDW-SD 46.6 fl      MPV 10.1 fL      Platelets 219 10*3/mm3      Neutrophil % 81.5 %      Lymphocyte % 10.7 %      Monocyte % 5.8 %      Eosinophil % 1.3 %      Basophil % 0.4 %      Immature Grans % 0.3 %      Neutrophils, Absolute 6.46 10*3/mm3      Lymphocytes, Absolute 0.85 10*3/mm3      Monocytes, Absolute 0.46 10*3/mm3      Eosinophils, Absolute 0.10 10*3/mm3      Basophils, Absolute 0.03 10*3/mm3      Immature Grans, Absolute 0.02 10*3/mm3      nRBC 0.0 /100 WBC     High Sensitivity Troponin T 1Hr [[id number]]  (Abnormal) Collected: 05/21/25 0811    Specimen: Blood from Arm, Left Updated: 05/21/25 0839     HS Troponin T 25 ng/L      Troponin T Numeric Delta 1 ng/L      Troponin T % Delta 4    Narrative:      High Sensitive Troponin T Reference Range:  <14.0 ng/L- Negative Female for AMI  <22.0 ng/L- Negative Male for AMI  >=14 - Abnormal Female indicating possible myocardial injury.  >=22 - Abnormal Male indicating possible myocardial injury.   Clinicians would have to utilize clinical acumen, EKG, Troponin, and serial changes to determine if it is an Acute Myocardial Infarction or myocardial injury due to an underlying chronic condition.         Urinalysis With  Microscopic If Indicated (No Culture) - Urine, Clean Catch [[id number]]  (Abnormal) Collected: 05/21/25 0849    Specimen: Urine, Clean Catch Updated: 05/21/25 0902     Color, UA Yellow     Appearance, UA Clear     pH, UA 6.5     Specific Gravity, UA >1.030     Glucose, UA Negative     Ketones, UA Negative     Bilirubin, UA Negative     Blood, UA Negative     Protein, UA Negative     Leuk Esterase, UA Negative     Nitrite, UA Negative     Urobilinogen, UA 1.0 E.U./dL    Narrative:      Urine microscopic not indicated.             Imaging:    CT Abdomen Pelvis With Contrast  Result Date: 5/21/2025  CT ANGIOGRAM CHEST PULMONARY EMBOLISM, CT ABDOMEN PELVIS W CONTRAST Date of Exam: 5/21/2025 8:21 AM EDT Indication: SOA, with dizziness and prolonged travel. Comparison: Chest CTA dated 2/11/2024, abdominal CT dated 1/2/2021 Technique: Axial CT images were obtained of the chest after the uneventful intravenous administration of iodinated contrast utilizing pulmonary embolism protocol.  Reconstructed coronal and sagittal images were also obtained. In addition, a 3-D volume rendered image was created for interpretation.  Automated exposure control and iterative construction methods were used. FINDINGS: Thoracic inlet: Unremarkable. Pulmonary arteries: No filling defects are identified within the pulmonary arteries to suggest acute pulmonary embolism. Great vessels: Mild atherosclerotic plaque is seen within the thoracic aorta and proximal arch vessels. Mediastinum/Yaneli: No pathologically enlarged mediastinal lymph nodes are seen. The esophagus appears unremarkable. Lung parenchyma: Mosaic pattern of groundglass attenuation noted within both lungs. This could be related to small vessel/small airway disease, mild edema, or atypical infiltrates. Mild right basilar atelectasis. Trachea and airways: The trachea and central airways appear unremarkable. Pleural space: No significant pleural effusion or pneumothorax. Heart and  pericardium: Coronary artery calcifications are present. Otherwise, the heart and pericardium appear unremarkable. Liver: The liver is unremarkable in morphology. No focal liver lesion is seen. No biliary dilation is seen. Gallbladder: Unremarkable. Pancreas: Unremarkable. Spleen: Unremarkable. Adrenal glands: Unremarkable. Genitourinary tract: Small right renal cyst. Additional subcentimeter right renal hypodensity, too small to characterize. There is a 3 mm nonobstructing calculus within the left kidney. No hydronephrosis is seen. The visualized portions of the ureters, urinary bladder, and prostate gland appear unremarkable. Gastrointestinal tract: Scattered colonic diverticulosis. Duodenal diverticulum. Hollow viscera appear otherwise unremarkable. There is no evidence of bowel obstruction. Appendix: Status post appendectomy. Other findings: No free air or free fluid is identified. No pathologically enlarged lymph nodes are seen. Vascular calcifications are present. The IVC is unremarkable. Bones and soft tissues: No acute or suspicious osseous or soft tissue lesion is identified. Bilateral L5 pars defects with minimal anterolisthesis and degenerative changes at L5-S1.     1.No evidence of acute pulmonary embolism. 2.Mosaic pattern of groundglass attenuation noted within both lungs. This could be related to small vessel/small airway disease, mild edema, or atypical infiltrates. 3.No acute abnormality is identified within the abdomen or pelvis. 4.Colonic diverticulosis. 5.Left nonobstructive nephrolithiasis. 6.Additional findings as detailed above. Electronically Signed: Abel Kebede MD  5/21/2025 9:12 AM EDT  Workstation ID: [id number]    CT Angiogram Chest Pulmonary Embolism  Result Date: 5/21/2025  CT ANGIOGRAM CHEST PULMONARY EMBOLISM, CT ABDOMEN PELVIS W CONTRAST Date of Exam: 5/21/2025 8:21 AM EDT Indication: SOA, with dizziness and prolonged travel. Comparison: Chest CTA dated 2/11/2024, abdominal CT  dated 1/2/2021 Technique: Axial CT images were obtained of the chest after the uneventful intravenous administration of iodinated contrast utilizing pulmonary embolism protocol.  Reconstructed coronal and sagittal images were also obtained. In addition, a 3-D volume rendered image was created for interpretation.  Automated exposure control and iterative construction methods were used. FINDINGS: Thoracic inlet: Unremarkable. Pulmonary arteries: No filling defects are identified within the pulmonary arteries to suggest acute pulmonary embolism. Great vessels: Mild atherosclerotic plaque is seen within the thoracic aorta and proximal arch vessels. Mediastinum/Yaneli: No pathologically enlarged mediastinal lymph nodes are seen. The esophagus appears unremarkable. Lung parenchyma: Mosaic pattern of groundglass attenuation noted within both lungs. This could be related to small vessel/small airway disease, mild edema, or atypical infiltrates. Mild right basilar atelectasis. Trachea and airways: The trachea and central airways appear unremarkable. Pleural space: No significant pleural effusion or pneumothorax. Heart and pericardium: Coronary artery calcifications are present. Otherwise, the heart and pericardium appear unremarkable. Liver: The liver is unremarkable in morphology. No focal liver lesion is seen. No biliary dilation is seen. Gallbladder: Unremarkable. Pancreas: Unremarkable. Spleen: Unremarkable. Adrenal glands: Unremarkable. Genitourinary tract: Small right renal cyst. Additional subcentimeter right renal hypodensity, too small to characterize. There is a 3 mm nonobstructing calculus within the left kidney. No hydronephrosis is seen. The visualized portions of the ureters, urinary bladder, and prostate gland appear unremarkable. Gastrointestinal tract: Scattered colonic diverticulosis. Duodenal diverticulum. Hollow viscera appear otherwise unremarkable. There is no evidence of bowel obstruction. Appendix:  Status post appendectomy. Other findings: No free air or free fluid is identified. No pathologically enlarged lymph nodes are seen. Vascular calcifications are present. The IVC is unremarkable. Bones and soft tissues: No acute or suspicious osseous or soft tissue lesion is identified. Bilateral L5 pars defects with minimal anterolisthesis and degenerative changes at L5-S1.     1.No evidence of acute pulmonary embolism. 2.Mosaic pattern of groundglass attenuation noted within both lungs. This could be related to small vessel/small airway disease, mild edema, or atypical infiltrates. 3.No acute abnormality is identified within the abdomen or pelvis. 4.Colonic diverticulosis. 5.Left nonobstructive nephrolithiasis. 6.Additional findings as detailed above. Electronically Signed: Abel Kebede MD  5/21/2025 9:12 AM EDT  Workstation ID: [id number]    XR Chest 1 View  Result Date: 5/21/2025  XR CHEST 1 VW-  Date of exam: 5/21/2025 4:15 AM.  Comparison: 2/11/2024.  INDICATIONS: 64-year-old male with dizziness.  FINDINGS: A single AP (or PA) upright portable chest radiograph was performed. No cardiac enlargement is seen. No acute infiltrate is appreciated. There is pulmonary hypoinflation with mild bilateral subsegmental atelectasis. There is slight asymmetric elevation of the right diaphragm. The thoracic aorta is atherosclerotic. Degenerative changes involve the shoulders. No pleural effusion or pneumothorax is identified. No significant interval change is seen since the prior study (or studies).       No acute infiltrate is appreciated.    Portions of this note were completed with a voice recognition program.  5/21/2025 4:39 AM by Nile Richard MD on Workstation: [id number]          Differential Diagnosis and Discussion:    Abdominal Pain: Based on the patient's signs and symptoms, I considered abdominal aortic aneurysm, small bowel obstruction, pancreatitis, acute cholecystitis, acute appendecitis, peptic ulcer disease,  gastritis, colitis, endocrine disorders, irritable bowel syndrome and other differential diagnosis an etiology of the patient's abdominal pain.  Dyspnea: Differential diagnosis includes but is not limited to metabolic acidosis, neurological disorders, psychogenic, asthma, pneumothorax, upper airway obstruction, COPD, pneumonia, noncardiogenic pulmonary edema, interstitial lung disease, anemia, congestive heart failure, and pulmonary embolism    Labs were collected in the emergency department and all labs were reviewed and interpreted by me.  X-ray were performed in the emergency department and all X-ray impressions were independently interpreted by me.  CT scan was performed in the emergency department and the CT scan radiology impression was interpreted by me.    MDM     Amount and/or Complexity of Data Reviewed  Clinical lab tests: reviewed  Tests in the radiology section of CPT®: reviewed  Tests in the medicine section of CPT®: reviewed  Decide to obtain previous medical records or to obtain history from someone other than the patient: yes                   Patient Care Considerations:    SEPSIS was considered but is NOT present in the emergency department as SIRS criteria is not present.      Consultants/Shared Management Plan:    Discussed this patient with the ED attending Dr. Parks    Social Determinants of Health:    Patient is independent, reliable, and has access to care.       Disposition and Care Coordination:    Discharged: I considered escalation of care by admitting this patient to the hospital, however patient was not septic and there was no pulmonary embolism or concerning abdominal findings on exam and images.        Final diagnoses:   Dizziness   Generalized abdominal pain        ED Disposition       ED Disposition   Discharge    Condition   Stable    Comment   --               This medical record created using voice recognition software.       Alysa Allen, APRN  05/21/25 5209

## 2025-05-21 NOTE — DISCHARGE INSTRUCTIONS
Please know that your CAT scan of your belly today was within normal limits.  Your chest CT also did not show any signs of a blood clot.  It does show some groundglass opacities this is usually assistant with COVID or other airway diseases.  Please follow-up with your primary care provider.  He may want to do a repeat scan to make sure this has resolved or to get a better image.  However today there was no obvious pneumonia, your white blood cell count, or your infection count, was within normal limits, and your cardiac or heart enzymes were also within acceptable limits.  I have prescribed you some medication to help with cramping and spasms.  Please take as needed.  Be sure to stay well-hydrated and drink lots of water as well as electrolyte substances such as Powerade and Gatorade.  If it anytime I have personally reviewed patient's previous medical encounters.  Experience any fainting episodes, a fever that you cannot control Tylenol or Motrin, or severe nausea, vomiting, or diarrhea please return to the emergency department otherwise follow-up with your primary care provider as soon as possible

## 2025-05-21 NOTE — ED PROVIDER NOTES
"SHARED VISIT ATTESTATION:    This visit was performed by myself and an APC.  I performed the substantive portion of the medical decision making.  The management plan was made or approved by me, and I take responsibility for patient management.      SHARED VISIT NOTE:    Patient is 64 y.o. year old male that presents to the ED for evaluation of dizziness and abdominal pain.     Physical Exam    ED Course:    /78   Pulse 77   Temp 98.2 °F (36.8 °C) (Oral)   Resp 22   Ht 180.3 cm (71\")   Wt 116 kg (255 lb 4.7 oz)   SpO2 93%   BMI 35.61 kg/m²       The following orders were placed and all results were independently analyzed by me:  Orders Placed This Encounter   Procedures    XR Chest 1 View    CT Abdomen Pelvis With Contrast    CT Angiogram Chest Pulmonary Embolism    [place] Draw    Comprehensive Metabolic Panel    High Sensitivity Troponin T    Magnesium    Urinalysis With Microscopic If Indicated (No Culture) - Urine, Clean Catch    CBC Auto Differential    High Sensitivity Troponin T 1Hr    Undress & Gown    Continuous Pulse Oximetry    Vital Signs    POC Glucose Once    ECG 12 Lead Dyspnea    CBC & Differential    Green Top (Gel)    Lavender Top    Gold Top - SST    Light Blue Top       Medications Given in the Emergency Department:  Medications   iopamidol (ISOVUE-370) 76 % injection 100 mL (100 mL Intravenous Given 5/21/25 0830)        ED Course:    ED Course as of 05/21/25 1548   Wed May 21, 2025   1135 Patient updated with information and confirms that he has a primary care provider to follow-up with.  Did offer additional swab/test for COVID, RSV, and flu however patient declined. [MS]      ED Course User Index  [MS] Alysa Allen, MONICA       Labs:    Lab Results (last 24 hours)       Procedure Component Value Units Date/Time    CBC & Differential [[id number]]  (Abnormal) Collected: 05/21/25 0342    Specimen: Blood from Arm, Right Updated: 05/21/25 0400    Narrative:      The following " orders were created for panel order CBC & Differential.  Procedure                               Abnormality         Status                     ---------                               -----------         ------                     CBC Auto Differential[[id number]]        Abnormal            Final result                 Please view results for these tests on the individual orders.    Comprehensive Metabolic Panel [[id number]]  (Abnormal) Collected: 05/21/25 0342    Specimen: Blood from Arm, Right Updated: 05/21/25 0417     Glucose 157 mg/dL      BUN 20 mg/dL      Creatinine 0.84 mg/dL      Sodium 138 mmol/L      Potassium 3.4 mmol/L      Chloride 97 mmol/L      CO2 27.1 mmol/L      Calcium 9.9 mg/dL      Total Protein 7.3 g/dL      Albumin 4.4 g/dL      ALT (SGPT) 19 U/L      AST (SGOT) 16 U/L      Alkaline Phosphatase 98 U/L      Total Bilirubin 0.5 mg/dL      Globulin 2.9 gm/dL      A/G Ratio 1.5 g/dL      BUN/Creatinine Ratio 23.8     Anion Gap 13.9 mmol/L      eGFR 96.8 mL/min/1.73     Narrative:      GFR Categories in Chronic Kidney Disease (CKD)              GFR Category          GFR (mL/min/1.73)    Interpretation  G1                    90 or greater        Normal or high (1)  G2                    60-89                Mild decrease (1)  G3a                   45-59                Mild to moderate decrease  G3b                   30-44                Moderate to severe decrease  G4                    15-29                Severe decrease  G5                    14 or less           Kidney failure    (1)In the absence of evidence of kidney disease, neither GFR category G1 or G2 fulfill the criteria for CKD.    eGFR calculation 2021 CKD-EPI creatinine equation, which does not include race as a factor    High Sensitivity Troponin T [[id number]]  (Abnormal) Collected: 05/21/25 0342    Specimen: Blood from Arm, Right Updated: 05/21/25 0417     HS Troponin T 24 ng/L     Narrative:      High Sensitive Troponin T Reference  Range:  <14.0 ng/L- Negative Female for AMI  <22.0 ng/L- Negative Male for AMI  >=14 - Abnormal Female indicating possible myocardial injury.  >=22 - Abnormal Male indicating possible myocardial injury.   Clinicians would have to utilize clinical acumen, EKG, Troponin, and serial changes to determine if it is an Acute Myocardial Infarction or myocardial injury due to an underlying chronic condition.         Magnesium [[id number]]  (Normal) Collected: 05/21/25 0342    Specimen: Blood from Arm, Right Updated: 05/21/25 0417     Magnesium 2.1 mg/dL     CBC Auto Differential [[id number]]  (Abnormal) Collected: 05/21/25 0342    Specimen: Blood from Arm, Right Updated: 05/21/25 0400     WBC 7.92 10*3/mm3      RBC 4.87 10*6/mm3      Hemoglobin 14.1 g/dL      Hematocrit 42.8 %      MCV 87.9 fL      MCH 29.0 pg      MCHC 32.9 g/dL      RDW 14.5 %      RDW-SD 46.6 fl      MPV 10.1 fL      Platelets 219 10*3/mm3      Neutrophil % 81.5 %      Lymphocyte % 10.7 %      Monocyte % 5.8 %      Eosinophil % 1.3 %      Basophil % 0.4 %      Immature Grans % 0.3 %      Neutrophils, Absolute 6.46 10*3/mm3      Lymphocytes, Absolute 0.85 10*3/mm3      Monocytes, Absolute 0.46 10*3/mm3      Eosinophils, Absolute 0.10 10*3/mm3      Basophils, Absolute 0.03 10*3/mm3      Immature Grans, Absolute 0.02 10*3/mm3      nRBC 0.0 /100 WBC     High Sensitivity Troponin T 1Hr [[id number]]  (Abnormal) Collected: 05/21/25 0811    Specimen: Blood from Arm, Left Updated: 05/21/25 0839     HS Troponin T 25 ng/L      Troponin T Numeric Delta 1 ng/L      Troponin T % Delta 4    Narrative:      High Sensitive Troponin T Reference Range:  <14.0 ng/L- Negative Female for AMI  <22.0 ng/L- Negative Male for AMI  >=14 - Abnormal Female indicating possible myocardial injury.  >=22 - Abnormal Male indicating possible myocardial injury.   Clinicians would have to utilize clinical acumen, EKG, Troponin, and serial changes to determine if it is an Acute Myocardial  Infarction or myocardial injury due to an underlying chronic condition.         Urinalysis With Microscopic If Indicated (No Culture) - Urine, Clean Catch [[id number]]  (Abnormal) Collected: 05/21/25 0849    Specimen: Urine, Clean Catch Updated: 05/21/25 0902     Color, UA Yellow     Appearance, UA Clear     pH, UA 6.5     Specific Gravity, UA >1.030     Glucose, UA Negative     Ketones, UA Negative     Bilirubin, UA Negative     Blood, UA Negative     Protein, UA Negative     Leuk Esterase, UA Negative     Nitrite, UA Negative     Urobilinogen, UA 1.0 E.U./dL    Narrative:      Urine microscopic not indicated.             Imaging:    CT Abdomen Pelvis With Contrast  Result Date: 5/21/2025  CT ANGIOGRAM CHEST PULMONARY EMBOLISM, CT ABDOMEN PELVIS W CONTRAST Date of Exam: 5/21/2025 8:21 AM EDT Indication: SOA, with dizziness and prolonged travel. Comparison: Chest CTA dated 2/11/2024, abdominal CT dated 1/2/2021 Technique: Axial CT images were obtained of the chest after the uneventful intravenous administration of iodinated contrast utilizing pulmonary embolism protocol.  Reconstructed coronal and sagittal images were also obtained. In addition, a 3-D volume rendered image was created for interpretation.  Automated exposure control and iterative construction methods were used. FINDINGS: Thoracic inlet: Unremarkable. Pulmonary arteries: No filling defects are identified within the pulmonary arteries to suggest acute pulmonary embolism. Great vessels: Mild atherosclerotic plaque is seen within the thoracic aorta and proximal arch vessels. Mediastinum/Yaneli: No pathologically enlarged mediastinal lymph nodes are seen. The esophagus appears unremarkable. Lung parenchyma: Mosaic pattern of groundglass attenuation noted within both lungs. This could be related to small vessel/small airway disease, mild edema, or atypical infiltrates. Mild right basilar atelectasis. Trachea and airways: The trachea and central airways appear  unremarkable. Pleural space: No significant pleural effusion or pneumothorax. Heart and pericardium: Coronary artery calcifications are present. Otherwise, the heart and pericardium appear unremarkable. Liver: The liver is unremarkable in morphology. No focal liver lesion is seen. No biliary dilation is seen. Gallbladder: Unremarkable. Pancreas: Unremarkable. Spleen: Unremarkable. Adrenal glands: Unremarkable. Genitourinary tract: Small right renal cyst. Additional subcentimeter right renal hypodensity, too small to characterize. There is a 3 mm nonobstructing calculus within the left kidney. No hydronephrosis is seen. The visualized portions of the ureters, urinary bladder, and prostate gland appear unremarkable. Gastrointestinal tract: Scattered colonic diverticulosis. Duodenal diverticulum. Hollow viscera appear otherwise unremarkable. There is no evidence of bowel obstruction. Appendix: Status post appendectomy. Other findings: No free air or free fluid is identified. No pathologically enlarged lymph nodes are seen. Vascular calcifications are present. The IVC is unremarkable. Bones and soft tissues: No acute or suspicious osseous or soft tissue lesion is identified. Bilateral L5 pars defects with minimal anterolisthesis and degenerative changes at L5-S1.     1.No evidence of acute pulmonary embolism. 2.Mosaic pattern of groundglass attenuation noted within both lungs. This could be related to small vessel/small airway disease, mild edema, or atypical infiltrates. 3.No acute abnormality is identified within the abdomen or pelvis. 4.Colonic diverticulosis. 5.Left nonobstructive nephrolithiasis. 6.Additional findings as detailed above. Electronically Signed: Abel Kebede MD  5/21/2025 9:12 AM EDT  Workstation ID: [id number]    CT Angiogram Chest Pulmonary Embolism  Result Date: 5/21/2025  CT ANGIOGRAM CHEST PULMONARY EMBOLISM, CT ABDOMEN PELVIS W CONTRAST Date of Exam: 5/21/2025 8:21 AM EDT Indication: SOA, with  dizziness and prolonged travel. Comparison: Chest CTA dated 2/11/2024, abdominal CT dated 1/2/2021 Technique: Axial CT images were obtained of the chest after the uneventful intravenous administration of iodinated contrast utilizing pulmonary embolism protocol.  Reconstructed coronal and sagittal images were also obtained. In addition, a 3-D volume rendered image was created for interpretation.  Automated exposure control and iterative construction methods were used. FINDINGS: Thoracic inlet: Unremarkable. Pulmonary arteries: No filling defects are identified within the pulmonary arteries to suggest acute pulmonary embolism. Great vessels: Mild atherosclerotic plaque is seen within the thoracic aorta and proximal arch vessels. Mediastinum/Yaneli: No pathologically enlarged mediastinal lymph nodes are seen. The esophagus appears unremarkable. Lung parenchyma: Mosaic pattern of groundglass attenuation noted within both lungs. This could be related to small vessel/small airway disease, mild edema, or atypical infiltrates. Mild right basilar atelectasis. Trachea and airways: The trachea and central airways appear unremarkable. Pleural space: No significant pleural effusion or pneumothorax. Heart and pericardium: Coronary artery calcifications are present. Otherwise, the heart and pericardium appear unremarkable. Liver: The liver is unremarkable in morphology. No focal liver lesion is seen. No biliary dilation is seen. Gallbladder: Unremarkable. Pancreas: Unremarkable. Spleen: Unremarkable. Adrenal glands: Unremarkable. Genitourinary tract: Small right renal cyst. Additional subcentimeter right renal hypodensity, too small to characterize. There is a 3 mm nonobstructing calculus within the left kidney. No hydronephrosis is seen. The visualized portions of the ureters, urinary bladder, and prostate gland appear unremarkable. Gastrointestinal tract: Scattered colonic diverticulosis. Duodenal diverticulum. Hollow viscera  appear otherwise unremarkable. There is no evidence of bowel obstruction. Appendix: Status post appendectomy. Other findings: No free air or free fluid is identified. No pathologically enlarged lymph nodes are seen. Vascular calcifications are present. The IVC is unremarkable. Bones and soft tissues: No acute or suspicious osseous or soft tissue lesion is identified. Bilateral L5 pars defects with minimal anterolisthesis and degenerative changes at L5-S1.     1.No evidence of acute pulmonary embolism. 2.Mosaic pattern of groundglass attenuation noted within both lungs. This could be related to small vessel/small airway disease, mild edema, or atypical infiltrates. 3.No acute abnormality is identified within the abdomen or pelvis. 4.Colonic diverticulosis. 5.Left nonobstructive nephrolithiasis. 6.Additional findings as detailed above. Electronically Signed: Abel Kebede MD  5/21/2025 9:12 AM EDT  Workstation ID: [id number]    XR Chest 1 View  Result Date: 5/21/2025  XR CHEST 1 VW-  Date of exam: 5/21/2025 4:15 AM.  Comparison: 2/11/2024.  INDICATIONS: 64-year-old male with dizziness.  FINDINGS: A single AP (or PA) upright portable chest radiograph was performed. No cardiac enlargement is seen. No acute infiltrate is appreciated. There is pulmonary hypoinflation with mild bilateral subsegmental atelectasis. There is slight asymmetric elevation of the right diaphragm. The thoracic aorta is atherosclerotic. Degenerative changes involve the shoulders. No pleural effusion or pneumothorax is identified. No significant interval change is seen since the prior study (or studies).       No acute infiltrate is appreciated.    Portions of this note were completed with a voice recognition program.  5/21/2025 4:39 AM by Nile Richard MD on Workstation: [id number]        MDM:    Procedures    Labs were collected in the emergency department and all labs were reviewed and interpreted by me.  X-ray were performed in the emergency  department and all X-ray impressions were independently interpreted by me.  An EKG was performed and the EKG was interpreted by me.  CT scan was performed in the emergency department and the CT scan radiology impression was interpreted by me.                     Adarsh Parks MD  15:48 EDT  05/21/25         Adarsh Parks MD  05/21/25 1142       Adarsh Parks MD  05/21/25 1548

## 2025-05-30 ENCOUNTER — TRANSCRIBE ORDERS (OUTPATIENT)
Dept: ADMINISTRATIVE | Facility: HOSPITAL | Age: 65
End: 2025-05-30
Payer: MEDICARE

## 2025-05-30 DIAGNOSIS — R06.00 DYSPNEA, UNSPECIFIED TYPE: Primary | ICD-10-CM

## 2025-06-11 ENCOUNTER — HOSPITAL ENCOUNTER (OUTPATIENT)
Dept: CARDIOLOGY | Facility: HOSPITAL | Age: 65
Discharge: HOME | End: 2025-06-11
Admitting: INTERNAL MEDICINE
Payer: MEDICARE

## 2025-06-11 DIAGNOSIS — R06.00 DYSPNEA, UNSPECIFIED TYPE: ICD-10-CM

## 2025-06-11 LAB
ASCENDING AORTA: 4.4 CM
BH CV ECHO MEAS - AO ROOT AREA (BSA CORRECTED): 1.7 CM2
BH CV ECHO MEAS - AO ROOT DIAM: 4 CM
BH CV ECHO MEAS - EDV(CUBED): 140.6 ML
BH CV ECHO MEAS - EDV(MOD-SP2): 207 ML
BH CV ECHO MEAS - EDV(MOD-SP4): 205 ML
BH CV ECHO MEAS - EF(MOD-SP2): 55.5 %
BH CV ECHO MEAS - EF(MOD-SP4): 56 %
BH CV ECHO MEAS - ESV(CUBED): 64 ML
BH CV ECHO MEAS - ESV(MOD-SP2): 92.1 ML
BH CV ECHO MEAS - ESV(MOD-SP4): 90.3 ML
BH CV ECHO MEAS - FS: 23.1 %
BH CV ECHO MEAS - IVS/LVPW: 1 CM
BH CV ECHO MEAS - IVSD: 1.3 CM
BH CV ECHO MEAS - LA DIMENSION: 4.5 CM
BH CV ECHO MEAS - LV DIASTOLIC VOL/BSA (35-75): 87.7 CM2
BH CV ECHO MEAS - LV MASS(C)D: 278.4 GRAMS
BH CV ECHO MEAS - LV MAX PG: 3 MMHG
BH CV ECHO MEAS - LV MEAN PG: 2 MMHG
BH CV ECHO MEAS - LV SYSTOLIC VOL/BSA (12-30): 38.6 CM2
BH CV ECHO MEAS - LV V1 MAX: 86.5 CM/SEC
BH CV ECHO MEAS - LV V1 VTI: 19.5 CM
BH CV ECHO MEAS - LVIDD: 5.2 CM
BH CV ECHO MEAS - LVIDS: 4 CM
BH CV ECHO MEAS - LVPWD: 1.3 CM
BH CV ECHO MEAS - RAP SYSTOLE: 3 MMHG
BH CV ECHO MEAS - RVSP: 28 MMHG
BH CV ECHO MEAS - SV(MOD-SP2): 114.9 ML
BH CV ECHO MEAS - SV(MOD-SP4): 114.7 ML
BH CV ECHO MEAS - SVI(MOD-SP2): 49.1 ML/M2
BH CV ECHO MEAS - SVI(MOD-SP4): 49.1 ML/M2
BH CV ECHO MEAS - TR MAX PG: 24.8 MMHG
BH CV ECHO MEAS - TR MAX VEL: 249 CM/SEC
BH CV STRESS BP STAGE 1: NORMAL
BH CV STRESS BP STAGE 2: NORMAL
BH CV STRESS BP STAGE 3: NORMAL
BH CV STRESS DURATION MIN STAGE 1: 3
BH CV STRESS DURATION MIN STAGE 2: 3
BH CV STRESS DURATION MIN STAGE 3: 1
BH CV STRESS DURATION SEC STAGE 1: 0
BH CV STRESS DURATION SEC STAGE 2: 0
BH CV STRESS DURATION SEC STAGE 3: 4
BH CV STRESS GRADE STAGE 1: 10
BH CV STRESS GRADE STAGE 2: 12
BH CV STRESS GRADE STAGE 3: 14
BH CV STRESS HR STAGE 1: 108
BH CV STRESS HR STAGE 2: 136
BH CV STRESS HR STAGE 3: 150
BH CV STRESS METS STAGE 1: 5
BH CV STRESS METS STAGE 2: 7.5
BH CV STRESS METS STAGE 3: 10
BH CV STRESS O2 STAGE 1: 95
BH CV STRESS O2 STAGE 2: 95
BH CV STRESS O2 STAGE 3: 96
BH CV STRESS PROTOCOL 1: NORMAL
BH CV STRESS RECOVERY BP: NORMAL MMHG
BH CV STRESS RECOVERY HR: 90 BPM
BH CV STRESS RECOVERY O2: 96 %
BH CV STRESS SPEED STAGE 1: 1.7
BH CV STRESS SPEED STAGE 2: 2.5
BH CV STRESS SPEED STAGE 3: 3.4
BH CV STRESS STAGE 1: 1
BH CV STRESS STAGE 2: 2
BH CV STRESS STAGE 3: 3
BH CV XLRA - RV MID: 2.1 CM
LV EF BIPLANE MOD: 56.3 %
MAXIMAL PREDICTED HEART RATE: 155 BPM
PERCENT MAX PREDICTED HR: 96.77 %
STRESS BASELINE BP: NORMAL MMHG
STRESS BASELINE HR: 67 BPM
STRESS O2 SAT REST: 94 %
STRESS PERCENT HR: 114 %
STRESS POST ESTIMATED WORKLOAD: 8.6 METS
STRESS POST EXERCISE DUR MIN: 7 MIN
STRESS POST EXERCISE DUR SEC: 4 SEC
STRESS POST O2 SAT PEAK: 96 %
STRESS POST PEAK BP: NORMAL MMHG
STRESS POST PEAK HR: 150 BPM
STRESS TARGET HR: 132 BPM

## 2025-06-11 PROCEDURE — 93350 STRESS TTE ONLY: CPT

## 2025-06-11 PROCEDURE — 25010000002 SULFUR HEXAFLUORIDE MICROSPH 60.7-25 MG RECONSTITUTED SUSPENSION: Performed by: INTERNAL MEDICINE

## 2025-06-11 PROCEDURE — 93325 DOPPLER ECHO COLOR FLOW MAPG: CPT

## 2025-06-11 PROCEDURE — 93320 DOPPLER ECHO COMPLETE: CPT

## 2025-06-11 PROCEDURE — 93017 CV STRESS TEST TRACING ONLY: CPT

## 2025-06-11 RX ADMIN — SULFUR HEXAFLUORIDE 5 ML: KIT at 08:00

## 2025-06-12 LAB
QT INTERVAL: 397 MS
QTC INTERVAL: 459 MS

## 2025-06-24 ENCOUNTER — TRANSCRIBE ORDERS (OUTPATIENT)
Dept: ADMINISTRATIVE | Facility: HOSPITAL | Age: 65
End: 2025-06-24
Payer: MEDICARE

## 2025-06-24 DIAGNOSIS — R94.39 ABNORMAL CARDIOVASCULAR STRESS TEST: Primary | ICD-10-CM

## 2025-08-11 ENCOUNTER — TELEPHONE (OUTPATIENT)
Dept: INFUSION THERAPY | Facility: HOSPITAL | Age: 65
End: 2025-08-11
Payer: MEDICARE

## 2025-08-13 ENCOUNTER — HOSPITAL ENCOUNTER (OUTPATIENT)
Dept: CT IMAGING | Facility: HOSPITAL | Age: 65
Discharge: HOME | End: 2025-08-13
Payer: MEDICARE

## 2025-08-13 VITALS
RESPIRATION RATE: 18 BRPM | TEMPERATURE: 97.9 F | WEIGHT: 246.6 LBS | HEART RATE: 63 BPM | OXYGEN SATURATION: 93 % | BODY MASS INDEX: 34.52 KG/M2 | SYSTOLIC BLOOD PRESSURE: 107 MMHG | HEIGHT: 71 IN | DIASTOLIC BLOOD PRESSURE: 74 MMHG

## 2025-08-13 DIAGNOSIS — R94.39 ABNORMAL CARDIOVASCULAR STRESS TEST: ICD-10-CM

## 2025-08-13 PROCEDURE — 75574 CT ANGIO HRT W/3D IMAGE: CPT

## 2025-08-13 PROCEDURE — 25510000001 IOPAMIDOL PER 1 ML: Performed by: INTERNAL MEDICINE

## 2025-08-13 RX ORDER — METOPROLOL TARTRATE 100 MG/1
100 TABLET ORAL ONCE
Status: COMPLETED | OUTPATIENT
Start: 2025-08-13 | End: 2025-08-13

## 2025-08-13 RX ORDER — IVABRADINE 5 MG/1
15 TABLET, FILM COATED ORAL ONCE
Status: DISCONTINUED | OUTPATIENT
Start: 2025-08-13 | End: 2025-08-14 | Stop reason: HOSPADM

## 2025-08-13 RX ORDER — IOPAMIDOL 755 MG/ML
100 INJECTION, SOLUTION INTRAVASCULAR
Status: COMPLETED | OUTPATIENT
Start: 2025-08-13 | End: 2025-08-13

## 2025-08-13 RX ORDER — METOPROLOL TARTRATE 1 MG/ML
5 INJECTION, SOLUTION INTRAVENOUS
Status: DISCONTINUED | OUTPATIENT
Start: 2025-08-13 | End: 2025-08-14 | Stop reason: HOSPADM

## 2025-08-13 RX ORDER — METOPROLOL TARTRATE 50 MG
50 TABLET ORAL
Status: DISCONTINUED | OUTPATIENT
Start: 2025-08-13 | End: 2025-08-14 | Stop reason: HOSPADM

## 2025-08-13 RX ORDER — METOPROLOL TARTRATE 50 MG
50 TABLET ORAL ONCE
Status: COMPLETED | OUTPATIENT
Start: 2025-08-13 | End: 2025-08-13

## 2025-08-13 RX ORDER — METOPROLOL TARTRATE 100 MG/1
200 TABLET ORAL ONCE
Status: COMPLETED | OUTPATIENT
Start: 2025-08-13 | End: 2025-08-13

## 2025-08-13 RX ORDER — NITROGLYCERIN 0.4 MG/1
0.4 TABLET SUBLINGUAL
Status: COMPLETED | OUTPATIENT
Start: 2025-08-13 | End: 2025-08-13

## 2025-08-13 RX ORDER — NITROGLYCERIN 0.4 MG/1
0.8 TABLET SUBLINGUAL
Status: COMPLETED | OUTPATIENT
Start: 2025-08-13 | End: 2025-08-13

## 2025-08-13 RX ADMIN — NITROGLYCERIN 0.8 MG: 0.4 TABLET SUBLINGUAL at 09:54

## 2025-08-13 RX ADMIN — METOPROLOL 150 MG: 100 TABLET ORAL at 08:17

## 2025-08-13 RX ADMIN — METOPROLOL TARTRATE 50 MG: 50 TABLET, FILM COATED ORAL at 08:57

## 2025-08-13 RX ADMIN — IOPAMIDOL 70 ML: 755 INJECTION, SOLUTION INTRAVENOUS at 10:11

## 2025-08-13 RX ADMIN — METOPROLOL TARTRATE 50 MG: 50 TABLET, FILM COATED ORAL at 09:29
